# Patient Record
Sex: MALE | Race: WHITE | NOT HISPANIC OR LATINO | Employment: FULL TIME | URBAN - METROPOLITAN AREA
[De-identification: names, ages, dates, MRNs, and addresses within clinical notes are randomized per-mention and may not be internally consistent; named-entity substitution may affect disease eponyms.]

---

## 2017-02-06 PROBLEM — K50.90 CROHN DISEASE: Status: ACTIVE | Noted: 2017-02-06

## 2017-02-07 ENCOUNTER — HOSPITAL ENCOUNTER (INPATIENT)
Facility: HOSPITAL | Age: 42
LOS: 2 days | Discharge: HOME | DRG: 386 | End: 2017-02-09
Attending: COLON & RECTAL SURGERY | Admitting: COLON & RECTAL SURGERY
Payer: COMMERCIAL

## 2017-02-07 DIAGNOSIS — K50.919 CROHN'S DISEASE WITH COMPLICATION, UNSPECIFIED GASTROINTESTINAL TRACT LOCATION: ICD-10-CM

## 2017-02-07 DIAGNOSIS — K50.90 CROHN DISEASE: Primary | ICD-10-CM

## 2017-02-07 LAB
ALBUMIN SERPL BCP-MCNC: 2.6 G/DL
ALP SERPL-CCNC: 67 U/L
ALT SERPL W/O P-5'-P-CCNC: 5 U/L
ANION GAP SERPL CALC-SCNC: 6 MMOL/L
AST SERPL-CCNC: 6 U/L
BASOPHILS # BLD AUTO: 0.02 K/UL
BASOPHILS NFR BLD: 0.2 %
BILIRUB SERPL-MCNC: 0.8 MG/DL
BUN SERPL-MCNC: 8 MG/DL
CALCIUM SERPL-MCNC: 8.3 MG/DL
CHLORIDE SERPL-SCNC: 107 MMOL/L
CO2 SERPL-SCNC: 24 MMOL/L
CREAT SERPL-MCNC: 0.7 MG/DL
CRP SERPL-MCNC: 36.8 MG/L
DIFFERENTIAL METHOD: ABNORMAL
EOSINOPHIL # BLD AUTO: 0.1 K/UL
EOSINOPHIL NFR BLD: 0.8 %
ERYTHROCYTE [DISTWIDTH] IN BLOOD BY AUTOMATED COUNT: 15.5 %
EST. GFR  (AFRICAN AMERICAN): >60 ML/MIN/1.73 M^2
EST. GFR  (NON AFRICAN AMERICAN): >60 ML/MIN/1.73 M^2
GLUCOSE SERPL-MCNC: 82 MG/DL
HCT VFR BLD AUTO: 29.6 %
HGB BLD-MCNC: 9.3 G/DL
INR PPP: 0.9
LYMPHOCYTES # BLD AUTO: 1.4 K/UL
LYMPHOCYTES NFR BLD: 13.8 %
MAGNESIUM SERPL-MCNC: 1.6 MG/DL
MCH RBC QN AUTO: 24.3 PG
MCHC RBC AUTO-ENTMCNC: 31.4 %
MCV RBC AUTO: 77 FL
MONOCYTES # BLD AUTO: 0.9 K/UL
MONOCYTES NFR BLD: 9.1 %
NEUTROPHILS # BLD AUTO: 7.7 K/UL
NEUTROPHILS NFR BLD: 75.9 %
PHOSPHATE SERPL-MCNC: 3.3 MG/DL
PLATELET # BLD AUTO: 320 K/UL
PMV BLD AUTO: 8.7 FL
POTASSIUM SERPL-SCNC: 4.1 MMOL/L
PREALB SERPL-MCNC: 15 MG/DL
PROT SERPL-MCNC: 6.4 G/DL
PROTHROMBIN TIME: 10.1 SEC
RBC # BLD AUTO: 3.83 M/UL
SODIUM SERPL-SCNC: 137 MMOL/L
VANCOMYCIN SERPL-MCNC: <1.1 UG/ML
WBC # BLD AUTO: 10.09 K/UL

## 2017-02-07 PROCEDURE — 20600001 HC STEP DOWN PRIVATE ROOM

## 2017-02-07 PROCEDURE — 84134 ASSAY OF PREALBUMIN: CPT

## 2017-02-07 PROCEDURE — 86140 C-REACTIVE PROTEIN: CPT

## 2017-02-07 PROCEDURE — 25000003 PHARM REV CODE 250: Performed by: SURGERY

## 2017-02-07 PROCEDURE — 99254 IP/OBS CNSLTJ NEW/EST MOD 60: CPT | Mod: ,,, | Performed by: INTERNAL MEDICINE

## 2017-02-07 PROCEDURE — 85025 COMPLETE CBC W/AUTO DIFF WBC: CPT

## 2017-02-07 PROCEDURE — 63600175 PHARM REV CODE 636 W HCPCS: Performed by: SURGERY

## 2017-02-07 PROCEDURE — 36415 COLL VENOUS BLD VENIPUNCTURE: CPT

## 2017-02-07 PROCEDURE — 25000003 PHARM REV CODE 250: Performed by: ORTHOPAEDIC SURGERY

## 2017-02-07 PROCEDURE — 84100 ASSAY OF PHOSPHORUS: CPT

## 2017-02-07 PROCEDURE — 83735 ASSAY OF MAGNESIUM: CPT

## 2017-02-07 PROCEDURE — 80053 COMPREHEN METABOLIC PANEL: CPT

## 2017-02-07 PROCEDURE — 63600175 PHARM REV CODE 636 W HCPCS: Performed by: ORTHOPAEDIC SURGERY

## 2017-02-07 PROCEDURE — 85610 PROTHROMBIN TIME: CPT

## 2017-02-07 PROCEDURE — 80202 ASSAY OF VANCOMYCIN: CPT

## 2017-02-07 RX ORDER — SODIUM CHLORIDE AND POTASSIUM CHLORIDE 150; 900 MG/100ML; MG/100ML
INJECTION, SOLUTION INTRAVENOUS CONTINUOUS
Status: DISCONTINUED | OUTPATIENT
Start: 2017-02-07 | End: 2017-02-07

## 2017-02-07 RX ORDER — ONDANSETRON 8 MG/1
8 TABLET, ORALLY DISINTEGRATING ORAL EVERY 8 HOURS PRN
Status: DISCONTINUED | OUTPATIENT
Start: 2017-02-07 | End: 2017-02-07 | Stop reason: SDUPTHER

## 2017-02-07 RX ORDER — METRONIDAZOLE 500 MG/100ML
500 INJECTION, SOLUTION INTRAVENOUS
Status: DISCONTINUED | OUTPATIENT
Start: 2017-02-07 | End: 2017-02-07

## 2017-02-07 RX ORDER — RAMELTEON 8 MG/1
8 TABLET ORAL NIGHTLY PRN
Status: DISCONTINUED | OUTPATIENT
Start: 2017-02-07 | End: 2017-02-09 | Stop reason: HOSPADM

## 2017-02-07 RX ORDER — SODIUM CHLORIDE 0.9 % (FLUSH) 0.9 %
3 SYRINGE (ML) INJECTION EVERY 8 HOURS
Status: DISCONTINUED | OUTPATIENT
Start: 2017-02-07 | End: 2017-02-09 | Stop reason: HOSPADM

## 2017-02-07 RX ORDER — ACETAMINOPHEN 325 MG/1
650 TABLET ORAL EVERY 8 HOURS PRN
Status: DISCONTINUED | OUTPATIENT
Start: 2017-02-07 | End: 2017-02-09 | Stop reason: HOSPADM

## 2017-02-07 RX ORDER — NALOXONE HCL 0.4 MG/ML
0.02 VIAL (ML) INJECTION
Status: DISCONTINUED | OUTPATIENT
Start: 2017-02-07 | End: 2017-02-09 | Stop reason: HOSPADM

## 2017-02-07 RX ORDER — SODIUM CHLORIDE 9 MG/ML
INJECTION, SOLUTION INTRAVENOUS CONTINUOUS
Status: DISCONTINUED | OUTPATIENT
Start: 2017-02-07 | End: 2017-02-09 | Stop reason: HOSPADM

## 2017-02-07 RX ORDER — MORPHINE SULFATE 4 MG/ML
4 INJECTION, SOLUTION INTRAMUSCULAR; INTRAVENOUS EVERY 4 HOURS PRN
Status: DISCONTINUED | OUTPATIENT
Start: 2017-02-07 | End: 2017-02-09 | Stop reason: HOSPADM

## 2017-02-07 RX ORDER — ONDANSETRON 2 MG/ML
4 INJECTION INTRAMUSCULAR; INTRAVENOUS EVERY 12 HOURS PRN
Status: DISCONTINUED | OUTPATIENT
Start: 2017-02-07 | End: 2017-02-09 | Stop reason: HOSPADM

## 2017-02-07 RX ORDER — METRONIDAZOLE 500 MG/100ML
500 INJECTION, SOLUTION INTRAVENOUS
Status: DISCONTINUED | OUTPATIENT
Start: 2017-02-07 | End: 2017-02-09

## 2017-02-07 RX ORDER — SODIUM CHLORIDE 0.9 % (FLUSH) 0.9 %
3 SYRINGE (ML) INJECTION EVERY 8 HOURS
Status: DISCONTINUED | OUTPATIENT
Start: 2017-02-07 | End: 2017-02-07

## 2017-02-07 RX ORDER — OXYCODONE HYDROCHLORIDE 5 MG/1
5 TABLET ORAL EVERY 4 HOURS PRN
Status: DISCONTINUED | OUTPATIENT
Start: 2017-02-07 | End: 2017-02-09 | Stop reason: HOSPADM

## 2017-02-07 RX ORDER — DIPHENHYDRAMINE HYDROCHLORIDE 50 MG/ML
25 INJECTION INTRAMUSCULAR; INTRAVENOUS EVERY 4 HOURS PRN
Status: DISCONTINUED | OUTPATIENT
Start: 2017-02-07 | End: 2017-02-09 | Stop reason: HOSPADM

## 2017-02-07 RX ORDER — CIPROFLOXACIN 2 MG/ML
400 INJECTION, SOLUTION INTRAVENOUS
Status: DISCONTINUED | OUTPATIENT
Start: 2017-02-07 | End: 2017-02-09

## 2017-02-07 RX ADMIN — OXYCODONE HYDROCHLORIDE 5 MG: 5 TABLET ORAL at 06:02

## 2017-02-07 RX ADMIN — SODIUM CHLORIDE: 0.9 INJECTION, SOLUTION INTRAVENOUS at 05:02

## 2017-02-07 RX ADMIN — Medication 3 ML: at 02:02

## 2017-02-07 RX ADMIN — METRONIDAZOLE 500 MG: 500 INJECTION, SOLUTION INTRAVENOUS at 07:02

## 2017-02-07 RX ADMIN — PIPERACILLIN AND TAZOBACTAM 3.38 G: 3; .375 INJECTION, POWDER, LYOPHILIZED, FOR SOLUTION INTRAVENOUS; PARENTERAL at 05:02

## 2017-02-07 RX ADMIN — OXYCODONE HYDROCHLORIDE 5 MG: 5 TABLET ORAL at 03:02

## 2017-02-07 RX ADMIN — PIPERACILLIN AND TAZOBACTAM 3.38 G: 3; .375 INJECTION, POWDER, LYOPHILIZED, FOR SOLUTION INTRAVENOUS; PARENTERAL at 10:02

## 2017-02-07 RX ADMIN — CIPROFLOXACIN 400 MG: 2 INJECTION, SOLUTION INTRAVENOUS at 06:02

## 2017-02-07 RX ADMIN — METRONIDAZOLE 500 MG: 500 INJECTION, SOLUTION INTRAVENOUS at 05:02

## 2017-02-07 RX ADMIN — OXYCODONE HYDROCHLORIDE 5 MG: 5 TABLET ORAL at 11:02

## 2017-02-07 RX ADMIN — VANCOMYCIN HYDROCHLORIDE 1000 MG: 1 INJECTION, POWDER, LYOPHILIZED, FOR SOLUTION INTRAVENOUS at 05:02

## 2017-02-07 RX ADMIN — Medication 3 ML: at 09:02

## 2017-02-07 RX ADMIN — SODIUM CHLORIDE: 0.9 INJECTION, SOLUTION INTRAVENOUS at 11:02

## 2017-02-07 RX ADMIN — Medication 3 ML: at 05:02

## 2017-02-07 RX ADMIN — OXYCODONE HYDROCHLORIDE 5 MG: 5 TABLET ORAL at 10:02

## 2017-02-07 RX ADMIN — OXYCODONE HYDROCHLORIDE 5 MG: 5 TABLET ORAL at 07:02

## 2017-02-07 NOTE — H&P
[facility]  History & Physical  General Surgery      SUBJECTIVE:     Chief Complaint/Reason for Admission: abdominal pain    History of Present Illness:  Patient is a 41 y.o. male with a h/o Crohn's disease s/p bowel resection. He is a transfer from [facility] where he was admitted after presenting with two days of right-sided abdominal pain. He initially thought that the pain may have been a pulled muscle or a hernia. He did not have any nausea, vomiting, constipation, diarrhea, fevers, or chills. No changes in stool color, volume, or consistency. He has not noticed any blood in his stool. 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 There was also a small abscess within the right anterior pelvic wall. Received zosyn and flagyl at OSH. On arrival he is AFVSS. Pain is well-controlled.     No prescriptions prior to admission.     Review of patient's allergies indicates:  Allergies not on file    No past medical history on file.  No past surgical history on file.  No family history on file.  Social History   Substance Use Topics    Smoking status: Not on file    Smokeless tobacco: Not on file    Alcohol use Not on file      Review of Systems:  Constitutional: no fever or chills  ENT: no nasal congestion or sore throat  Cardiovascular: no chest pain or palpitations  Gastrointestinal: positive for abdominal pain  Genitourinary: no hematuria or dysuria  Musculoskeletal: no arthralgias or myalgias  Neurological: no seizures or tremors  Behavioral/Psych: no auditory or visual hallucinations    OBJECTIVE:     Vital Signs (Most Recent):  Temp: 98.2 °F (36.8 °C) (02/07/17 0243)  Pulse: 81 (02/07/17 0243)  Resp: 16 (02/07/17 0243)  BP: 116/64 (02/07/17 0243)    Physical  Exam:  General: NAD  Neuro: AAOx3  HEENT: normocephalic, atraumatic  Cardio: S1 and S2, RRR  Resp: Moving air appropriately, breathing even and unlabored  Abd: Soft, right-sided TTP, ND; bowel sounds in all quadrants  Ext: Warm and well perfused    Laboratory:  CBC: No results for input(s): WBC, RBC, HGB, HCT, PLT, MCV, MCH, MCHC in the last 168 hours.    CMP: No results for input(s): GLU, CALCIUM, ALBUMIN, PROT, NA, K, CO2, CL, BUN, CREATININE, ALKPHOS, ALT, AST, BILITOT in the last 168 hours.    Coagulation: No results for input(s): INR, APTT in the last 168 hours.    Invalid input(s): PT    ASSESSMENT/PLAN:   Patient is a 41 y.o. male with a h/o Crohn's disease s/p bowel resection with abdominal wall and pelvic wall abscesses on CT.    AFVSS  CBC, CMP, Mag, Phos, PT/INR pending  NPO, MIVF @ 125 cc/hr  Abx: Vanc, Zosyn, Flagyl  Zofran/phenergan for nausea PRN  Pain control: oxycodone PO with morphine IV for breakthrough      Update    Patient seen and examined agree with above.  Currently in no acute distress, pain controlled, wants to eat.  Patient reports intermittent treatment of Crohns flare over the past 20 years.  States one abdominal procedure (ileocolic resection?) in his 20's.  Reports pain began over the weekend and was seen at [facility] and found to have sub q abscesses.  He was started on Abx and transferred to [facility] for further management.  Patient does not see a [facility] GI physician.  Last colnoscopy was many years ago.    PE: afebrile vitals normal, thin male in NAD  ABD: non distended, soft, tender in right flank to deep palpation, no rebound or guarding.      Labs: WBC - 10.09  CRP 36  Pre albumin 15    CT scan: OSH scan shows right flank subcutaneous abscess and thickened TI    Plan:   Admit to CRS  IV Abx to continue  Consult GI for Crohn's assessment and treatment  Plan for IR drainage of abscesses   No acute surgical indication at this time      Jamaal Plasencia MD  Colon and  Rectal Surgery Fellow  [phone]

## 2017-02-07 NOTE — IP AVS SNAPSHOT
[facility]  [address]  [facility] [address]  Phone: [phone]           Patient Discharge Instructions     Our goal is to set you up for success. This packet includes information on your condition, medications, and your home care. It will help you to care for yourself so you don't get sicker and need to go back to the hospital.     Please ask your nurse if you have any questions.        There are many details to remember when preparing to leave the hospital. Here is what you will need to do:    1. Take your medicine. If you are prescribed medications, review your Medication List in the following pages. You may have new medications to  at the pharmacy and others that you'll need to stop taking. Review the instructions for how and when to take your medications. Talk with your doctor or nurses if you are unsure of what to do.     2. Go to your follow-up appointments. Specific follow-up information is listed in the following pages. Your may be contacted by a transition nurse or clinical provider about future appointments. Be sure we have all of the phone numbers to reach you, if needed. Please contact your provider's office if you are unable to make an appointment.     3. Watch for warning signs. Your doctor or nurse will give you detailed warning signs to watch for and when to call for assistance. These instructions may also include educational information about your condition. If you experience any of warning signs to your health, call your doctor.               [facility]  Unless otherwise directed by your provider, please contact [facility], our nurse care line that is available for 24/7 assistance.     [phone] (toll-free)    Registered nurses in the [facility] provide clinical advisement, health education, appointment booking, and other advisory services.                    ** Verify the list of medication(s) below is accurate and up  to date. Carry this with you in case of emergency. If your medications have changed, please notify your healthcare provider.             Medication List      START taking these medications        Additional Info                      ciprofloxacin HCl 500 MG tablet   Commonly known as:  CIPRO   Quantity:  14 tablet   Refills:  0   Dose:  500 mg   Indications:  Intra-abdominal    Instructions:  Take 1 tablet (500 mg total) by mouth every 12 (twelve) hours.     Begin Date    AM    Noon    PM    Bedtime       metronidazole 500 MG tablet   Commonly known as:  FLAGYL   Quantity:  21 tablet   Refills:  0   Dose:  500 mg   Indications:  Intra-abdominal    Instructions:  Take 1 tablet (500 mg total) by mouth every 8 (eight) hours.     Begin Date    AM    Noon    PM    Bedtime       oxycodone 5 MG immediate release tablet   Commonly known as:  ROXICODONE   Quantity:  50 tablet   Refills:  0   Dose:  5 mg    Last time this was given:  5 mg on 2/9/2017  7:19 AM   Instructions:  Take 1 tablet (5 mg total) by mouth every 4 (four) hours as needed.     Begin Date    AM    Noon    PM    Bedtime            Where to Get Your Medications      You can get these medications from any pharmacy     Bring a paper prescription for each of these medications     ciprofloxacin HCl 500 MG tablet    metronidazole 500 MG tablet    oxycodone 5 MG immediate release tablet                  Please bring to all follow up appointments:    1. A copy of your discharge instructions.  2. All medicines you are currently taking in their original bottles.  3. Identification and insurance card.    Please arrive 15 minutes ahead of scheduled appointment time.    Please call 24 hours in advance if you must reschedule your appointment and/or time.        Your Scheduled Appointments     Feb 15, 2017  2:00 PM CST   Ct Abd Pel W Contrast with [facility] CT6 MOBILE LIMIT 450 LBS   [facility]    [address]  [facility]  "[id number] [phone]            Mar 01, 2017  3:45 PM CST   Post OP with OKSANA Upton MD   [facility] ([facility] )    [address]  [facility] [address] [phone]              Follow-up Information     Follow up with JONNA Upton MD In 1 week.    Specialty:  Colon and Rectal Surgery    Why:  for drain removal, to have CT scan done prior to appt    Contact information:    [address]  [facility] [id number] [phone]          Discharge Instructions     Future Orders    Call MD for:  difficulty breathing or increased cough     Call MD for:  persistent nausea and vomiting or diarrhea     Call MD for:  redness, tenderness, or signs of infection (pain, swelling, redness, odor or green/yellow discharge around incision site)     Call MD for:  severe uncontrolled pain     Call MD for:  temperature >100.4     Call MD for:     Scheduling Instructions:    Record output from drain daily, keep that information and bring to appt in one week with Dr. Upton  Follow up with selected GI doctor for management of Crohn's    Diet general     Questions:    Total calories:      Fat restriction, if any:      Protein restriction, if any:      Na restriction, if any:      Fluid restriction:      Additional restrictions:          Primary Diagnosis     Your primary diagnosis was:  Inflammatory Bowel Disease (Crohn's Disease)      Admission Information     Date & Time Provider Department CSN    2/7/2017  2:40 AM OKSANA Upton MD [facility] [id number]      Care Providers     Provider Role Specialty Primary office phone    OKSANA Upton MD Attending Provider Colon and Rectal Surgery [phone]      Your Vitals Were     BP Pulse Temp Resp Height Weight    115/69 (BP Location: Right arm, Patient Position: Lying, BP Method: Automatic) 79 97.9 °F (36.6 °C) (Oral) 16 6' 3" (1.905 m) 61.2 kg (135 lb)    SpO2 BMI             99% 16.87 kg/m2         Recent Lab Values  "    No lab values to display.      Pending Labs     Order Current Status    Aerobic culture Preliminary result    Culture, Anaerobe Preliminary result      Allergies as of 2/9/2017     No Known Allergies      Advance Directives     An advance directive is a document which, in the event you are no longer able to make decisions for yourself, tells your healthcare team what kind of treatment you do or do not want to receive, or who you would like to make those decisions for you.  If you do not currently have an advance directive, [facility] encourages you to create one.  For more information call:  [phone]), [phone] ([phone]),  or log on to [email or web address].        Smoking Cessation     If you would like to quit smoking:   You may be eligible for free services if you are a [place] resident and started smoking cigarettes before September 1, 1988.  Call the Smoking Cessation Trust ([facility]) toll free at ([phone] or [phone]) [phone].   Call 0-209-QUIT-NOW if you do not meet the above criteria.            Language Assistance Services     ATTENTION: Language assistance services are available, free of charge. Please call [phone].      ATENCIÓN: Si habla español, tiene a regan disposición servicios gratuitos de asistencia lingüística. Llame al [phone].     CHÚ Ý: N?u b?n nói Ti?ng Vi?t, có các d?ch v? h? tr? ngôn ng? mi?n phí dành cho b?n. G?i s? [phone].        [facility] Sign-Up     Activating your [facility] account is as easy as 1-2-3!     1) Visit [email or web address], select Sign Up Now, enter this activation code and your date of birth, then select Next.  [email or web address]  Expires: 3/26/2017  7:53 AM      2) Create a username and password to use when you visit [facility] in the future and select a security question in case you lose your password and select Next.    3) Enter your e-mail address and click Sign Up!    Additional Information  If you have questions, please  e-mail [email or web address] or call [phone] to talk to our [facility] staff. Remember, [facility] is NOT to be used for urgent needs. For medical emergencies, dial 911.          [facility] complies with applicable Federal civil rights laws and does not discriminate on the basis of race, color, national origin, age, disability, or sex.

## 2017-02-07 NOTE — CONSULTS
IR Consult Note:    Reviewed chart. No imaging available to us at this time.    Recommendations:  1) Bring CD with imaging from outside hospital to IR department.  2) Decisions will be made based on imaging review.  3) Keep patient NPO.    Jean-Claude Garcia MD  Resident  Department of Radiology  Pager: [phone]

## 2017-02-07 NOTE — PLAN OF CARE
met with pt and obtained assessment information.  Pt receiving IV Antibiotics.  States relief of pain after taking pain medication.  Pt travels due to work, his wife resides in [place].  CM will continue to follow for discharge planning.     No PCP    [facility]armacy [facility] [place] [place]    Payor - Payor: [facility] / Plan: BCBS ALL OUT OF STATE / Product Type: PPO /           02/07/17 0755   Discharge Assessment   Assessment Type Discharge Planning Assessment   Assessment information obtained from? Patient;Medical Record   Prior to hospitilization cognitive status: Alert/Oriented   Prior to hospitalization functional status: Independent   Current cognitive status: Alert/Oriented   Current Functional Status: Independent   Arrived From home or self-care   Lives With spouse  (pt's wife resides in [place], he travels because of work and stays in [place] often. )   Able to Return to Prior Arrangements yes   Is patient able to care for self after discharge? Yes   How many people do you have in your home that can help with your care after discharge? 0   Who are your caregiver(s) and their phone number(s)? Wife, Ella Nelson, [phone]   Patient's perception of discharge disposition home or selfcare   Readmission Within The Last 30 Days no previous admission in last 30 days   Patient currently being followed by outpatient case management? No   Patient currently receives home health services? No   Does the patient currently use HME? No   Equipment Currently Used at Home none   Do you have any problems affording any of your prescribed medications? No   Is the patient taking medications as prescribed? yes   Do you have any financial concerns preventing you from receiving the healthcare you need? No   Does the patient have transportation to healthcare appointments? Yes   Transportation Available car   On Dialysis? No   Does the patient receive services at the Coumadin Clinic? No   Discharge  Plan A Home   Discharge Plan B Home Health   Patient/Family In Agreement With Plan yes

## 2017-02-07 NOTE — PROGRESS NOTES
Patient arrived to room 610 via stretcher from [facility]. Vital signs are stable. Awake, alert and oriented x 4. Client is up in bed with call light in reach. Awaiting orders. Will continue to monitor.

## 2017-02-07 NOTE — CONSULTS
"Gastroenterology  Consult note      SUBJECTIVE:     Reason for Consult: Hx of Crohn's    History of Present Illness:  Patient is a 41 y.o. male with a history of Crohn's disease who presented to [facility] as a transfer from [facility] for further management of his IBD.    The patient initially presented to the OSH with a complaint of right sided abdominal pain.  He denied fevers, chills, rigors.  No hematochezia.  No diarrhea.  No nausea/vomiting.      A CT at the OSH showed thickening of the distal ileum and cecum, a fistula from the right colon to the abdominal wall, and 3 loculated fluid collections: right abdominal wall, right pelvic wall, right buttock.     The patient states he was diagnosed with Crohn's disease ~20 years ago when he presented with abdominal pain.  He underwent a colon resection at that time (no records available).  He states he was put on a "Sulfa drug" at the time and stayed on this medication for about 10 years.  Over the last 10 years, he states he has not been on any medication.  He states when he gets abdominal pain he will occasionally go to a walk-in clinic to get steroids.        Prior Endoscopic History  Colonoscopy ~10 years ago, WNL per the patient  EGD: never        No prescriptions prior to admission.       Review of patient's allergies indicates:  No Known Allergies     History reviewed. No pertinent past medical history.  History reviewed. No pertinent past surgical history.  History reviewed. No pertinent family history.  Social History   Substance Use Topics    Smoking status: Current Every Day Smoker     Packs/day: 0.25     Years: 10.00     Types: Cigarettes    Smokeless tobacco: None    Alcohol use No       Review of Systems:  Constitutional: no fever, chills or change in weight   Eyes: no visual changes   ENT: no sore throat or dysphagia  Respiratory: no cough or shortness of breath   Cardiovascular: no chest pain or palpitations   Gastrointestinal: as per " HPI  Hematologic/Lymphatic: no easy bruising or lymphadenopathy   Musculoskeletal: no arthralgias or myalgias   Neurological: no seizures, tremors or change in mental status  Behavioral/Psych: no auditory or visual hallucinations    OBJECTIVE:     Vital Signs (Most Recent)  Temp: 98.5 °F (36.9 °C) (02/07/17 1159)  Pulse: 84 (02/07/17 1159)  Resp: 14 (02/07/17 1159)  BP: (!) 99/55 (02/07/17 1159)  SpO2: 99 % (02/07/17 1159)    Physical Exam:  General appearance: thin, no acute distress  Eyes: anicteric sclerae  Throat: lips, mucosa, and tongue normal, Mallampati II  Neck: supple, symmetrical, trachea midline, no cervical lymphadenopathy  Lungs: breath sounds vesicular in nature, air entry equal bilaterally, no wheeze nor crepitations  Heart: regular rate and rhythm, S1, S2 clearly audible, no murmur, click, rub or gallop  Abdomen: soft, non-tender, non-distended; BS present and normal; no masses,  no organomegaly, no rebound tenderness, rigidity, or guarding  Extremities: no cyanosis or edema, no clubbing  Pulses: 2+ and symmetric  Skin: Skin color, texture, turgor normal.   Neurologic: No focal deficits noted      Laboratory    CBC:   Recent Labs  Lab 02/07/17 0436   WBC 10.09   RBC 3.83*   HGB 9.3*   HCT 29.6*      MCV 77*   MCH 24.3*   MCHC 31.4*     BMP:   Recent Labs  Lab 02/07/17 0436   GLU 82      K 4.1      CO2 24   BUN 8   CREATININE 0.7   CALCIUM 8.3*   MG 1.6     Coagulation:   Recent Labs  Lab 02/07/17 0436   INR 0.9       Imaging:  Reviewed      ASSESSMENT/PLAN:     Crohn's disease with CT evidence of fistulizing disease and abscesses  The patient would be best served now by an IR evaluation for consideration of percutaneous drainage of the fluid collections/abscesses.  He will need longterm antibiotic coverage.  The patient is from [place] and will need to establish care with both a local PCP and a Gastroenterologist.  The patient will ultimately need a TNF-inhibitor (Remicade vs  Humira likely).  In the meantime, we will obtain some preliminary labs.      Recommendations:   IR Consult for drainage of fluid collections  Ciprofloxacin+Flagyl (patient will need Flagyl x 3 months, or until local [place] GI follow up can be arranged)  Check TB Quant Gold, Hepatitis Panel, TPMT levels      Maxime Shelby   Gastroenterology Fellow PGY V  [phone]

## 2017-02-08 LAB
ANION GAP SERPL CALC-SCNC: 5 MMOL/L
BASOPHILS # BLD AUTO: 0.02 K/UL
BASOPHILS NFR BLD: 0.3 %
BUN SERPL-MCNC: 7 MG/DL
CALCIUM SERPL-MCNC: 8.2 MG/DL
CHLORIDE SERPL-SCNC: 108 MMOL/L
CO2 SERPL-SCNC: 26 MMOL/L
CREAT SERPL-MCNC: 0.7 MG/DL
CRP SERPL-MCNC: 44.4 MG/L
DIFFERENTIAL METHOD: ABNORMAL
EOSINOPHIL # BLD AUTO: 0.1 K/UL
EOSINOPHIL NFR BLD: 1.3 %
ERYTHROCYTE [DISTWIDTH] IN BLOOD BY AUTOMATED COUNT: 15.5 %
EST. GFR  (AFRICAN AMERICAN): >60 ML/MIN/1.73 M^2
EST. GFR  (NON AFRICAN AMERICAN): >60 ML/MIN/1.73 M^2
GLUCOSE SERPL-MCNC: 89 MG/DL
GRAM STN SPEC: NORMAL
GRAM STN SPEC: NORMAL
HCT VFR BLD AUTO: 29.2 %
HGB BLD-MCNC: 9.1 G/DL
LYMPHOCYTES # BLD AUTO: 1.5 K/UL
LYMPHOCYTES NFR BLD: 18.2 %
MCH RBC QN AUTO: 24.4 PG
MCHC RBC AUTO-ENTMCNC: 31.2 %
MCV RBC AUTO: 78 FL
MONOCYTES # BLD AUTO: 1 K/UL
MONOCYTES NFR BLD: 12.6 %
NEUTROPHILS # BLD AUTO: 5.4 K/UL
NEUTROPHILS NFR BLD: 67.2 %
PLATELET # BLD AUTO: 325 K/UL
PMV BLD AUTO: 9 FL
POTASSIUM SERPL-SCNC: 4.5 MMOL/L
RBC # BLD AUTO: 3.73 M/UL
SODIUM SERPL-SCNC: 139 MMOL/L
WBC # BLD AUTO: 7.96 K/UL

## 2017-02-08 PROCEDURE — 87205 SMEAR GRAM STAIN: CPT

## 2017-02-08 PROCEDURE — 85025 COMPLETE CBC W/AUTO DIFF WBC: CPT

## 2017-02-08 PROCEDURE — 87186 SC STD MICRODIL/AGAR DIL: CPT | Mod: 59

## 2017-02-08 PROCEDURE — 36415 COLL VENOUS BLD VENIPUNCTURE: CPT

## 2017-02-08 PROCEDURE — 25000003 PHARM REV CODE 250: Performed by: ORTHOPAEDIC SURGERY

## 2017-02-08 PROCEDURE — 87070 CULTURE OTHR SPECIMN AEROBIC: CPT

## 2017-02-08 PROCEDURE — 87075 CULTR BACTERIA EXCEPT BLOOD: CPT

## 2017-02-08 PROCEDURE — 86140 C-REACTIVE PROTEIN: CPT

## 2017-02-08 PROCEDURE — 20600001 HC STEP DOWN PRIVATE ROOM

## 2017-02-08 PROCEDURE — 80048 BASIC METABOLIC PNL TOTAL CA: CPT

## 2017-02-08 PROCEDURE — 63600175 PHARM REV CODE 636 W HCPCS: Performed by: RADIOLOGY

## 2017-02-08 PROCEDURE — 25000003 PHARM REV CODE 250: Performed by: SURGERY

## 2017-02-08 PROCEDURE — 87077 CULTURE AEROBIC IDENTIFY: CPT

## 2017-02-08 PROCEDURE — 25000003 PHARM REV CODE 250: Performed by: NURSE PRACTITIONER

## 2017-02-08 PROCEDURE — 99232 SBSQ HOSP IP/OBS MODERATE 35: CPT | Mod: ,,, | Performed by: COLON & RECTAL SURGERY

## 2017-02-08 PROCEDURE — 63600175 PHARM REV CODE 636 W HCPCS: Performed by: SURGERY

## 2017-02-08 PROCEDURE — 0J9930Z DRAINAGE OF BUTTOCK SUBCUTANEOUS TISSUE AND FASCIA WITH DRAINAGE DEVICE, PERCUTANEOUS APPROACH: ICD-10-PCS | Performed by: RADIOLOGY

## 2017-02-08 PROCEDURE — 63600175 PHARM REV CODE 636 W HCPCS: Performed by: ORTHOPAEDIC SURGERY

## 2017-02-08 RX ORDER — MIDAZOLAM HYDROCHLORIDE 1 MG/ML
INJECTION, SOLUTION INTRAMUSCULAR; INTRAVENOUS CODE/TRAUMA/SEDATION MEDICATION
Status: COMPLETED | OUTPATIENT
Start: 2017-02-08 | End: 2017-02-08

## 2017-02-08 RX ORDER — NICOTINE 7MG/24HR
1 PATCH, TRANSDERMAL 24 HOURS TRANSDERMAL DAILY
Status: DISCONTINUED | OUTPATIENT
Start: 2017-02-08 | End: 2017-02-09 | Stop reason: HOSPADM

## 2017-02-08 RX ORDER — FENTANYL CITRATE 50 UG/ML
INJECTION, SOLUTION INTRAMUSCULAR; INTRAVENOUS CODE/TRAUMA/SEDATION MEDICATION
Status: COMPLETED | OUTPATIENT
Start: 2017-02-08 | End: 2017-02-08

## 2017-02-08 RX ADMIN — OXYCODONE HYDROCHLORIDE 5 MG: 5 TABLET ORAL at 09:02

## 2017-02-08 RX ADMIN — Medication 3 ML: at 10:02

## 2017-02-08 RX ADMIN — FENTANYL CITRATE 25 MCG: 50 INJECTION, SOLUTION INTRAMUSCULAR; INTRAVENOUS at 09:02

## 2017-02-08 RX ADMIN — MIDAZOLAM HYDROCHLORIDE 0.5 MG: 1 INJECTION, SOLUTION INTRAMUSCULAR; INTRAVENOUS at 09:02

## 2017-02-08 RX ADMIN — METRONIDAZOLE 500 MG: 500 INJECTION, SOLUTION INTRAVENOUS at 09:02

## 2017-02-08 RX ADMIN — MIDAZOLAM HYDROCHLORIDE 0.5 MG: 1 INJECTION, SOLUTION INTRAMUSCULAR; INTRAVENOUS at 10:02

## 2017-02-08 RX ADMIN — Medication 3 ML: at 01:02

## 2017-02-08 RX ADMIN — CIPROFLOXACIN 400 MG: 2 INJECTION, SOLUTION INTRAVENOUS at 06:02

## 2017-02-08 RX ADMIN — MORPHINE SULFATE 4 MG: 4 INJECTION INTRAVENOUS at 03:02

## 2017-02-08 RX ADMIN — OXYCODONE HYDROCHLORIDE 5 MG: 5 TABLET ORAL at 03:02

## 2017-02-08 RX ADMIN — OXYCODONE HYDROCHLORIDE 5 MG: 5 TABLET ORAL at 05:02

## 2017-02-08 RX ADMIN — MORPHINE SULFATE 4 MG: 4 INJECTION INTRAVENOUS at 11:02

## 2017-02-08 RX ADMIN — FENTANYL CITRATE 25 MCG: 50 INJECTION, SOLUTION INTRAMUSCULAR; INTRAVENOUS at 10:02

## 2017-02-08 RX ADMIN — METRONIDAZOLE 500 MG: 500 INJECTION, SOLUTION INTRAVENOUS at 03:02

## 2017-02-08 RX ADMIN — CIPROFLOXACIN 400 MG: 2 INJECTION, SOLUTION INTRAVENOUS at 05:02

## 2017-02-08 RX ADMIN — NICOTINE 1 PATCH: 7 PATCH, EXTENDED RELEASE TRANSDERMAL at 03:02

## 2017-02-08 RX ADMIN — OXYCODONE HYDROCHLORIDE 5 MG: 5 TABLET ORAL at 01:02

## 2017-02-08 RX ADMIN — MORPHINE SULFATE 4 MG: 4 INJECTION INTRAVENOUS at 08:02

## 2017-02-08 RX ADMIN — METRONIDAZOLE 500 MG: 500 INJECTION, SOLUTION INTRAVENOUS at 12:02

## 2017-02-08 RX ADMIN — SODIUM CHLORIDE: 0.9 INJECTION, SOLUTION INTRAVENOUS at 04:02

## 2017-02-08 NOTE — PROGRESS NOTES
Pt arrived to ROCU bay 2, no acute distress noted. Report received from CHRIS Presley. Will continue to monitor.

## 2017-02-08 NOTE — H&P
Inpatient Radiology Pre-procedure Note    History of Present Illness:  Pedro Nelson is a 41 y.o. male who presents for CT guided right abdominal wall fluid collection drain placement and aspiration of the right anterior pelvic wall fluid.    Admission H&P reviewed.  History reviewed. No pertinent past medical history.  History reviewed. No pertinent past surgical history.    Review of Systems:   As documented in primary team H&P    Home Meds:   Prior to Admission medications    Not on File     Scheduled Meds:    ciprofloxacin (CIPRO)400mg/200ml D5W IVPB  400 mg Intravenous Q12H    metronidazole  500 mg Intravenous Q8H    sodium chloride 0.9%  3 mL Intravenous Q8H     Continuous Infusions:    sodium chloride 0.9% 125 mL/hr at 02/07/17 0095     PRN Meds:acetaminophen, diphenhydrAMINE, morphine, naloxone, ondansetron, oxycodone, promethazine (PHENERGAN) IVPB, ramelteon  Anticoagulants/Antiplatelets: no anticoagulation    Allergies: Review of patient's allergies indicates:  No Known Allergies  Sedation Hx: have not been any systemic reactions       Labs:    Recent Labs  Lab 02/07/17 0436   INR 0.9       Recent Labs  Lab 02/08/17 0524   WBC 7.96   HGB 9.1*   HCT 29.2*   MCV 78*         Recent Labs  Lab 02/07/17 0436 02/08/17 0524   GLU 82 89    139   K 4.1 4.5    108   CO2 24 26   BUN 8 7   CREATININE 0.7 0.7   CALCIUM 8.3* 8.2*   MG 1.6  --    ALT 5*  --    AST 6*  --    ALBUMIN 2.6*  --    BILITOT 0.8  --          Vitals:  Temp: 98.4 °F (36.9 °C) (02/08/17 0753)  Pulse: 90 (02/08/17 0753)  Resp: 16 (02/08/17 0753)  BP: 111/67 (02/08/17 0753)  SpO2: 97 % (02/08/17 0753)     Physical Exam:  ASA: 3  Mallampati: 3    General: no acute distress  Mental Status: alert and oriented to person, place and time  HEENT: normocephalic, atraumatic  Chest: unlabored breathing  Heart: regular heart rate  Abdomen: nondistended  Extremity: moves all extremities    Plan:   CT guided right abdominal wall fluid  collection drain placement and aspiration of the right anterior pelvic wall fluid.  Sedation Plan: Moderate.        Alexandre Rubio  PGY-3  Department of Radiology  [phone]

## 2017-02-08 NOTE — PLAN OF CARE
Problem: Patient Care Overview  Goal: Plan of Care Review  Outcome: Ongoing (interventions implemented as appropriate)  Plan of care reviewed with patient, verbalized understanding. Assumed care of patient a 0230 no changes noted. VSS. AAOx4. Patient complained of pain moderately relieved w/ PRN pain meds. Npo after midnight. Side rails up x 3, bed in low position. Call bell within easy reach. Instructed to call for any needs or to request assistance before attempting to get up. Verbalized understanding. No acute events overnight. Will continue to monitor closely. . Maryanne Rob RN

## 2017-02-08 NOTE — PLAN OF CARE
CM received request from Dr Plasencia, pt needs GI follow-up.  CM met with pt and discussed need to have GI follow-up outpatient, pt stated preference to see a GI physician in [place], [place].  CM did search on [employer] website for Gastroenterology specialist within 10 miles of [place], four providers available.  CM printed list and gave it to patient to choose from and set up appointment.

## 2017-02-08 NOTE — PROGRESS NOTES
Pt arrived to  for abscess drain. Pt verified using two identifiers.  Allergies verified with patient.  Will continue to monitor.

## 2017-02-08 NOTE — PROGRESS NOTES
CT guided drain placement to right buttock complete.  No specimen obtained to right anterior pelvic area. Pt tolerated well. VSS. No signs or symptoms of distress noted.  Right anterior pelvic area site CDI.  Bulb suction drain placement  to right gluteal area CDI.  Pt will be transferred to ROCU bed 2.

## 2017-02-08 NOTE — PLAN OF CARE
Problem: Patient Care Overview  Goal: Plan of Care Review  Outcome: Ongoing (interventions implemented as appropriate)  POC reviewed with pt, pt verbalizes understanding. AAOx4. VSS. R Anterior pelvic incision with bandaid C/D/I. R gluteal IR bulb drain with scant drainage in tubing, none in bulb (bulb to bulb suction). Pt complaining of pain to R gluteal area.  pt tolerating regular diet, no N/V. IV fluids infusing. + bowel movements. Adequate UOP, using urinal. Pt has walked around in halls post procedure. No falls or injuries at this time. Pain controlled with prn pain meds. Side rails up x2, SCDs on, teds on. Nicotine patch applied per pt request. WCTM.

## 2017-02-08 NOTE — NURSING TRANSFER
Nursing Transfer Note      2/8/2017     Transfer To: 610[address]    Transfer via stretcher    Transfer with iv pole    Transported by transport team    Chart send with patient: Yes

## 2017-02-08 NOTE — PROCEDURES
Radiology Post-Procedure Note    Pre Op Diagnosis: right gluteal abscess and right anterior pelvic wall fluid and gas collection.    Post Op Diagnosis: right gluteal abscess and right anterior pelvic wall fluid and gas collection.    Procedure:right gluteal abscess drain placement and right anterior pelvic wall fluid and gas collection aspiration.    Procedure performed by: staff Dr. Keita and  Resident Dr. Rubio.    Written Informed Consent Obtained: Yes    Specimen Removed: YES     Estimated Blood Loss: Minimal    Findings: CT was used for localization of abnormal fluid collection. A needle was inserted into the fluid collection and purulent fluid was aspirated.  A wire was inserted into the collection and the tract was dilated.  A 8.0 Hebrew all-purpose drainage catheter was inserted and a pigtail loop of the distal end was formed.  The catheter was sutured into place and approximately  3 mL fluid was removed.     Additionally, CT aspiration of the anterior pelvic wall fluid/air was attempted, however; no fluid could be aspirated.     A specimen was sent to the lab for further analysis and culture.    The patient tolerated procedure well and there were no complications. Please see procedure report under Imaging for further details.    Alexandre Rubio  PGY-3  Department of Radiology  [phone]

## 2017-02-08 NOTE — SEDATION DOCUMENTATION
First abscess drain access complete to anterior pelvic region, no specimen obtained, pt repositioned to prone position and sterilized for second abscess drain procedure.

## 2017-02-09 ENCOUNTER — TELEPHONE (OUTPATIENT)
Dept: SURGERY | Facility: CLINIC | Age: 42
End: 2017-02-09

## 2017-02-09 VITALS
DIASTOLIC BLOOD PRESSURE: 69 MMHG | OXYGEN SATURATION: 99 % | BODY MASS INDEX: 16.78 KG/M2 | RESPIRATION RATE: 16 BRPM | SYSTOLIC BLOOD PRESSURE: 115 MMHG | HEIGHT: 75 IN | HEART RATE: 79 BPM | WEIGHT: 135 LBS | TEMPERATURE: 98 F

## 2017-02-09 DIAGNOSIS — K50.914 CROHN'S DISEASE WITH ABSCESS, UNSPECIFIED GASTROINTESTINAL TRACT LOCATION: Primary | ICD-10-CM

## 2017-02-09 LAB
ANION GAP SERPL CALC-SCNC: 6 MMOL/L
BASOPHILS # BLD AUTO: 0.02 K/UL
BASOPHILS NFR BLD: 0.3 %
BUN SERPL-MCNC: 6 MG/DL
CALCIUM SERPL-MCNC: 8.2 MG/DL
CHLORIDE SERPL-SCNC: 104 MMOL/L
CO2 SERPL-SCNC: 26 MMOL/L
CREAT SERPL-MCNC: 0.8 MG/DL
DIFFERENTIAL METHOD: ABNORMAL
EOSINOPHIL # BLD AUTO: 0.2 K/UL
EOSINOPHIL NFR BLD: 2.5 %
ERYTHROCYTE [DISTWIDTH] IN BLOOD BY AUTOMATED COUNT: 15.4 %
EST. GFR  (AFRICAN AMERICAN): >60 ML/MIN/1.73 M^2
EST. GFR  (NON AFRICAN AMERICAN): >60 ML/MIN/1.73 M^2
GLUCOSE SERPL-MCNC: 109 MG/DL
HCT VFR BLD AUTO: 30.4 %
HGB BLD-MCNC: 9.4 G/DL
LYMPHOCYTES # BLD AUTO: 2.1 K/UL
LYMPHOCYTES NFR BLD: 26.5 %
MCH RBC QN AUTO: 24.2 PG
MCHC RBC AUTO-ENTMCNC: 30.9 %
MCV RBC AUTO: 78 FL
MONOCYTES # BLD AUTO: 1.1 K/UL
MONOCYTES NFR BLD: 14.4 %
NEUTROPHILS # BLD AUTO: 4.5 K/UL
NEUTROPHILS NFR BLD: 56 %
PLATELET # BLD AUTO: 310 K/UL
PMV BLD AUTO: 8.9 FL
POTASSIUM SERPL-SCNC: 3.9 MMOL/L
RBC # BLD AUTO: 3.89 M/UL
SODIUM SERPL-SCNC: 136 MMOL/L
WBC # BLD AUTO: 7.93 K/UL

## 2017-02-09 PROCEDURE — 99238 HOSP IP/OBS DSCHRG MGMT 30/<: CPT | Mod: ,,, | Performed by: NURSE PRACTITIONER

## 2017-02-09 PROCEDURE — 80048 BASIC METABOLIC PNL TOTAL CA: CPT

## 2017-02-09 PROCEDURE — 25000003 PHARM REV CODE 250: Performed by: ORTHOPAEDIC SURGERY

## 2017-02-09 PROCEDURE — 25000003 PHARM REV CODE 250: Performed by: SURGERY

## 2017-02-09 PROCEDURE — 63600175 PHARM REV CODE 636 W HCPCS: Performed by: SURGERY

## 2017-02-09 PROCEDURE — 85025 COMPLETE CBC W/AUTO DIFF WBC: CPT

## 2017-02-09 PROCEDURE — 36415 COLL VENOUS BLD VENIPUNCTURE: CPT

## 2017-02-09 PROCEDURE — 63600175 PHARM REV CODE 636 W HCPCS: Performed by: ORTHOPAEDIC SURGERY

## 2017-02-09 RX ORDER — CIPROFLOXACIN 500 MG/1
500 TABLET ORAL EVERY 12 HOURS
Status: DISCONTINUED | OUTPATIENT
Start: 2017-02-09 | End: 2017-02-09 | Stop reason: HOSPADM

## 2017-02-09 RX ORDER — CIPROFLOXACIN 500 MG/1
500 TABLET ORAL EVERY 12 HOURS
Qty: 14 TABLET | Refills: 0 | Status: SHIPPED | OUTPATIENT
Start: 2017-02-09 | End: 2017-02-16

## 2017-02-09 RX ORDER — METRONIDAZOLE 500 MG/1
500 TABLET ORAL EVERY 8 HOURS
Qty: 21 TABLET | Refills: 0 | Status: SHIPPED | OUTPATIENT
Start: 2017-02-09 | End: 2017-02-15 | Stop reason: ALTCHOICE

## 2017-02-09 RX ORDER — METRONIDAZOLE 500 MG/1
500 TABLET ORAL EVERY 8 HOURS
Status: DISCONTINUED | OUTPATIENT
Start: 2017-02-09 | End: 2017-02-09 | Stop reason: HOSPADM

## 2017-02-09 RX ORDER — OXYCODONE HYDROCHLORIDE 5 MG/1
5 TABLET ORAL EVERY 4 HOURS PRN
Qty: 50 TABLET | Refills: 0 | Status: SHIPPED | OUTPATIENT
Start: 2017-02-09 | End: 2017-02-15 | Stop reason: SDUPTHER

## 2017-02-09 RX ADMIN — METRONIDAZOLE 500 MG: 500 INJECTION, SOLUTION INTRAVENOUS at 05:02

## 2017-02-09 RX ADMIN — OXYCODONE HYDROCHLORIDE 5 MG: 5 TABLET ORAL at 11:02

## 2017-02-09 RX ADMIN — MORPHINE SULFATE 4 MG: 4 INJECTION INTRAVENOUS at 01:02

## 2017-02-09 RX ADMIN — MORPHINE SULFATE 4 MG: 4 INJECTION INTRAVENOUS at 05:02

## 2017-02-09 RX ADMIN — CIPROFLOXACIN 400 MG: 2 INJECTION, SOLUTION INTRAVENOUS at 06:02

## 2017-02-09 RX ADMIN — Medication 3 ML: at 05:02

## 2017-02-09 RX ADMIN — OXYCODONE HYDROCHLORIDE 5 MG: 5 TABLET ORAL at 07:02

## 2017-02-09 RX ADMIN — MORPHINE SULFATE 4 MG: 4 INJECTION INTRAVENOUS at 09:02

## 2017-02-09 NOTE — PLAN OF CARE
Future Appointments  Date Time Provider Department Center   2/15/2017 2:00 PM [facility] CT6 MOBILE LIMIT 450 LBS [facility] CT SCAN [facility]   2/15/2017 3:30 PM OKSANA Upton MD [facility] COLON [facility]   3/1/2017 3:45 PM OKSANA Upton MD [facility] COLON [place]       Pt given information in writing yesterday for GI providers, to set up outpatient follow-up, in his preferred area- [place].         02/09/17 1100   Final Note   Assessment Type Final Discharge Note   Discharge Disposition Home   Discharge planning education complete? Yes   Hospital Follow Up  Appt(s) scheduled? Yes   Discharge plans and expectations educations in teach back method with documentation complete? Yes   Offered [facility] -- Bedside Delivery? n/a   Discharge/Hospital Encounter Summary to ([facility]) PCP n/a   Referral to Outpatient Case Management complete? n/a   Referral to / orders for Home Health Complete? n/a   30 day supply of medicines given at discharge, if documented non-compliance / non-adherence? n/a   Any social issues identified prior to discharge? No   Did you assess the readiness or willingness of the family or caregiver to support self management of care? Yes

## 2017-02-09 NOTE — NURSING
Discharge instructions given to pt, pt verbalized understanding. Paper prescriptions given to pt along with return to work note. Follow up appointment discussed. IR drain remains in place, pt knows to document output daily. Follow up appointment discussed. Pt leaving via wheelchair with transport.

## 2017-02-09 NOTE — PROGRESS NOTES
COLON RECTAL SURGERY  PROGRESS NOTE    LENGTH OF STAY: 1  POST OPERATIVE DAY:   for * No surgery found *     Subjective     Daily HPI: No acute evetns overnight, NPO at midnight for drainage procedure today.  ABX infusing, pain minimal and controlled.     Objective     Vitals:    02/08/17 1601   BP: (!) 110/58   Pulse: 72   Resp: 16   Temp: 97.9 °F (36.6 °C)         Intake/Output Summary (Last 24 hours) at 02/08/17 1951  Last data filed at 02/08/17 1800   Gross per 24 hour   Intake          4032.71 ml   Output             2000 ml   Net          2032.71 ml       General:  male in nad  Neuro: AAO x4 MAEx4 PERRL  Chest: resps even unlabored, cap refill <2 sec  Abd: soft, nondistended, tenderness mild in the RLQ, in the right flank and right buttock, without guarding and without rebound  Wound (if present): n/a  Stoma (if present): none    Recent Results (from the past 24 hour(s))   CBC with Auto Differential    Collection Time: 02/08/17  5:24 AM   Result Value Ref Range    WBC 7.96 3.90 - 12.70 K/uL    RBC 3.73 (L) 4.60 - 6.20 M/uL    Hemoglobin 9.1 (L) 14.0 - 18.0 g/dL    Hematocrit 29.2 (L) 40.0 - 54.0 %    MCV 78 (L) 82 - 98 fL    MCH 24.4 (L) 27.0 - 31.0 pg    MCHC 31.2 (L) 32.0 - 36.0 %    RDW 15.5 (H) 11.5 - 14.5 %    Platelets 325 150 - 350 K/uL    MPV 9.0 (L) 9.2 - 12.9 fL    Gran # 5.4 1.8 - 7.7 K/uL    Lymph # 1.5 1.0 - 4.8 K/uL    Mono # 1.0 0.3 - 1.0 K/uL    Eos # 0.1 0.0 - 0.5 K/uL    Baso # 0.02 0.00 - 0.20 K/uL    Gran% 67.2 38.0 - 73.0 %    Lymph% 18.2 18.0 - 48.0 %    Mono% 12.6 4.0 - 15.0 %    Eosinophil% 1.3 0.0 - 8.0 %    Basophil% 0.3 0.0 - 1.9 %    Differential Method Automated    Basic Metabolic Panel    Collection Time: 02/08/17  5:24 AM   Result Value Ref Range    Sodium 139 136 - 145 mmol/L    Potassium 4.5 3.5 - 5.1 mmol/L    Chloride 108 95 - 110 mmol/L    CO2 26 23 - 29 mmol/L    Glucose 89 70 - 110 mg/dL    BUN, Bld 7 6 - 20 mg/dL    Creatinine 0.7 0.5 - 1.4 mg/dL    Calcium 8.2  (L) 8.7 - 10.5 mg/dL    Anion Gap 5 (L) 8 - 16 mmol/L    eGFR if African American >60.0 >60 mL/min/1.73 m^2    eGFR if non African American >60.0 >60 mL/min/1.73 m^2   C-reactive protein    Collection Time: 02/08/17  5:24 AM   Result Value Ref Range    CRP 44.4 (H) 0.0 - 8.2 mg/L   Gram stain    Collection Time: 02/08/17 10:26 AM   Result Value Ref Range    Gram Stain Result Many WBC's     Gram Stain Result No organisms seen        Assessment and Plan       Patient is a 41 y.o. male with a h/o Crohn's disease s/p bowel resection with abdominal wall and pelvic wall abscesses on CT.       Plan for IR drainage of abscesses today  Regular diet thereafter   Pain control  Continue ABX, transition to oral abx tomorrow   SW to assist with GI physician care for Crohn's disease       Jamaal Plasencia MD  Colon and Rectal Surgery Fellow  [phone]    I have personally obtained a history and performed a physical exam with and independent to my resident and discussed the findings and plan with the patient.  I agree with the above findings and plan with the following exceptions:  None    H, Benjamin Upton MD, FACS, FASCRS  Staff Surgeon  Dept of Colon and Rectal Surgery  [facility] [place], [place]

## 2017-02-09 NOTE — TELEPHONE ENCOUNTER
----- Message from Cassie Dunn LPN sent at 2/9/2017  3:18 PM CST -----  Contact: Pt:[phone]      ----- Message -----     From: Wilbert Rangel     Sent: 2/9/2017   2:46 PM       To: Won Alexander Staff    Pt called and states he would like to speak with Dr. Upton' nurse in regards to none of the pharmacies carrying the Rx oxycodone (ROXICODONE) 5 MG immediate release tablet.

## 2017-02-09 NOTE — TELEPHONE ENCOUNTER
Thinks [facility] pharmacy in [place] has the oxy IR, will call back if they do not have meds

## 2017-02-12 LAB
BACTERIA SPEC AEROBE CULT: NORMAL
BACTERIA SPEC AEROBE CULT: NORMAL

## 2017-02-13 LAB — BACTERIA SPEC ANAEROBE CULT: NORMAL

## 2017-02-13 NOTE — DISCHARGE SUMMARY
[facility]  Discharge Summary      Admit Date: 2/7/2017    Discharge Date and Time: 2/9/17    Attending Physician: Dr. Upton     Reason for Admission: abd pain    Procedures Performed: * No surgery found *    Hospital Course  Patient is a 41 y.o. male with a h/o Crohn's disease s/p bowel resection. He is a transfer from [facility] where he was admitted after presenting with two days of right-sided abdominal pain. He initially thought that the pain may have been a pulled muscle or a hernia. He did not have any nausea, vomiting, constipation, diarrhea, fevers, or chills. No changes in stool color, volume, or consistency. He has not noticed any blood in his stool. 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 There was also a small abscess within the right anterior pelvic wall. Received zosyn and flagyl at OSH. On arrival he is AFVSS. Pain is well-controlled.  IR was able to place drain in abscess 2/7/17, abd pain improved after procedure, diet was advanced.  GI consulted for Crohn's and recommended close fu but pt preferred to see GI doctor in area closer to him.  List of GI doctors in his area given to pt and per pt he had chosen one.  On discharge day he is rosanna diet, IR drain intact with purulent drainage, AF. VS stable and adequate pain control with oral meds, having soft bm daily.  Fu CT ordered, pt instructed on IR drain and keeping record of output, fu with Dr. Upton in one week and call and make appt to see local GI MD, pt aware of importance of close fu       Consults: none    Significant Diagnostic Studies: Labs: BMP: No results for input(s): GLU, NA, K, CL, CO2, BUN, CREATININE, CALCIUM, MG in the last 48 hours. and CBC No results for input(s):  WBC, HGB, HCT, PLT in the last 48 hours.    Final Diagnoses:    Principal Problem: Crohn disease   Secondary Diagnoses:   Active Hospital Problems    Diagnosis  POA    *Crohn disease [K50.90]  Yes      Resolved Hospital Problems    Diagnosis Date Resolved POA   No resolved problems to display.       Discharged Condition: good    Disposition: Home or Self Care    Follow Up/Patient Instructions:     Medications:  Reconciled Home Medications:   Discharge Medication List as of 2/9/2017 10:55 AM      START taking these medications    Details   ciprofloxacin HCl (CIPRO) 500 MG tablet Take 1 tablet (500 mg total) by mouth every 12 (twelve) hours., Starting 2/9/2017, Until Thu 2/16/17, Print      metronidazole (FLAGYL) 500 MG tablet Take 1 tablet (500 mg total) by mouth every 8 (eight) hours., Starting 2/9/2017, Until Thu 2/16/17, Print      oxycodone (ROXICODONE) 5 MG immediate release tablet Take 1 tablet (5 mg total) by mouth every 4 (four) hours as needed., Starting 2/9/2017, Until Discontinued, Print             Discharge Procedure Orders  Diet general     Call MD for:  temperature >100.4     Call MD for:  persistent nausea and vomiting or diarrhea     Call MD for:  severe uncontrolled pain     Call MD for:  redness, tenderness, or signs of infection (pain, swelling, redness, odor or green/yellow discharge around incision site)     Call MD for:  difficulty breathing or increased cough     Call MD for:   Scheduling Instructions: Record output from drain daily, keep that information and bring to appt in one week with Dr. Upton  Follow up with selected GI doctor for management of Crohn's       Follow-up Information     Follow up with JONNA Upton MD In 1 week.    Specialty:  Colon and Rectal Surgery    Why:  for drain removal, to have CT scan done prior to appt    Contact information:    [address]  [facility] [address]  [phone]          Follow up with JONNA Upton MD. Go on 2/15/2017.    Specialty:   Colon and Rectal Surgery    Why:  CT scan first, then see Dr Upton in Colon Rectal clinic, 4th floor    Contact information:    [address]  [facility] [id number] [phone]

## 2017-02-15 ENCOUNTER — OFFICE VISIT (OUTPATIENT)
Dept: SURGERY | Facility: CLINIC | Age: 42
End: 2017-02-15
Payer: COMMERCIAL

## 2017-02-15 ENCOUNTER — HOSPITAL ENCOUNTER (OUTPATIENT)
Dept: RADIOLOGY | Facility: HOSPITAL | Age: 42
Discharge: HOME | End: 2017-02-15
Attending: NURSE PRACTITIONER
Payer: COMMERCIAL

## 2017-02-15 VITALS
DIASTOLIC BLOOD PRESSURE: 60 MMHG | WEIGHT: 130.75 LBS | BODY MASS INDEX: 16.26 KG/M2 | SYSTOLIC BLOOD PRESSURE: 119 MMHG | HEIGHT: 75 IN | HEART RATE: 102 BPM

## 2017-02-15 DIAGNOSIS — K50.914 CROHN'S DISEASE WITH ABSCESS, UNSPECIFIED GASTROINTESTINAL TRACT LOCATION: ICD-10-CM

## 2017-02-15 DIAGNOSIS — K50.00 CROHN'S DISEASE INVOLVING TERMINAL ILEUM: ICD-10-CM

## 2017-02-15 DIAGNOSIS — L02.211 ABSCESS OF FLANK: Primary | ICD-10-CM

## 2017-02-15 PROBLEM — K50.014 CROHN'S DISEASE OF SMALL INTESTINE WITH ABSCESS: Status: ACTIVE | Noted: 2017-02-06

## 2017-02-15 PROCEDURE — 99999 PR PBB SHADOW E&M-EST. PATIENT-LVL III: CPT | Mod: PBBFAC,,, | Performed by: COLON & RECTAL SURGERY

## 2017-02-15 PROCEDURE — 74177 CT ABD & PELVIS W/CONTRAST: CPT | Mod: 26,,, | Performed by: RADIOLOGY

## 2017-02-15 PROCEDURE — 99213 OFFICE O/P EST LOW 20 MIN: CPT | Mod: S$GLB,,, | Performed by: COLON & RECTAL SURGERY

## 2017-02-15 PROCEDURE — 74177 CT ABD & PELVIS W/CONTRAST: CPT | Mod: TC

## 2017-02-15 PROCEDURE — 25500020 PHARM REV CODE 255: Performed by: NURSE PRACTITIONER

## 2017-02-15 RX ORDER — OXYCODONE AND ACETAMINOPHEN 5; 325 MG/1; MG/1
1 TABLET ORAL EVERY 4 HOURS PRN
Qty: 60 TABLET | Refills: 0 | Status: SHIPPED | OUTPATIENT
Start: 2017-02-15 | End: 2017-03-08

## 2017-02-15 RX ORDER — AMOXICILLIN AND CLAVULANATE POTASSIUM 875; 125 MG/1; MG/1
1 TABLET, FILM COATED ORAL EVERY 12 HOURS
Qty: 28 TABLET | Refills: 1 | Status: ON HOLD | OUTPATIENT
Start: 2017-02-15 | End: 2017-02-27 | Stop reason: HOSPADM

## 2017-02-15 RX ORDER — OXYCODONE HYDROCHLORIDE 5 MG/1
5 TABLET ORAL EVERY 4 HOURS PRN
Qty: 60 TABLET | Refills: 0 | Status: SHIPPED | OUTPATIENT
Start: 2017-02-15 | End: 2017-02-15

## 2017-02-15 RX ADMIN — IOHEXOL 15 ML: 350 INJECTION, SOLUTION INTRAVENOUS at 01:02

## 2017-02-15 RX ADMIN — IOHEXOL 15 ML: 350 INJECTION, SOLUTION INTRAVENOUS at 12:02

## 2017-02-15 RX ADMIN — IOHEXOL 75 ML: 350 INJECTION, SOLUTION INTRAVENOUS at 03:02

## 2017-02-15 NOTE — PROGRESS NOTES
"Feels well.  Tolerating diet.  No nausea or abd pain.  BMs formed.  No blood  Drain 10-20 ccs murky fluid    Appears well  Visit Vitals    /60    Pulse 102    Ht 6' 3" (1.905 m)    Wt 59.3 kg (130 lb 11.7 oz)    BMI 16.34 kg/m2     Abdomen soft NT ND  Flank drain - murky fluid    Assessment  Abdominal wall abscess, s/p perc drain  Crohn's disease, terminal ileum    Recommend  Change to Augmentin  Keep drain for another week  Review final results of CT  Referral to Dr Cr for GI consultation - Crohn's disease  "

## 2017-02-22 ENCOUNTER — OFFICE VISIT (OUTPATIENT)
Dept: SURGERY | Facility: CLINIC | Age: 42
End: 2017-02-22
Payer: COMMERCIAL

## 2017-02-22 VITALS
BODY MASS INDEX: 16.31 KG/M2 | WEIGHT: 130.5 LBS | HEART RATE: 108 BPM | DIASTOLIC BLOOD PRESSURE: 67 MMHG | SYSTOLIC BLOOD PRESSURE: 124 MMHG

## 2017-02-22 DIAGNOSIS — K50.014 CROHN'S DISEASE OF SMALL INTESTINE WITH ABSCESS: Primary | ICD-10-CM

## 2017-02-22 PROCEDURE — 1160F RVW MEDS BY RX/DR IN RCRD: CPT | Mod: S$GLB,,, | Performed by: COLON & RECTAL SURGERY

## 2017-02-22 PROCEDURE — 99999 PR PBB SHADOW E&M-EST. PATIENT-LVL II: CPT | Mod: PBBFAC,,, | Performed by: COLON & RECTAL SURGERY

## 2017-02-22 PROCEDURE — 99213 OFFICE O/P EST LOW 20 MIN: CPT | Mod: S$GLB,,, | Performed by: COLON & RECTAL SURGERY

## 2017-02-22 NOTE — PROGRESS NOTES
Reports that he is overall feeling better than when he originally presented to the hospital.  Tolerating diet; gained 5-6 pounds.  No nausea or abd pain.  BMs formed with no blood; about 2 per day since being switched to Augmentin.  Drain still putting out 10-20 ccs murky fluid.  No fever, chills.    Appears well  Visit Vitals    /67    Pulse 108    Wt 59.2 kg (130 lb 8.2 oz)    BMI 16.31 kg/m2     Abdomen soft NT ND  Flank drain - murky fluid    Assessment  Abdominal wall abscess, s/p perc drain  Crohn's disease, terminal ileum    Recommend  Finish current course of Augmentin  Keep drain for another 2 weeks  He is to see Dr Cr for GI consultation - Crohn's disease  RTC after GI consultation to discuss drain removal    CMeghan Beckett MD  PGY-3  Pager: [phone]      I have personally obtained a history and performed a physical exam with and independent to my resident and discussed the findings and plan with the patient.  I agree with the above findings and plan with the following exceptions:  None    H, Benjamin Upton MD, FACS, FASCRS  Staff Surgeon  Dept of Colon and Rectal Surgery  [facility] [place] [facility], [place]

## 2017-02-26 ENCOUNTER — HOSPITAL ENCOUNTER (INPATIENT)
Facility: HOSPITAL | Age: 42
LOS: 2 days | Discharge: HOME | DRG: 386 | End: 2017-03-01
Attending: EMERGENCY MEDICINE | Admitting: COLON & RECTAL SURGERY
Payer: COMMERCIAL

## 2017-02-26 DIAGNOSIS — R18.8 ABDOMINAL WALL FLUID COLLECTIONS: ICD-10-CM

## 2017-02-26 DIAGNOSIS — K50.014 CROHN'S DISEASE OF SMALL INTESTINE WITH ABSCESS: Primary | ICD-10-CM

## 2017-02-26 DIAGNOSIS — K50.914: ICD-10-CM

## 2017-02-26 LAB
ALBUMIN SERPL BCP-MCNC: 3.4 G/DL
ALP SERPL-CCNC: 90 U/L
ALT SERPL W/O P-5'-P-CCNC: 8 U/L
ANION GAP SERPL CALC-SCNC: 11 MMOL/L
AST SERPL-CCNC: 8 U/L
BASOPHILS # BLD AUTO: 0.03 K/UL
BASOPHILS NFR BLD: 0.2 %
BILIRUB SERPL-MCNC: 0.8 MG/DL
BUN SERPL-MCNC: 11 MG/DL
CALCIUM SERPL-MCNC: 9.3 MG/DL
CHLORIDE SERPL-SCNC: 101 MMOL/L
CO2 SERPL-SCNC: 25 MMOL/L
CREAT SERPL-MCNC: 0.7 MG/DL
DIFFERENTIAL METHOD: ABNORMAL
EOSINOPHIL # BLD AUTO: 0.1 K/UL
EOSINOPHIL NFR BLD: 0.7 %
ERYTHROCYTE [DISTWIDTH] IN BLOOD BY AUTOMATED COUNT: 16.8 %
EST. GFR  (AFRICAN AMERICAN): >60 ML/MIN/1.73 M^2
EST. GFR  (NON AFRICAN AMERICAN): >60 ML/MIN/1.73 M^2
GLUCOSE SERPL-MCNC: 87 MG/DL
HCT VFR BLD AUTO: 35.2 %
HGB BLD-MCNC: 11 G/DL
LYMPHOCYTES # BLD AUTO: 2.2 K/UL
LYMPHOCYTES NFR BLD: 18.4 %
MCH RBC QN AUTO: 24.6 PG
MCHC RBC AUTO-ENTMCNC: 31.3 %
MCV RBC AUTO: 79 FL
MONOCYTES # BLD AUTO: 1.5 K/UL
MONOCYTES NFR BLD: 12.3 %
NEUTROPHILS # BLD AUTO: 8.3 K/UL
NEUTROPHILS NFR BLD: 68.2 %
PLATELET # BLD AUTO: 368 K/UL
PMV BLD AUTO: 9.2 FL
POTASSIUM SERPL-SCNC: 4.2 MMOL/L
PROT SERPL-MCNC: 8.1 G/DL
RBC # BLD AUTO: 4.48 M/UL
SODIUM SERPL-SCNC: 137 MMOL/L
WBC # BLD AUTO: 12.16 K/UL

## 2017-02-26 PROCEDURE — 96376 TX/PRO/DX INJ SAME DRUG ADON: CPT

## 2017-02-26 PROCEDURE — 99284 EMERGENCY DEPT VISIT MOD MDM: CPT | Mod: 25

## 2017-02-26 PROCEDURE — 0W9F30Z DRAINAGE OF ABDOMINAL WALL WITH DRAINAGE DEVICE, PERCUTANEOUS APPROACH: ICD-10-PCS | Performed by: COLON & RECTAL SURGERY

## 2017-02-26 PROCEDURE — 80053 COMPREHEN METABOLIC PANEL: CPT

## 2017-02-26 PROCEDURE — 25000003 PHARM REV CODE 250: Performed by: STUDENT IN AN ORGANIZED HEALTH CARE EDUCATION/TRAINING PROGRAM

## 2017-02-26 PROCEDURE — 20600001 HC STEP DOWN PRIVATE ROOM

## 2017-02-26 PROCEDURE — 85025 COMPLETE CBC W/AUTO DIFF WBC: CPT

## 2017-02-26 PROCEDURE — 96375 TX/PRO/DX INJ NEW DRUG ADDON: CPT

## 2017-02-26 PROCEDURE — 93005 ELECTROCARDIOGRAM TRACING: CPT

## 2017-02-26 PROCEDURE — 93010 ELECTROCARDIOGRAM REPORT: CPT | Mod: ,,, | Performed by: INTERNAL MEDICINE

## 2017-02-26 PROCEDURE — 25500020 PHARM REV CODE 255: Performed by: EMERGENCY MEDICINE

## 2017-02-26 PROCEDURE — 99223 1ST HOSP IP/OBS HIGH 75: CPT | Mod: ,,, | Performed by: COLON & RECTAL SURGERY

## 2017-02-26 PROCEDURE — G0378 HOSPITAL OBSERVATION PER HR: HCPCS

## 2017-02-26 PROCEDURE — 63600175 PHARM REV CODE 636 W HCPCS: Performed by: STUDENT IN AN ORGANIZED HEALTH CARE EDUCATION/TRAINING PROGRAM

## 2017-02-26 PROCEDURE — 96361 HYDRATE IV INFUSION ADD-ON: CPT

## 2017-02-26 PROCEDURE — 99285 EMERGENCY DEPT VISIT HI MDM: CPT | Mod: ,,, | Performed by: EMERGENCY MEDICINE

## 2017-02-26 PROCEDURE — 96374 THER/PROPH/DIAG INJ IV PUSH: CPT

## 2017-02-26 RX ORDER — LIDOCAINE HYDROCHLORIDE AND EPINEPHRINE 10; 10 MG/ML; UG/ML
10 INJECTION, SOLUTION INFILTRATION; PERINEURAL ONCE
Status: COMPLETED | OUTPATIENT
Start: 2017-02-26 | End: 2017-02-26

## 2017-02-26 RX ORDER — GLUCAGON 1 MG
1 KIT INJECTION
Status: DISCONTINUED | OUTPATIENT
Start: 2017-02-26 | End: 2017-02-27

## 2017-02-26 RX ORDER — DEXTROSE MONOHYDRATE 100 MG/ML
1000 INJECTION, SOLUTION INTRAVENOUS
Status: DISCONTINUED | OUTPATIENT
Start: 2017-02-26 | End: 2017-02-26

## 2017-02-26 RX ORDER — IBUPROFEN 200 MG
16 TABLET ORAL
Status: DISCONTINUED | OUTPATIENT
Start: 2017-02-26 | End: 2017-02-27

## 2017-02-26 RX ORDER — ENOXAPARIN SODIUM 100 MG/ML
40 INJECTION SUBCUTANEOUS EVERY 24 HOURS
Status: DISCONTINUED | OUTPATIENT
Start: 2017-02-27 | End: 2017-03-01 | Stop reason: HOSPADM

## 2017-02-26 RX ORDER — HYDROMORPHONE HYDROCHLORIDE 1 MG/ML
1 INJECTION, SOLUTION INTRAMUSCULAR; INTRAVENOUS; SUBCUTANEOUS ONCE
Status: COMPLETED | OUTPATIENT
Start: 2017-02-26 | End: 2017-02-26

## 2017-02-26 RX ORDER — HYDROCODONE BITARTRATE AND ACETAMINOPHEN 5; 325 MG/1; MG/1
1 TABLET ORAL EVERY 4 HOURS PRN
Status: DISCONTINUED | OUTPATIENT
Start: 2017-02-26 | End: 2017-03-01 | Stop reason: HOSPADM

## 2017-02-26 RX ORDER — IBUPROFEN 200 MG
24 TABLET ORAL
Status: DISCONTINUED | OUTPATIENT
Start: 2017-02-26 | End: 2017-02-27

## 2017-02-26 RX ORDER — HYDROMORPHONE HYDROCHLORIDE 1 MG/ML
1 INJECTION, SOLUTION INTRAMUSCULAR; INTRAVENOUS; SUBCUTANEOUS EVERY 4 HOURS PRN
Status: DISCONTINUED | OUTPATIENT
Start: 2017-02-26 | End: 2017-03-01 | Stop reason: HOSPADM

## 2017-02-26 RX ORDER — SODIUM CHLORIDE 0.9 % (FLUSH) 0.9 %
3 SYRINGE (ML) INJECTION EVERY 8 HOURS
Status: DISCONTINUED | OUTPATIENT
Start: 2017-02-26 | End: 2017-03-01 | Stop reason: HOSPADM

## 2017-02-26 RX ORDER — NALOXONE HCL 0.4 MG/ML
0.02 VIAL (ML) INJECTION
Status: DISCONTINUED | OUTPATIENT
Start: 2017-02-26 | End: 2017-03-01 | Stop reason: HOSPADM

## 2017-02-26 RX ORDER — ACETAMINOPHEN 325 MG/1
650 TABLET ORAL EVERY 4 HOURS PRN
Status: DISCONTINUED | OUTPATIENT
Start: 2017-02-26 | End: 2017-03-01 | Stop reason: HOSPADM

## 2017-02-26 RX ORDER — AMOXICILLIN AND CLAVULANATE POTASSIUM 875; 125 MG/1; MG/1
1 TABLET, FILM COATED ORAL EVERY 12 HOURS
Status: DISCONTINUED | OUTPATIENT
Start: 2017-02-26 | End: 2017-02-27

## 2017-02-26 RX ADMIN — SODIUM CHLORIDE 1000 ML: 0.9 INJECTION, SOLUTION INTRAVENOUS at 03:02

## 2017-02-26 RX ADMIN — HYDROMORPHONE HYDROCHLORIDE 1 MG: 1 INJECTION, SOLUTION INTRAMUSCULAR; INTRAVENOUS; SUBCUTANEOUS at 10:02

## 2017-02-26 RX ADMIN — Medication 3 ML: at 10:02

## 2017-02-26 RX ADMIN — AMOXICILLIN AND CLAVULANATE POTASSIUM 1 TABLET: 875; 125 TABLET, FILM COATED ORAL at 10:02

## 2017-02-26 RX ADMIN — IOHEXOL 75 ML: 350 INJECTION, SOLUTION INTRAVENOUS at 07:02

## 2017-02-26 RX ADMIN — LIDOCAINE HYDROCHLORIDE AND EPINEPHRINE 10 ML: 10; 10 INJECTION, SOLUTION INFILTRATION; PERINEURAL at 09:02

## 2017-02-26 RX ADMIN — HYDROMORPHONE HYDROCHLORIDE 1 MG: 1 INJECTION, SOLUTION INTRAMUSCULAR; INTRAVENOUS; SUBCUTANEOUS at 08:02

## 2017-02-26 NOTE — ED NOTES
LOC: The patient is awake, alert, and oriented to place, time, situation. Affect is appropriate.  Speech is appropriate and clear.     APPEARANCE: Patient resting on stretcher; appears uncomfortable but in no acute distress.  Patient is clean and well groomed.    SKIN: The skin is warm and dry; color consistent with ethnicity.  Patient has normal skin turgor and moist mucus membranes.  Skin intact; no breakdown or bruising noted. Redness and swelling noted to the right lower abdomen.    MUSCULOSKELETAL: Patient moving upper and lower extremities without difficulty.  Denies weakness.     RESPIRATORY: Airway is open and patent. Respirations spontaneous, even, easy, and non-labored.  Patient has a normal effort and rate.  No accessory muscle use noted. Denies cough.     CARDIAC:  Normal rhythm and rate noted.  No peripheral edema noted. No complaints of chest pain.      ABDOMEN: Soft and non tender to palpation.  No distention noted.     NEUROLOGIC: Eyes open spontaneously.  Behavior appropriate to situation.  Follows commands; facial expression symmetrical.  Purposeful motor response noted; normal sensation in all extremities.

## 2017-02-26 NOTE — ED TRIAGE NOTES
Pt to the ER c/o worsening right lower quadrant anterior abdomnal wall fluid collection. Pt was recently in hospital c/o abd pain and CT showed gluteal fluid collection and R anterior abdominal wall fluid collection.

## 2017-02-26 NOTE — PROVIDER PROGRESS NOTES - EMERGENCY DEPT.
Encounter Date: 2/26/2017    ED Physician Progress Notes         EKG - STEMI Decision  Initial Reading: No STEMI present.

## 2017-02-26 NOTE — IP AVS SNAPSHOT
[facility]  [address]  [place] [address]  Phone: [phone]           Patient Discharge Instructions     Our goal is to set you up for success. This packet includes information on your condition, medications, and your home care. It will help you to care for yourself so you don't get sicker and need to go back to the hospital.     Please ask your nurse if you have any questions.        There are many details to remember when preparing to leave the hospital. Here is what you will need to do:    1. Take your medicine. If you are prescribed medications, review your Medication List in the following pages. You may have new medications to  at the pharmacy and others that you'll need to stop taking. Review the instructions for how and when to take your medications. Talk with your doctor or nurses if you are unsure of what to do.     2. Go to your follow-up appointments. Specific follow-up information is listed in the following pages. Your may be contacted by a transition nurse or clinical provider about future appointments. Be sure we have all of the phone numbers to reach you, if needed. Please contact your provider's office if you are unable to make an appointment.     3. Watch for warning signs. Your doctor or nurse will give you detailed warning signs to watch for and when to call for assistance. These instructions may also include educational information about your condition. If you experience any of warning signs to your health, call your doctor.               ** Verify the list of medication(s) below is accurate and up to date. Carry this with you in case of emergency. If your medications have changed, please notify your healthcare provider.             Medication List      START taking these medications        Additional Info                      ciprofloxacin HCl 500 MG tablet   Commonly known as:  CIPRO   Quantity:  14 tablet   Refills:  0   Dose:  500 mg     Instructions:  Take 1 tablet (500 mg total) by mouth every 12 (twelve) hours.     Begin Date    AM    Noon    PM    Bedtime         CONTINUE taking these medications        Additional Info                      oxycodone-acetaminophen 5-325 mg per tablet   Commonly known as:  PERCOCET   Quantity:  60 tablet   Refills:  0   Dose:  1 tablet    Instructions:  Take 1 tablet by mouth every 4 (four) hours as needed for Pain.     Begin Date    AM    Noon    PM    Bedtime         STOP taking these medications     amoxicillin-clavulanate 875-125mg 875-125 mg per tablet   Commonly known as:  AUGMENTIN            Where to Get Your Medications      You can get these medications from any pharmacy     Bring a paper prescription for each of these medications     ciprofloxacin HCl 500 MG tablet                  Please bring to all follow up appointments:    1. A copy of your discharge instructions.  2. All medicines you are currently taking in their original bottles.  3. Identification and insurance card.    Please arrive 15 minutes ahead of scheduled appointment time.    Please call 24 hours in advance if you must reschedule your appointment and/or time.        Your Scheduled Appointments     Mar 08, 2017  1:45 PM CST   Post OP with OKSANA Upton MD   [facility]-Colon and Rectal Surg ([facility] )    [address]  [place] [address] [phone]              Follow-up Information     Follow up with JONNA Upton MD In 2 weeks.    Specialty:  Colon and Rectal Surgery    Why:  For post op evaluation and care    Contact information:    [address] [facility] [address] [phone]          Follow up with Lorena Cr MD On 3/6/2017.    Specialty:  Internal Medicine    Contact information:    [address]  [address] [place] [id number] [phone]          Discharge Instructions     Future Orders    Diet general     Questions:    Total calories:      Fat restriction, if any:       Protein restriction, if any:      Na restriction, if any:      Fluid restriction:      Additional restrictions:          Primary Diagnosis     Your primary diagnosis was:  Inflammatory Bowel Disease (Crohn's Disease)      Admission Information     Date & Time Provider Department CSN    2/26/2017  2:45 PM OKSANA Upton MD [facility] [id number]      Care Providers     Provider Role Specialty Primary office phone    OKSANA Upton MD Attending Provider Colon and Rectal Surgery [phone]    Aleja Donaldson MD Consulting Physician  Gastroenterology [phone]    Celestino Lopez MD Consulting Physician  Gastroenterology [phone]      Your Vitals Were     BP                   111/62 (BP Location: Left arm, Patient Position: Sitting, BP Method: Automatic)           Recent Lab Values     No lab values to display.      Allergies as of 2/27/2017     No Known Allergies      [facility]     [facility] Nurse Care Line - 24/7 Assistance  Unless otherwise directed by your provider, please contact [facility], our nurse care line that is available for 24/7 assistance.     Registered nurses in the [facility] Center provide clinical advisement, health education, appointment booking, and other advisory services.  Call for this free service at [phone].        Advance Directives     An advance directive is a document which, in the event you are no longer able to make decisions for yourself, tells your healthcare team what kind of treatment you do or do not want to receive, or who you would like to make those decisions for you.  If you do not currently have an advance directive, [facility] encourages you to create one.  For more information call:  [phone]), [phone] ([phone]),  or log on to [email or web address].        Smoking Cessation     If you would like to quit smoking:   You may be eligible for free services if you are a [place] resident  and started smoking cigarettes before September 1, 1988.  Call the Smoking Cessation Trust (SCT) toll free at (678) 574-0547 or (156) 521-7078.   Call 1-800-QUIT-NOW if you do not meet the above criteria.            Language Assistance Services     ATTENTION: Language assistance services are available, free of charge. Please call [phone].      ATENCIÓN: Si habla trina, tiene a regan disposición servicios gratuitos de asistencia lingüística. Llame al [phone].     CHÚ Ý: N?u b?n nói Ti?ng Vi?t, có các d?ch v? h? tr? ngôn ng? mi?n phí dành cho b?n. G?i s? [phone].        [facility] Sign-Up     Activating your [facility] account is as easy as 1-2-3!     1) Visit [email or web address], select Sign Up Now, enter this activation code and your date of birth, then select Next.  [email or web address]  Expires: 3/26/2017  7:53 AM      2) Create a username and password to use when you visit [facility] in the future and select a security question in case you lose your password and select Next.    3) Enter your e-mail address and click Sign Up!    Additional Information  If you have questions, please e-mail [email or web address] or call [phone] to talk to our [facility] staff. Remember, [facility] is NOT to be used for urgent needs. For medical emergencies, dial 911.          [facility] complies with applicable Federal civil rights laws and does not discriminate on the basis of race, color, national origin, age, disability, or sex.

## 2017-02-26 NOTE — ED PROVIDER NOTES
Encounter Date: 2/26/2017       History     Chief Complaint   Patient presents with    Abdominal Pain     possible infection     Review of patient's allergies indicates:  No Known Allergies  HPI Comments: 41yoM c PMH s/f Crohn's disease s/p multiple bowel resections presents to ER c/o worsening R anterior abdomnal wall fluid collection. Pt was recently in hospital c/o abd pain and CT showed gluteal fluid collection and R anterior abdominal wall fluid collection. Underwent successful drain placement into gluteal fluid collection. Still has drain in place. However, IR could not aspirate anything from R anterior abdominal wall fluid collection. Went home and improved initially. Placed on Augmentin by Dr. Upton with CRS. However, over last couple of days, R anterior abdominal wall fluid collection has worsened. Now more tender, larger, and warmer. Drain output from gluteal fluid collection still ok. Appetite fine. Denies any other abdominal sx, including diarrhea, nausea, emesis. Denies fevers but thinks he's had some chills.     The history is provided by the patient and medical records. No  was used.     No past medical history on file.  No past surgical history on file.  No family history on file.  Social History   Substance Use Topics    Smoking status: Current Every Day Smoker     Packs/day: 0.25     Years: 10.00     Types: Cigarettes    Smokeless tobacco: Not on file    Alcohol use No     Review of Systems   Constitutional: Positive for chills. Negative for activity change, appetite change and fever.   HENT: Negative for sore throat.    Respiratory: Negative for shortness of breath.    Cardiovascular: Negative for chest pain.   Gastrointestinal: Negative for abdominal pain, blood in stool, constipation, nausea and vomiting.   Genitourinary: Negative for dysuria.   Musculoskeletal: Negative for back pain.   Skin:        + for abscess   Neurological: Negative for weakness.   Hematological:  Does not bruise/bleed easily.       Physical Exam   Initial Vitals   BP Pulse Resp Temp SpO2   02/26/17 1111 02/26/17 1111 02/26/17 1111 02/26/17 1111 02/26/17 1111   129/70 115 18 98 °F (36.7 °C) 100 %     Physical Exam    Nursing note and vitals reviewed.  Constitutional: He appears well-developed and well-nourished. He is not diaphoretic. No distress.   HENT:   Head: Normocephalic and atraumatic.   Right Ear: External ear normal.   Left Ear: External ear normal.   Nose: Nose normal.   Mouth/Throat: Oropharynx is clear and moist. No oropharyngeal exudate.   Eyes: Conjunctivae and EOM are normal. Pupils are equal, round, and reactive to light. Right eye exhibits no discharge. Left eye exhibits no discharge. No scleral icterus.   Neck: Normal range of motion. Neck supple. No thyromegaly present. No tracheal deviation present. No JVD present.   Cardiovascular: Normal rate, regular rhythm, normal heart sounds and intact distal pulses. Exam reveals no gallop and no friction rub.    No murmur heard.  Pulmonary/Chest: No stridor. No respiratory distress. He has no wheezes. He has no rhonchi. He has no rales. He exhibits no tenderness.   Abdominal: Soft. Bowel sounds are normal. He exhibits no distension and no mass. There is no tenderness. There is no rebound and no guarding.   Gluteal drain with purulent output   Musculoskeletal: Normal range of motion. He exhibits no edema or tenderness.   Lymphadenopathy:     He has no cervical adenopathy.   Neurological: He is alert and oriented to person, place, and time. He has normal strength and normal reflexes. He displays normal reflexes. No cranial nerve deficit or sensory deficit.   Skin:   RLQ abdominal wall with 4-5 cm erythemaous area of swelling, TTP, fluctuance felt, warm    Psychiatric: He has a normal mood and affect. His behavior is normal. Judgment and thought content normal.         ED Course   Procedures  Labs Reviewed   CBC W/ AUTO DIFFERENTIAL   COMPREHENSIVE  METABOLIC PANEL             Medical Decision Making:   History:   Old Medical Records: I decided to obtain old medical records.  Initial Assessment:   41yoM c PMH s/f Crohn's presenting to ER with worsening RLQ anterior abdominal wall fluid collection.  Differential Diagnosis:   Abscess, seroma, hematoma  Clinical Tests:   Lab Tests: Ordered and Reviewed  Radiological Study: Ordered and Reviewed  ED Management:  Concern for persistent/worsening abscess. IR unable to aspirate anything from fluid collection. Will obtain CT abd/pelvis. Given this previous attempt, will consult general surgery. Further plans pending their recommendations.     8:53 PM CT completed. Enlarged, worsened collection. CRS consultation complete. They will perform bedside I&D with drain placement. Plan to admit to their service.               Attending Attestation:   Physician Attestation Statement for Resident:  As the supervising MD   Physician Attestation Statement: I have personally seen and examined this patient.   I agree with the above history. -: 40 y/o M h/o Crohns s/p multiple bowel resections recently admitted for IR drainage/drain placement of gluteal abscess. Had small anterior abd wall fluid collection at that time. Sischarged on abx, since discharge abdominal wall fluid collection has increased in size. Area is warm and red. Denies fevers. Pt has no other complaints.    As the supervising MD I agree with the above PE.    As the supervising MD I agree with the above treatment, course, plan, and disposition.  I have reviewed and agree with the residents interpretation of the following: lab data.                    ED Course     Clinical Impression:   Anterior abdominal wall fluid collection.     Disposition:   Disposition: Admitted  Condition: Stable       Teresa Pierce MD  02/27/17 1013

## 2017-02-27 VITALS
RESPIRATION RATE: 16 BRPM | TEMPERATURE: 99 F | HEART RATE: 81 BPM | SYSTOLIC BLOOD PRESSURE: 111 MMHG | DIASTOLIC BLOOD PRESSURE: 62 MMHG | WEIGHT: 130 LBS | OXYGEN SATURATION: 95 % | BODY MASS INDEX: 16.16 KG/M2 | HEIGHT: 75 IN

## 2017-02-27 LAB
ALBUMIN SERPL BCP-MCNC: 2.8 G/DL
ALBUMIN SERPL BCP-MCNC: 2.9 G/DL
ANION GAP SERPL CALC-SCNC: 5 MMOL/L
ANISOCYTOSIS BLD QL SMEAR: SLIGHT
BASOPHILS # BLD AUTO: 0.03 K/UL
BASOPHILS NFR BLD: 0.2 %
BUN SERPL-MCNC: 11 MG/DL
CALCIUM SERPL-MCNC: 9.1 MG/DL
CHLORIDE SERPL-SCNC: 104 MMOL/L
CO2 SERPL-SCNC: 28 MMOL/L
CREAT SERPL-MCNC: 0.8 MG/DL
CRP SERPL-MCNC: 46 MG/L
CRP SERPL-MCNC: 50.3 MG/L
DIFFERENTIAL METHOD: ABNORMAL
EOSINOPHIL # BLD AUTO: 0.1 K/UL
EOSINOPHIL NFR BLD: 1 %
ERYTHROCYTE [DISTWIDTH] IN BLOOD BY AUTOMATED COUNT: 16.7 %
EST. GFR  (AFRICAN AMERICAN): >60 ML/MIN/1.73 M^2
EST. GFR  (NON AFRICAN AMERICAN): >60 ML/MIN/1.73 M^2
GLUCOSE SERPL-MCNC: 93 MG/DL
HCT VFR BLD AUTO: 31.9 %
HGB BLD-MCNC: 10 G/DL
LYMPHOCYTES # BLD AUTO: 2.3 K/UL
LYMPHOCYTES NFR BLD: 18.7 %
MCH RBC QN AUTO: 24.4 PG
MCHC RBC AUTO-ENTMCNC: 31.3 %
MCV RBC AUTO: 78 FL
MONOCYTES # BLD AUTO: 2 K/UL
MONOCYTES NFR BLD: 16.4 %
NEUTROPHILS # BLD AUTO: 7.9 K/UL
NEUTROPHILS NFR BLD: 63.7 %
PLATELET # BLD AUTO: 341 K/UL
PLATELET BLD QL SMEAR: ABNORMAL
PMV BLD AUTO: 8.9 FL
POLYCHROMASIA BLD QL SMEAR: ABNORMAL
POTASSIUM SERPL-SCNC: 5.1 MMOL/L
PREALB SERPL-MCNC: 17 MG/DL
RBC # BLD AUTO: 4.09 M/UL
SODIUM SERPL-SCNC: 137 MMOL/L
WBC # BLD AUTO: 12.37 K/UL

## 2017-02-27 PROCEDURE — 99232 SBSQ HOSP IP/OBS MODERATE 35: CPT | Mod: ,,, | Performed by: COLON & RECTAL SURGERY

## 2017-02-27 PROCEDURE — 82040 ASSAY OF SERUM ALBUMIN: CPT | Mod: 91

## 2017-02-27 PROCEDURE — 86140 C-REACTIVE PROTEIN: CPT

## 2017-02-27 PROCEDURE — 63600175 PHARM REV CODE 636 W HCPCS: Performed by: STUDENT IN AN ORGANIZED HEALTH CARE EDUCATION/TRAINING PROGRAM

## 2017-02-27 PROCEDURE — 85025 COMPLETE CBC W/AUTO DIFF WBC: CPT

## 2017-02-27 PROCEDURE — 25000003 PHARM REV CODE 250: Performed by: STUDENT IN AN ORGANIZED HEALTH CARE EDUCATION/TRAINING PROGRAM

## 2017-02-27 PROCEDURE — 99254 IP/OBS CNSLTJ NEW/EST MOD 60: CPT | Mod: ,,, | Performed by: INTERNAL MEDICINE

## 2017-02-27 PROCEDURE — 86140 C-REACTIVE PROTEIN: CPT | Mod: 91

## 2017-02-27 PROCEDURE — 36415 COLL VENOUS BLD VENIPUNCTURE: CPT

## 2017-02-27 PROCEDURE — 84134 ASSAY OF PREALBUMIN: CPT

## 2017-02-27 PROCEDURE — 20600001 HC STEP DOWN PRIVATE ROOM

## 2017-02-27 PROCEDURE — 82040 ASSAY OF SERUM ALBUMIN: CPT

## 2017-02-27 PROCEDURE — 63600175 PHARM REV CODE 636 W HCPCS: Performed by: SURGERY

## 2017-02-27 PROCEDURE — 80048 BASIC METABOLIC PNL TOTAL CA: CPT

## 2017-02-27 RX ORDER — CIPROFLOXACIN 2 MG/ML
400 INJECTION, SOLUTION INTRAVENOUS
Status: DISCONTINUED | OUTPATIENT
Start: 2017-02-27 | End: 2017-03-01 | Stop reason: HOSPADM

## 2017-02-27 RX ORDER — OXYCODONE AND ACETAMINOPHEN 5; 325 MG/1; MG/1
1 TABLET ORAL EVERY 4 HOURS PRN
Qty: 31 TABLET | Refills: 0 | Status: SHIPPED | OUTPATIENT
Start: 2017-02-27 | End: 2017-03-08 | Stop reason: SDUPTHER

## 2017-02-27 RX ORDER — CIPROFLOXACIN 500 MG/1
500 TABLET ORAL EVERY 12 HOURS
Qty: 14 TABLET | Refills: 0 | Status: SHIPPED | OUTPATIENT
Start: 2017-02-27 | End: 2017-03-06

## 2017-02-27 RX ADMIN — HYDROCODONE BITARTRATE AND ACETAMINOPHEN 1 TABLET: 5; 325 TABLET ORAL at 10:02

## 2017-02-27 RX ADMIN — HYDROCODONE BITARTRATE AND ACETAMINOPHEN 1 TABLET: 5; 325 TABLET ORAL at 07:02

## 2017-02-27 RX ADMIN — CIPROFLOXACIN 400 MG: 2 INJECTION, SOLUTION INTRAVENOUS at 06:02

## 2017-02-27 RX ADMIN — HYDROCODONE BITARTRATE AND ACETAMINOPHEN 1 TABLET: 5; 325 TABLET ORAL at 06:02

## 2017-02-27 RX ADMIN — HYDROCODONE BITARTRATE AND ACETAMINOPHEN 1 TABLET: 5; 325 TABLET ORAL at 01:02

## 2017-02-27 RX ADMIN — Medication 3 ML: at 06:02

## 2017-02-27 RX ADMIN — HYDROMORPHONE HYDROCHLORIDE 1 MG: 1 INJECTION, SOLUTION INTRAMUSCULAR; INTRAVENOUS; SUBCUTANEOUS at 01:02

## 2017-02-27 RX ADMIN — HYDROCODONE BITARTRATE AND ACETAMINOPHEN 1 TABLET: 5; 325 TABLET ORAL at 02:02

## 2017-02-27 RX ADMIN — HYDROMORPHONE HYDROCHLORIDE 1 MG: 1 INJECTION, SOLUTION INTRAMUSCULAR; INTRAVENOUS; SUBCUTANEOUS at 08:02

## 2017-02-27 RX ADMIN — HYDROMORPHONE HYDROCHLORIDE 1 MG: 1 INJECTION, SOLUTION INTRAMUSCULAR; INTRAVENOUS; SUBCUTANEOUS at 05:02

## 2017-02-27 RX ADMIN — HYDROMORPHONE HYDROCHLORIDE 1 MG: 1 INJECTION, SOLUTION INTRAMUSCULAR; INTRAVENOUS; SUBCUTANEOUS at 03:02

## 2017-02-27 NOTE — CONSULTS
"                                                [facility]           Inflammatory Bowel Disease New Patient Consultation Note                TODAY'S VISIT DATE:  2/27/2017    Reason for Consult:    Chief Complaint   Patient presents with    Abdominal Pain     possible infection       PCP: Primary Doctor No      History of Present Illness:  41 year old man with a past medical history of Crohn's disease diagnosed 20 years ago. At the time of diagnosis he underwent bowel resection (no records of operation, pt is unaware of how much was resected). Pt claims to have been initially treated with "sulfa drugs" but ceased taking these 10 years ago, from his description sounds like sulfasalazine. Since then he has not taken any immunosuppressants, and visits walk-in clinics for steroids whenever he has abdominal pain, which he states has occurred relatively frequently over the years.     Pt reports his last colonoscopy was 10 years ago. He does not recall ever having undergone upper endoscopy.     He recently presented to [facility] with a right colon to right lateral abdominal wall fistula with loculated fluid collection, and right buttock fluid collection. A small abscess was also noted in the right anterior pelvic wall. Zosyn and flagyl was commenced with a drain placed by IR in the gluteal abscess on 2/7/17. This resolved pain. Pt attended follow-up appointments. Was switched to Augmentin on 02/15. Most recent appointment was 02/22. Drain O/P was 10ccs of murky fluid. Was advised to continue abx and follow up with local GI doctor in two weeks, for discussion of drain removal and long term immunosuppressive therapy.     Current presentation due to the development of swelling, erythema and tenderness of anterior right anterior pelvis. [place] [facility] [place] and IR placed a small drain in his anterior abscess at bedside. Pt now reports improvement of his symptoms with Augmentin treatment " continuing    Pertinent Endoscopy/Imaging:    CT abdomen and pelvis with contrast 2/26/17:  Mild interval increase in size of a right abdominal wall air containing collection with fistulous tract arising from the anterolateral margin of the ascending colon and suspected cutaneous fistula in the right lower quadrant of the abdomen.  A decompressed collection is present in the right gluteal region with pigtail catheter in place.  Only a small amount of fluid is present within these collections.  Mild thickening of the distal ileum this patient with history of Crohn's disease.    CT abdomen and pelvis with contrast 2/15/17:  Essentially complete resolution of right gluteal fluid collection with a drainage catheter in place.decrease in the size of the right pelvic anterior abdominal wall collection with only a small amount of air and fluid remaining.  Some muscular soft tissue stranding does persist. Increased number of left para-aortic nodes.    Pertinent Labs:  No results found for: STOOLCULTURE, XGPFBDAGHS6N, WSAINGRKHS0O, CDIFFICILEAN, CDIFFTOX, CDIFFICILEBY  Lab Results   Component Value Date    CRP 46.0 (H) 02/27/2017     No results found for: KNFNAISQ48ID  No results found for: HEPBIGM, HEPBCAB, HEPBSURFABQU  No results found for: VARICELLAZOS, VARICELLAINT  No results found for: NIL, TBAG, TBAGNIL, MITOGENNIL, TBGOLD  No results found for: TPMTRESULT  No results found for: ANSADAINIT, INFLIXIMAB, INFLIXINTERP    Prior IBD Therapies:  Sulfasalazine     Current IBD Therapies:  None     NSAID use/indication:  Denies     ROS  Constitutional: no fever, chills, unintentionally lost 10 lb of wt in the last two months   Eyes: no visual changes   ENT: no sore throat or dysphagia  Respiratory: no cough or shortness of breath   Cardiovascular: no chest pain or palpitations   Gastrointestinal: as per HPI  Hematologic/Lymphatic: no easy bruising or lymphadenopathy   Musculoskeletal: no arthralgias or myalgias   Neurological:  no seizures, tremors or change in mental status  Behavioral/Psych: no auditory or visual hallucinations    Medical/Surgical History:    has no past medical history on file.   has no past surgical history on file.    Family History: family history is not on file..     Social History:  reports that he has been smoking Cigarettes.  He has a 2.50 pack-year smoking history. He does not have any smokeless tobacco history on file. He reports that he does not drink alcohol or use illicit drugs.    Review of patient's allergies indicates:  No Known Allergies    Outpatient Prescriptions Marked as Taking for the 2/26/17 encounter (Hospital Encounter)   Medication Sig Dispense Refill    amoxicillin-clavulanate 875-125mg (AUGMENTIN) 875-125 mg per tablet Take 1 tablet by mouth every 12 (twelve) hours. 28 tablet 1    oxycodone-acetaminophen (PERCOCET) 5-325 mg per tablet Take 1 tablet by mouth every 4 (four) hours as needed for Pain. 60 tablet 0       Vital Signs:  BP: 111/62 Temp: 98.7 °F (37.1 °C) Pulse: 81 Resp: 16  Weight: 59 kg (130 lb)    Physical Exam   Constitutional: He is oriented to person, place, and time. No distress.   HENT:   Head: Normocephalic and atraumatic.   Eyes: Conjunctivae are normal. No scleral icterus.   Neck: Normal range of motion. Neck supple.   Cardiovascular: Normal rate and regular rhythm.  Exam reveals no gallop and no friction rub.    No murmur heard.  Pulmonary/Chest: Effort normal and breath sounds normal.   Abdominal:   Abdomen: soft, nt, nd. +localized tender over well healed mcburney's incision with blanching erythema and crepitus  Drainage: purulent to IR drain. Site clean.   Neurological: He is alert and oriented to person, place, and time.   Skin: Skin is warm and dry. He is not diaphoretic.   Psychiatric: His behavior is normal.       Labs:  No results found for: STOOLCULTURE, NJSGZUQUHB6I, RJCSJYABEL6D, CDIFFICILEAN, CDIFFTOX, CDIFFICILEBY  Lab Results   Component Value Date    WBC  12.37 02/27/2017    HGB 10.0 (L) 02/27/2017    HCT 31.9 (L) 02/27/2017    MCV 78 (L) 02/27/2017     02/27/2017    ALT 8 (L) 02/26/2017    AST 8 (L) 02/26/2017    ALKPHOS 90 02/26/2017    BILITOT 0.8 02/26/2017    CRP 46.0 (H) 02/27/2017     No results found for: HEPBIGM, HEPBCAB, HEPBSAB, HEPBSURFABQU  No results found for: VARICELLAZOS, VARICELLAINT  No results found for: NIL, TBAG, TBAGNIL, MITOGENNIL, TBGOLD  No results found for: TPMTRESULT  No results found for: ANSADAINIT, INFLIXIMAB, INFLIXINTERP    Assessment/Plan:  Pedro Nelson is a 41 y.o. male with past medical history of ileal and colonic Crohn's disease diagnosed 20 years ago. At the time of diagnosis he underwent bowel resection (no records of operation, pt is unaware of how much was resected).  Not on therapy currently, seems like he was on sulfasalazine in the past, he has had complication of fistulas as noted above, with future outpatient surgery planned by CRS, we are consulted to establish care for outpatient follow up.  Patient certainly would benefit from a biologic given his fistulizing ileocolonic crohn's diease, particularly remicade, he has not been on biologic therapy in the past.    Patient would like to follow with Dr. Cr in [place], closer to where he lives, and he wants to be discharged prior to having labs drawn for biologic therapy, below where our recommendations should he change his mind in the future and want to follow up with us.    - CRP 46    # fistulas in the setting of crohn's disease:   - acute management per CRS  - continue antibiotics per CRS  - outpatient labs and biologics per Dr. Cr in [place] closer to where patient livers     # high risk medication labs recommended to order:  (did not order labs today because patient did not want to wait to have labs drawn prior to dc)  - check VZV IgG, HBsAb   - check TTG IgA and IgA serum   - check quantiferon gold   - check hepatitis A IgG  - Vitamin D level    - histoplasma serum antigen     # Smoker:  - recommended to stop and come up with a plan prior to next visit  - discussed in detail that Crohn's disease can worsen with smoking and may make patient more resistant to treatment  - Recommend referral to a Smoking Cessation Program    Opthamologic exam recommended yearly      Dermatologic exam recommended yearly     Bone health:  Calcium 8126-4236 mg daily and vitamin D 800 IU daily    Vaccine counseling:  - No LIVE VACCINES   - VZV IgG, HBsAb today   - Tetanus every 10 years recommended  - Flu shot yearly recommended  - Pneumonia vaccine recommended  - Hepatitis A: (if risk factors- traveling to high endemic areas, chronic liver disease, clotting factor disorders, MSM, illicit drug users,       Rafael Coburn MD  Department of Gastroenterology

## 2017-02-27 NOTE — PROCEDURES
Incision and Drainage Procedure Note    Date of Procedure: 2/27/2017    Pre-operative Diagnosis: RLQ abscess/fistula with crohn's disease    Post-operative Diagnosis: same    Indications: as above    Anesthesia: 1% lidocaine with epinephrine    Procedure Details   The procedure, risks and complications have been discussed in detail (including, but not limited to airway compromise, infection, bleeding) with the patient, and the patient has signed consent to the procedure.    The skin was sterilely prepped and draped over the affected area in the usual fashion.  After adequate local anesthesia, I&D with a #11 blade was performed on the right lower quadrant/over previous mcburney incision. A 1.5x1.5cm incision made. Purulent drainage: present. A 16Fr Malecot drain was then easily passed into the cavity with return of purulence. Patient tolerated well.  The patient was observed until stable.    Findings:  Purulence and air in collection    EBL: 1 cc's    Drains: 16fr Malecot     Condition:  Stable    Complications:  none.

## 2017-02-27 NOTE — PLAN OF CARE
Pt lives in [place], [place] currently do to work.  His home is in [place], where his spouse resides.    Pt has upcoming appt with Dr Cr, GI physician in [place] on 3/6/17.    Pharmacy - [facility]   [facility]       02/27/17 0800   Discharge Assessment   Assessment Type Discharge Planning Assessment   Confirmed/corrected address and phone number on facesheet? Yes   Assessment information obtained from? Patient;Medical Record   Prior to hospitilization cognitive status: Alert/Oriented   Prior to hospitalization functional status: Independent   Current cognitive status: Alert/Oriented   Current Functional Status: Infant Toddler/Child Delayed   Arrived From home or self-care   Lives With alone   Able to Return to Prior Arrangements yes   Is patient able to care for self after discharge? Yes   How many people do you have in your home that can help with your care after discharge? 0   Who are your caregiver(s) and their phone number(s)? Wife, Ella Nelson  [phone]   Patient's perception of discharge disposition home or selfcare   Readmission Within The Last 30 Days no previous admission in last 30 days   Patient currently receives home health services? No   Does the patient currently use HME? No   Patient currently receives any other outside agency services? No   Equipment Currently Used at Home none   Do you have any problems affording any of your prescribed medications? No   Is the patient taking medications as prescribed? yes   Do you have any financial concerns preventing you from receiving the healthcare you need? No   On Dialysis? No   Does the patient receive services at the Coumadin Clinic? No   Discharge Plan A Home   Discharge Plan B Home with family   Patient/Family In Agreement With Plan yes

## 2017-02-27 NOTE — PLAN OF CARE
Pt anticipated to d/c at end of week with no needs.     Ella Moody, [facility] [phone]

## 2017-02-27 NOTE — PLAN OF CARE
Problem: Patient Care Overview  Goal: Plan of Care Review  Outcome: Ongoing (interventions implemented as appropriate)  The pt arrived on the 6 th floor at 0000 2/27/17.  The pt's breathing is regular equal and unlabored bilaterally on room air.  The pt arrived at the hospital with a old grenade located on lower right quadrant of ABD.  Just below the site the pr received a new drain in the ER that was to drainage in a ABD pt taped to pt's stomach.  The pt's pain was covered with IV and Oral pain medications. The pt can ambulate by self with no problems.  The pt is very thin and he did tell the nurse he has lost 10 lb of wt in the last two months unintentionally.

## 2017-02-27 NOTE — MEDICAL/APP STUDENT
"Gastroenterology Consult    Reason for Consult: Crohn's Disease    HPI: Mr Pedro Nelson is a 41 year old man with a past medical history of Crohn's disease diagnosed 20 years ago. At the time of diagnosis he underwent bowel resection (nil records of operation, pt is unaware of how much was resected). Pt claims to have been initially treated with "sulfa drugs" but ceased taking these 10 years ago. Since then he has not taken any immunosuppressants, and visits walk-in clinics for steroids whenever he has abdominal pain.    Pt reports his last colonoscopy was 10 years ago. He does not recall ever having undergone endoscopy.    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 A small abscess was also noted in the right anterior pelvic wall. Zosyn and flagyl was commenced with a drain placed by IR in the gluteal abscess on 2/7/17. This resolved pain. Pt attended follow-up appointments. Was switched to Augmentin on 02/15. Most recent appointment was 02/22. Drain O/P was 10ccs of murky fluid. Was advised to continue abx and follow up with local GI doctor in two weeks, for discussion of drain removal and long term immunosuppressive therapy.    Current presentation due to the development of swelling, erythema and tenderness of anterior right anterior pelvis. He [place] [facility] [place] and IR placed a small drain in his anterior abscess at bedside. Pt now reports improvement of his symptoms with Augmentin treatment continuing.        Constitutional: no fever, chills, unintentionally lost 10 lb of wt in the last two months   Eyes: no visual changes   ENT: no sore throat or dysphagia  Respiratory: no cough or shortness of breath   Cardiovascular: no chest pain or palpitations   Gastrointestinal: as per HPI  Hematologic/Lymphatic: no easy bruising or lymphadenopathy   Musculoskeletal: no " "arthralgias or myalgias   Neurological: no seizures, tremors or change in mental status  Behavioral/Psych: no auditory or visual hallucinations    History reviewed. No pertinent past medical history.    History reviewed. No pertinent surgical history.    History reviewed. No pertinent family history.    Review of patient's allergies indicates:  No Known Allergies    Social History     Social History    Marital status:      Spouse name: N/A    Number of children: N/A    Years of education: N/A     Social History Main Topics    Smoking status: Current Every Day Smoker     Packs/day: 0.25     Years: 10.00     Types: Cigarettes    Smokeless tobacco: None    Alcohol use No    Drug use: No    Sexual activity: Not Currently     Other Topics Concern    None     Social History Narrative        ciprofloxacin (CIPRO)400mg/200ml D5W IVPB  400 mg Intravenous Q12H    enoxaparin  40 mg Subcutaneous Daily    sodium chloride 0.9%  3 mL Intravenous Q8H       /62 (BP Location: Left arm, Patient Position: Sitting, BP Method: Automatic)  Pulse 81  Temp 98.7 °F (37.1 °C) (Oral)   Resp 16  Ht 6' 3" (1.905 m)  Wt 59 kg (130 lb)  SpO2 95%  BMI 16.25 kg/m2  General appearance: alert, appears stated age and cooperative.  Eyes: negative findings: conjunctivae and sclerae normal.  Throat: lips, mucosa, and tongue normal.  Lungs: clear to auscultation bilaterally.  Heart: S1, S2 normal.  Abdomen: soft, tender locally to site of new drain, local erythema around new drain; bowel sounds normal; no masses, no organomegaly, no distension   Skin: spider angiomata, no palmar erythema, no jaundice.  Lymph nodes: No cervical or supraclavicular adenopathy appreciated  Neurologic: Mental status: Alert, oriented, thought content appropriate, no asterixis.  Extremities: no lower extremity edema, no muscle wasting appreciated.      Laboratory:    Recent Labs  Lab 02/26/17  1529 02/27/17  0403    137   K 4.2 5.1    " 104   CO2 25 28   BUN 11 11   CREATININE 0.7 0.8   CALCIUM 9.3 9.1   PROT 8.1  --    BILITOT 0.8  --    ALKPHOS 90  --    ALT 8*  --    AST 8*  --          Recent Labs  Lab 02/26/17  1529 02/27/17  0403   WBC 12.16 12.37   HGB 11.0* 10.0*   HCT 35.2* 31.9*   * 341       Assessment:  Mr Pedro Nelson is a 41 year old man with a past medical history of Crohn's disease diagnosed 20 years ago who presents with abscess formation secondary to fistula development, s/p IR drain x2 in right abdominal wall.    Recommendations:  Continue augmentin   Pain control as needed  Keep current o/p appointment with local GI MD (Dr Cr) on the 03/06 to monitor drain progress. To consider eventually starting long term immunosuppressant therapy under Dr Cr.    Chan Grimm  Medical Student  [employer]

## 2017-02-27 NOTE — H&P
[facility]  History & Physical  Colorectal Surgery    SUBJECTIVE:     History of Present Illness:  Patient is a 41 y.o. male with Crohns disease s/p bowel resection ~20 years ago who was transferred to [facility] 2/7 with an IBD exacerbation with a fistula from his right colon and multiple loculated fluid collections. Patient underwent IR drainage of a gluteal collection but attempts to drain a right sided abdominal collection were not successful. Patient did well and was discharged with his drain and on antibiotics. He was doing well until a few days ago when he noticed swelling, erythema, and tenderness at the right abdominal wall site. No f/c.    Scheduled Meds:  Continuous Infusions:  PRN Meds:    Review of patient's allergies indicates:  No Known Allergies    History reviewed. No pertinent past medical history.  History reviewed. No pertinent surgical history.  History reviewed. No pertinent family history.  Social History   Substance Use Topics    Smoking status: Current Every Day Smoker     Packs/day: 0.25     Years: 10.00     Types: Cigarettes    Smokeless tobacco: None    Alcohol use No        Review of Systems:  Constitutional: no fever or chills, pain well controlled  Respiratory: no cough or shortness of breath  Cardiovascular: no chest pain or palpitations  Gastrointestinal:  no nausea or vomiting, tolerating regular diet  Neurological: no seizures or tremors    OBJECTIVE:     Vital Signs (Most Recent)  Temp: 98.6 °F (37 °C) (02/26/17 1755)  Pulse: 98 (02/26/17 1755)  Resp: 18 (02/26/17 1755)  BP: (!) 121/59 (02/26/17 1755)  SpO2: 97 % (02/26/17 1755)    Vital Signs Range (Last 24H):  Temp:  [98 °F (36.7 °C)-98.6 °F (37 °C)]   Pulse:  []   Resp:  [18]   BP: (121-129)/(59-70)   SpO2:  [97 %-100 %]     Physical Exam:  General: no distress  Eyes:  conjunctivae/corneas clear.  Lungs:  clear to auscultation bilaterally and normal respiratory effort  Heart: regular rate and rhythm, S1,  S2 normal, no murmur, rub or gallop  Abdomen: soft, nt, nd. +localized tender over well healed mcburney's incision with blanching erythema and crepitus  Drainage: purulent to IR drain. Site clean.    Laboratory:  CBC:   Recent Labs  Lab 02/26/17  1529   WBC 12.16   RBC 4.48*   HGB 11.0*   HCT 35.2*     Labs within the past 24 hours have been reviewed.    Diagnostic Results:  CT: Reviewed  Increase of RLQ fluid collection with possible fistula to right colon    ASSESSMENT/PLAN:     Pedro Nelson is a 41 y.o. male with CD s/p IR drainage of collection with increased right abdominal wall abscess / fistula    Admit to CRS  Continue augmentin   Pain control as needed  Discussed with Dr. Watson. Will place small drain at bedside    Hung Iyer     I have personally obtained a history and performed a physical exam with and independent to my resident and discussed the findings and plan with the patient.  I agree with the above findings and plan with the following exceptions:  None    H, Benjamin Upton MD, FACS, FASCRS  Staff Surgeon  Dept of Colon and Rectal Surgery  [facility] [place], [place]

## 2017-02-27 NOTE — CONSULTS
Consult Note  Colorectal Surgery    Consults  SUBJECTIVE:     History of Present Illness:  Patient is a 41 y.o. male with Crohns disease s/p bowel resection ~20 years ago who was transferred to [facility] 2/7 with an IBD exacerbation with a fistula from his right colon and multiple loculated fluid collections. Patient underwent IR drainage of a gluteal collection but attempts to drain a right sided abdominal collection were not successful. Patient did well and was discharged with his drain and on antibiotics. He was doing well until a few days ago when he noticed swelling, erythema, and tenderness at the right abdominal wall site. No f/c.    Scheduled Meds:  Continuous Infusions:  PRN Meds:    Review of patient's allergies indicates:  No Known Allergies    History reviewed. No pertinent past medical history.  History reviewed. No pertinent surgical history.  History reviewed. No pertinent family history.  Social History   Substance Use Topics    Smoking status: Current Every Day Smoker     Packs/day: 0.25     Years: 10.00     Types: Cigarettes    Smokeless tobacco: None    Alcohol use No        Review of Systems:  Constitutional: no fever or chills, pain well controlled  Respiratory: no cough or shortness of breath  Cardiovascular: no chest pain or palpitations  Gastrointestinal:  no nausea or vomiting, tolerating regular diet  Neurological: no seizures or tremors    OBJECTIVE:     Vital Signs (Most Recent)  Temp: 98.6 °F (37 °C) (02/26/17 1755)  Pulse: 98 (02/26/17 1755)  Resp: 18 (02/26/17 1755)  BP: (!) 121/59 (02/26/17 1755)  SpO2: 97 % (02/26/17 1755)    Vital Signs Range (Last 24H):  Temp:  [98 °F (36.7 °C)-98.6 °F (37 °C)]   Pulse:  []   Resp:  [18]   BP: (121-129)/(59-70)   SpO2:  [97 %-100 %]     Physical Exam:  General: no distress  Eyes:  conjunctivae/corneas clear.  Lungs:  clear to auscultation bilaterally and normal respiratory effort  Heart: regular rate and rhythm, S1, S2 normal, no murmur, rub or  gallop  Abdomen: soft, nt, nd. +localized tender over well healed mcburney's incision with blanching erythema and crepitus  Drainage: purulent to IR drain. Site clean.    Laboratory:  CBC:   Recent Labs  Lab 02/26/17  1529   WBC 12.16   RBC 4.48*   HGB 11.0*   HCT 35.2*     Labs within the past 24 hours have been reviewed.    Diagnostic Results:  CT: Reviewed  Increase of RLQ fluid collection with possible fistula to right colon    ASSESSMENT/PLAN:     Pedro Nelson is a 41 y.o. male with CD s/p IR drainage of collection with increased right abdominal wall abscess / fistula    Admit to CRS  Continue augmentin   Pain control as needed  Discussed with Dr. Watson. Will place small drain at bedside    Hung Iyer

## 2017-02-27 NOTE — PROGRESS NOTES
COLON RECTAL SURGERY  PROGRESS NOTE    LENGTH OF STAY: 0  POST OPERATIVE DAY:   for * No surgery found *     Subjective     Daily HPI: NO acute events, pain minimal after drainage at bedside, drain in place with bloody output.  No fevers.      Objective     Vitals:    02/27/17 1053   BP: 111/62   Pulse: 81   Resp: 16   Temp: 98.7 °F (37.1 °C)       No intake or output data in the 24 hours ending 02/27/17 1641    General:  male in nad  Neuro: AAO x4 MAEx4 PERRL  Chest: resps even unlabored, cap refill <2 sec  Abd: soft, nondistended, tenderness mild in the RLQ, without guarding and without rebound, R buttock drain with feculent drainage   Wound (if present): n/a  Stoma (if present): n/a    Recent Results (from the past 24 hour(s))   CBC with Auto Differential    Collection Time: 02/27/17  4:03 AM   Result Value Ref Range    WBC 12.37 3.90 - 12.70 K/uL    RBC 4.09 (L) 4.60 - 6.20 M/uL    Hemoglobin 10.0 (L) 14.0 - 18.0 g/dL    Hematocrit 31.9 (L) 40.0 - 54.0 %    MCV 78 (L) 82 - 98 fL    MCH 24.4 (L) 27.0 - 31.0 pg    MCHC 31.3 (L) 32.0 - 36.0 %    RDW 16.7 (H) 11.5 - 14.5 %    Platelets 341 150 - 350 K/uL    MPV 8.9 (L) 9.2 - 12.9 fL    Gran # 7.9 (H) 1.8 - 7.7 K/uL    Lymph # 2.3 1.0 - 4.8 K/uL    Mono # 2.0 (H) 0.3 - 1.0 K/uL    Eos # 0.1 0.0 - 0.5 K/uL    Baso # 0.03 0.00 - 0.20 K/uL    Gran% 63.7 38.0 - 73.0 %    Lymph% 18.7 18.0 - 48.0 %    Mono% 16.4 (H) 4.0 - 15.0 %    Eosinophil% 1.0 0.0 - 8.0 %    Basophil% 0.2 0.0 - 1.9 %    Platelet Estimate Appears normal     Aniso Slight     Poly Occasional     Differential Method Automated    Basic Metabolic Panel    Collection Time: 02/27/17  4:03 AM   Result Value Ref Range    Sodium 137 136 - 145 mmol/L    Potassium 5.1 3.5 - 5.1 mmol/L    Chloride 104 95 - 110 mmol/L    CO2 28 23 - 29 mmol/L    Glucose 93 70 - 110 mg/dL    BUN, Bld 11 6 - 20 mg/dL    Creatinine 0.8 0.5 - 1.4 mg/dL    Calcium 9.1 8.7 - 10.5 mg/dL    Anion Gap 5 (L) 8 - 16 mmol/L    eGFR if  African American >60.0 >60 mL/min/1.73 m^2    eGFR if non African American >60.0 >60 mL/min/1.73 m^2   Albumin    Collection Time: 02/27/17  4:03 AM   Result Value Ref Range    Albumin 2.9 (L) 3.5 - 5.2 g/dL   C-reactive protein    Collection Time: 02/27/17  4:03 AM   Result Value Ref Range    CRP 50.3 (H) 0.0 - 8.2 mg/L   Prealbumin    Collection Time: 02/27/17  7:43 AM   Result Value Ref Range    Prealbumin 17 (L) 20 - 43 mg/dL   C-reactive protein    Collection Time: 02/27/17  7:43 AM   Result Value Ref Range    CRP 46.0 (H) 0.0 - 8.2 mg/L   Albumin    Collection Time: 02/27/17  7:43 AM   Result Value Ref Range    Albumin 2.8 (L) 3.5 - 5.2 g/dL       Assessment and Plan     Pedro Nelson is a 41 y.o. male with CD s/p IR drainage of collection with increased right abdominal wall abscess / fistula     Admit to CRS  GI Consult today  ABX adjusted based on sensitivities   Pain control as needed  D/c tomorrow with abx and GI recs    Jamaal Plasencia MD  Colon and Rectal Surgery Fellow  [phone]

## 2017-02-28 PROCEDURE — 20600001 HC STEP DOWN PRIVATE ROOM

## 2017-02-28 NOTE — PROGRESS NOTES
Spoke with Dr. Jamaal Plasencia, patient is discharged; having computer issues to put in computer discharge. Verbal to discharge patient home. Patient given discharge instructions and prescriptions. Patient leaving floor via wheelchair and has a ride home. Instructed patient to call with any questions or concerns.

## 2017-02-28 NOTE — DISCHARGE SUMMARY
[facility]  Discharge Summary  Colorectal Surgery      Admit Date: 2/26/2017    Discharge Date and Time:  02/27/2017 6:18 PM     Attending Physician: OKSANA Upton MD     Discharge Provider: Jamaal Plasencia    Reason for Admission: abscess    Procedures Performed: * No surgery found *    Hospital Course (synopsis of major diagnoses, care, treatment, and services provided during the course of the hospital stay): Pt presented to [facility] for RLQ subcutaneous abscess that was drained at the bedside and drain was placed.  Pt was started on regular diet and tolerated it well.  Pain was controlled with PO analgesics.  Pt was able to ambulate and urinate without issue.  Pt stable for discharge.     Consults: GI    Significant Diagnostic Studies: Microbiology: Wound Culture: negative    Final Diagnoses:    Principal Problem: Crohn's disease of small intestine with abscess   Secondary Diagnoses:   Active Hospital Problems    Diagnosis  POA    *Crohn's disease of small intestine with abscess [K50.014]  Yes    Crohn's disease with abscess [K50.914]  Yes      Resolved Hospital Problems    Diagnosis Date Resolved POA   No resolved problems to display.       Discharged Condition: good    Disposition: Home or Self Care    Follow Up/Patient Instructions:     Medications:  Reconciled Home Medications:   Current Discharge Medication List      START taking these medications    Details   ciprofloxacin HCl (CIPRO) 500 MG tablet Take 1 tablet (500 mg total) by mouth every 12 (twelve) hours.  Qty: 14 tablet, Refills: 0         CONTINUE these medications which have NOT CHANGED    Details   oxycodone-acetaminophen (PERCOCET) 5-325 mg per tablet Take 1 tablet by mouth every 4 (four) hours as needed for Pain.  Qty: 60 tablet, Refills: 0         STOP taking these medications       amoxicillin-clavulanate 875-125mg (AUGMENTIN) 875-125 mg per tablet Comments:   Reason for Stopping:               Discharge Procedure  Orders  Diet general       Follow-up Information     Follow up with JONNA Upton MD In 2 weeks.    Specialty:  Colon and Rectal Surgery    Why:  For post op evaluation and care    Contact information:    [address]  [facility] [id number] [phone]          Follow up with Lorena Cr MD On 3/6/2017.    Specialty:  Internal Medicine    Contact information:    [address]  [address]  [facility] [address]  [phone]            Jamaal Plasencia MD  Colon and Rectal Surgery Fellow  [phone]

## 2017-03-08 ENCOUNTER — OFFICE VISIT (OUTPATIENT)
Dept: SURGERY | Facility: CLINIC | Age: 42
End: 2017-03-08
Payer: COMMERCIAL

## 2017-03-08 ENCOUNTER — OFFICE VISIT (OUTPATIENT)
Dept: WOUND CARE | Facility: CLINIC | Age: 42
End: 2017-03-08
Payer: COMMERCIAL

## 2017-03-08 VITALS
WEIGHT: 130.75 LBS | BODY MASS INDEX: 16.26 KG/M2 | HEIGHT: 75 IN | SYSTOLIC BLOOD PRESSURE: 109 MMHG | HEART RATE: 111 BPM | DIASTOLIC BLOOD PRESSURE: 76 MMHG

## 2017-03-08 DIAGNOSIS — L24.9 IRRITANT CONTACT DERMATITIS DUE TO ILEOSTOMY: ICD-10-CM

## 2017-03-08 DIAGNOSIS — K63.2 FISTULA OF INTESTINE TO ABDOMINAL WALL: Primary | ICD-10-CM

## 2017-03-08 DIAGNOSIS — K94.19 IRRITANT CONTACT DERMATITIS DUE TO ILEOSTOMY: ICD-10-CM

## 2017-03-08 DIAGNOSIS — K63.2 ENTEROCUTANEOUS FISTULA: Primary | ICD-10-CM

## 2017-03-08 PROCEDURE — 99024 POSTOP FOLLOW-UP VISIT: CPT | Mod: S$GLB,,, | Performed by: CLINICAL NURSE SPECIALIST

## 2017-03-08 PROCEDURE — 99999 PR PBB SHADOW E&M-EST. PATIENT-LVL III: CPT | Mod: PBBFAC,,, | Performed by: COLON & RECTAL SURGERY

## 2017-03-08 PROCEDURE — 99999 PR PBB SHADOW E&M-EST. PATIENT-LVL I: CPT | Mod: PBBFAC,,, | Performed by: CLINICAL NURSE SPECIALIST

## 2017-03-08 PROCEDURE — 1160F RVW MEDS BY RX/DR IN RCRD: CPT | Mod: S$GLB,,, | Performed by: COLON & RECTAL SURGERY

## 2017-03-08 PROCEDURE — 99213 OFFICE O/P EST LOW 20 MIN: CPT | Mod: S$GLB,,, | Performed by: COLON & RECTAL SURGERY

## 2017-03-08 RX ORDER — OXYCODONE AND ACETAMINOPHEN 5; 325 MG/1; MG/1
1 TABLET ORAL EVERY 4 HOURS PRN
Qty: 6 TABLET | Refills: 0 | Status: SHIPPED | OUTPATIENT
Start: 2017-03-08 | End: 2017-04-04

## 2017-03-08 NOTE — LETTER
March 10, 2017        Lorena Cr MD  [address]  [address]  [place] [address]             [facility]  [address]  [facility] [address]  Phone: [phone]   Patient: Pedro Nelson   MR Number: [id number]   YOB: 1975   Date of Visit: 3/8/2017       Dear Dr. 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  The anterior drain was removed and I have asked him to see our ET nurse for recommendations regarding local wound care.      I had asked him to see you for thoughts regarding medical therapy for his Crohn's disease.  He initially wanted any potential surgical care to occur back home in [place].  I was hopeful that you might be able to start him on biologics as means of treating his ileitis and potentially healing the fistula.  He is now considering proceeding with surgical resection which may not be unreasonable given the fistulae.      Let me know what you think.  If you have questions, please do not hesitate to call me [phone]    I look forward to following Pedro Nelson along with you.     Best regards, I am,     Sincerely,      HMeghan Upton MD            CC  No Recipients    Enclosure

## 2017-03-08 NOTE — PROGRESS NOTES
Seen at request Dr Upton, he removed the Malecot drain and the open area on right lower abd ( fistula) has scalded skin, not sure of how much if any drainage will be , so applied Cavilon and then a small pedi pouch over the opening to contain any drainage, pt observed how to place pch and extra supply given.

## 2017-03-10 NOTE — PROGRESS NOTES
Having drainage of stool around drain.  Not able to control drainage.  Otherwise feels well.  No N/V.  No fever.  Good appetite.      Exam  Anterior abdominal wound - drain removed.        Abdomen soft NT.  Wound in right lower quadrant as above photo demonstrates  Posterior, right flank drain with enteric material    Assessment  Recurrent ileal Crohn's disease complicated by enterocutaneous fistulae.      Recommend  Drain removed and local wound care initiated  Biologic therapy per Dr Cr.    OV 4 weeks

## 2017-04-04 ENCOUNTER — OFFICE VISIT (OUTPATIENT)
Dept: SURGERY | Facility: CLINIC | Age: 42
End: 2017-04-04
Payer: COMMERCIAL

## 2017-04-04 ENCOUNTER — OFFICE VISIT (OUTPATIENT)
Dept: WOUND CARE | Facility: CLINIC | Age: 42
End: 2017-04-04
Payer: COMMERCIAL

## 2017-04-04 VITALS
HEART RATE: 108 BPM | WEIGHT: 130.94 LBS | DIASTOLIC BLOOD PRESSURE: 75 MMHG | HEIGHT: 75 IN | SYSTOLIC BLOOD PRESSURE: 135 MMHG | BODY MASS INDEX: 16.28 KG/M2

## 2017-04-04 DIAGNOSIS — K94.19 IRRITANT CONTACT DERMATITIS DUE TO ILEOSTOMY: ICD-10-CM

## 2017-04-04 DIAGNOSIS — K50.014 CROHN'S DISEASE OF SMALL INTESTINE WITH ABSCESS: ICD-10-CM

## 2017-04-04 DIAGNOSIS — L24.9 IRRITANT CONTACT DERMATITIS DUE TO ILEOSTOMY: ICD-10-CM

## 2017-04-04 DIAGNOSIS — K63.2 FISTULA OF INTESTINE TO ABDOMINAL WALL: Primary | ICD-10-CM

## 2017-04-04 DIAGNOSIS — K63.2 ENTEROCUTANEOUS FISTULA: Primary | ICD-10-CM

## 2017-04-04 PROCEDURE — 99999 PR PBB SHADOW E&M-EST. PATIENT-LVL I: CPT | Mod: PBBFAC,,, | Performed by: CLINICAL NURSE SPECIALIST

## 2017-04-04 PROCEDURE — 99499 UNLISTED E&M SERVICE: CPT | Mod: S$GLB,,, | Performed by: CLINICAL NURSE SPECIALIST

## 2017-04-04 PROCEDURE — 1160F RVW MEDS BY RX/DR IN RCRD: CPT | Mod: S$GLB,,, | Performed by: COLON & RECTAL SURGERY

## 2017-04-04 PROCEDURE — 99999 PR PBB SHADOW E&M-EST. PATIENT-LVL II: CPT | Mod: PBBFAC,,, | Performed by: COLON & RECTAL SURGERY

## 2017-04-04 PROCEDURE — 99213 OFFICE O/P EST LOW 20 MIN: CPT | Mod: S$GLB,,, | Performed by: COLON & RECTAL SURGERY

## 2017-04-04 NOTE — PROGRESS NOTES
Pt states the pouch did help and just when he was going to try to get them, he notes the drainage seemed to decrease and so he decided to stay with using guaze and tape to secure,  Discussed skin protection with  Cavilon or NEXCARE spray  Told to call me if he wants to acquire pouches

## 2017-04-04 NOTE — LETTER
April 10, 2017        Lorena Cr MD  [address]  [address]  [place] [address]             [facility]  [address]  [facility] [address]  Phone: [phone]   Patient: Pedro Nelson   MR Number: [id number]   YOB: 1975   Date of Visit: 4/4/2017       Dear Dr. Cr: (Lorena)    Pedro Nelson was seen in follow up of his Crohn's ileitis complicated by abscess and fistula formation.  He is tolerating a diet.  Minimal d/c from drain and fistula site.  I removed the drain today.  He will return to you for medical treatment of his Crohn's disease.  I did discuss with him the potential role of surgical resection and will be available should you feel necessary.      If you have questions, please do not hesitate to call me. I look forward to following Pedro Nelson along with you.    I hope you are enjoying your practice!    Best Regards,       OKSANA Upton MD            CC  No Recipients    Enclosure

## 2017-04-10 PROBLEM — K63.2 ENTEROCUTANEOUS FISTULA: Status: ACTIVE | Noted: 2017-04-10

## 2017-04-10 NOTE — PROGRESS NOTES
"Feels well.  Minimal d/c from drain.  No N/V.  Occasional pain in abdomen.  Tolerating diet    Appears well  /75  Pulse 108  Ht 6' 3" (1.905 m)  Wt 59.4 kg (130 lb 15.3 oz)  BMI 16.37 kg/m2  abd soft NT ND  Drain site with feculent d/c    Assessment  Crohn's disease of ileum complicated by ECF.  No signs of sepsis  Partial SBO secondary to above - improved    Recommend  Discussed with pt the role of surgery  Dr Cr to attempt biologic therapy  RTC 6 weeks  "

## 2017-05-10 ENCOUNTER — OFFICE VISIT (OUTPATIENT)
Dept: SURGERY | Facility: CLINIC | Age: 42
End: 2017-05-10
Payer: COMMERCIAL

## 2017-05-10 VITALS
HEIGHT: 75 IN | BODY MASS INDEX: 16.5 KG/M2 | WEIGHT: 132.69 LBS | HEART RATE: 87 BPM | DIASTOLIC BLOOD PRESSURE: 67 MMHG | SYSTOLIC BLOOD PRESSURE: 109 MMHG

## 2017-05-10 DIAGNOSIS — K50.014 CROHN'S DISEASE OF SMALL INTESTINE WITH ABSCESS: ICD-10-CM

## 2017-05-10 DIAGNOSIS — K63.2 ENTEROCUTANEOUS FISTULA: Primary | ICD-10-CM

## 2017-05-10 DIAGNOSIS — Z01.818 PRE-OP EVALUATION: Primary | ICD-10-CM

## 2017-05-10 PROCEDURE — 99999 PR PBB SHADOW E&M-EST. PATIENT-LVL II: CPT | Mod: PBBFAC,,, | Performed by: COLON & RECTAL SURGERY

## 2017-05-10 PROCEDURE — 99214 OFFICE O/P EST MOD 30 MIN: CPT | Mod: S$GLB,,, | Performed by: COLON & RECTAL SURGERY

## 2017-05-10 PROCEDURE — 1160F RVW MEDS BY RX/DR IN RCRD: CPT | Mod: S$GLB,,, | Performed by: COLON & RECTAL SURGERY

## 2017-05-10 NOTE — PROGRESS NOTES
History & Physical    SUBJECTIVE:     Chief Complaint: Enterocutaneous fistula     History of Present Illness:  Patient is a 41 y.o. male  With a long history of penetrating Crohn's disase presents for preoperative evaluation. 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 Patient was admitted to the CRS service with subcutaneus abscesses treated with IR drainage in FEB2017 and CT scan scan findings concerning for fistula formation from the terminal ileum.  He was discharged with the drains however developed an enterocutaneous fistula. He was sent to a gastroenterologist with plans to start biologic therapy however patient underwent a colonscopy by the GI physician and given the large amount of discharge from the fistula, the recommendation to proceed with surgery to remove the fistula prior to starting the therapy.  He is here to schedule the procedure.      Review of patient's allergies indicates:  No Known Allergies    No past medical history on file.  No past surgical history on file.  No family history on file.  Social History   Substance Use Topics    Smoking status: Current Every Day Smoker     Packs/day: 0.25     Years: 10.00     Types: Cigarettes    Smokeless tobacco: None    Alcohol use No        Review of Systems:  Constitutional: no fever or chills  Eyes: no visual changes  ENT: no nasal congestion or sore throat  Respiratory: no cough or shortness of breath  Cardiovascular: no chest pain or palpitations  Gastrointestinal: no nausea or vomiting, tolerating diet    OBJECTIVE:     Vital Signs (Most Recent)  Pulse: 87 (05/10/17 1401)  BP: 109/67 (05/10/17 1401)    Physical Exam:  General: White male in NAD sitting in chair in clinic  Neuro: aaox4 maex4 perrl  Respiratory: resps even unlabored  Cardiac: cap refill <2 sec  Abdomen: RLQ external opening with succus noted on guaze   Extremities: Warm dry and intact  Anorectal:  deferred    Laboratory  CBC: pending  BMP: pending     Diagnostic Results:  CT: Reviewed   CSP images reviewed, report pending     ASSESSMENT/PLAN:     Plan for ileocolectomy on 26MAY2017   Patient will undergo CT enterography tomorrow  Labs to be drawn prior to procedure    Admission paperwork was accomplished including operative consent and consent for blood and blood product administration.    The procedure was explained to the patient including indications, risks and alternatives. The patient verbalized understanding and has given informed consent.    Jamaal Plasencia MD  Colon and Rectal Surgery Fellow  [phone]    I have personally obtained a history and performed a physical exam with and independent to my resident and discussed the findings and plan with the patient.  I agree with the above findings and plan with the following exceptions:  None    H, Benjamin Uptno MD, FACS, FASCRS  Staff Surgeon  Dept of Colon and Rectal Surgery  [facility] [place], [place]

## 2017-05-10 NOTE — LETTER
May 17, 2017        Lorena Cr MD  [address]  [address]  [place] [address]             [facility]  [address]  [facility] [address]  Phone: [phone]   Patient: Pedro Nelson   MR Number: [id number]   YOB: 1975   Date of Visit: 5/10/2017       Dear Dr. Cr:    I hope you are well and enjoying your practice. Pedro Nelson was seen in follow up of his enterocutaneous fistulae and history of recurrent Crohn's disease of the ileum, penetrating phenotype.    Attached you will find relevant portions of my assessment and plan of care.  In summary, I have recommended that we proceed with surgical resection with probable anastomosis.      If you have questions, please do not hesitate to call me. I look forward to following Pedro eNlson along with you.    Sincerely,      OKSANA Upton MD            CC  No Recipients    Enclosure

## 2017-05-11 ENCOUNTER — TELEPHONE (OUTPATIENT)
Dept: SURGERY | Facility: CLINIC | Age: 42
End: 2017-05-11

## 2017-05-11 DIAGNOSIS — K50.014 CROHN'S DISEASE OF SMALL INTESTINE WITH ABSCESS: Primary | ICD-10-CM

## 2017-05-11 DIAGNOSIS — K63.2 ENTEROCUTANEOUS FISTULA: ICD-10-CM

## 2017-05-11 NOTE — TELEPHONE ENCOUNTER
Left message for pt to come and sign a release for his medical records from Dr Cr. Yesterday hte office was going to fax the c/scope and path but today I get transferred to [facility] and the recording says to fax a signed release.

## 2017-05-19 ENCOUNTER — LAB VISIT (OUTPATIENT)
Dept: LAB | Facility: HOSPITAL | Age: 42
End: 2017-05-19
Attending: COLON & RECTAL SURGERY
Payer: COMMERCIAL

## 2017-05-19 DIAGNOSIS — Z01.818 PRE-OP EVALUATION: ICD-10-CM

## 2017-05-19 LAB
ALBUMIN SERPL BCP-MCNC: 3.1 G/DL
ALP SERPL-CCNC: 89 U/L
ALT SERPL W/O P-5'-P-CCNC: 5 U/L
ANION GAP SERPL CALC-SCNC: 8 MMOL/L
AST SERPL-CCNC: 9 U/L
BASOPHILS # BLD AUTO: 0.03 K/UL
BASOPHILS NFR BLD: 0.3 %
BILIRUB SERPL-MCNC: 0.5 MG/DL
BUN SERPL-MCNC: 9 MG/DL
CALCIUM SERPL-MCNC: 9.6 MG/DL
CHLORIDE SERPL-SCNC: 98 MMOL/L
CO2 SERPL-SCNC: 30 MMOL/L
CREAT SERPL-MCNC: 0.8 MG/DL
CRP SERPL-MCNC: 63.7 MG/L
DIFFERENTIAL METHOD: ABNORMAL
EOSINOPHIL # BLD AUTO: 0.1 K/UL
EOSINOPHIL NFR BLD: 0.9 %
ERYTHROCYTE [DISTWIDTH] IN BLOOD BY AUTOMATED COUNT: 16.3 %
EST. GFR  (AFRICAN AMERICAN): >60 ML/MIN/1.73 M^2
EST. GFR  (NON AFRICAN AMERICAN): >60 ML/MIN/1.73 M^2
GLUCOSE SERPL-MCNC: 113 MG/DL
HCT VFR BLD AUTO: 34.7 %
HGB BLD-MCNC: 10.9 G/DL
LYMPHOCYTES # BLD AUTO: 2 K/UL
LYMPHOCYTES NFR BLD: 16.8 %
MCH RBC QN AUTO: 24.8 PG
MCHC RBC AUTO-ENTMCNC: 31.4 %
MCV RBC AUTO: 79 FL
MONOCYTES # BLD AUTO: 1.1 K/UL
MONOCYTES NFR BLD: 9.3 %
NEUTROPHILS # BLD AUTO: 8.6 K/UL
NEUTROPHILS NFR BLD: 72.4 %
PLATELET # BLD AUTO: 420 K/UL
PMV BLD AUTO: 8.6 FL
POTASSIUM SERPL-SCNC: 5 MMOL/L
PREALB SERPL-MCNC: 16 MG/DL
PROT SERPL-MCNC: 8 G/DL
RBC # BLD AUTO: 4.39 M/UL
SODIUM SERPL-SCNC: 136 MMOL/L
WBC # BLD AUTO: 11.88 K/UL

## 2017-05-19 PROCEDURE — 80053 COMPREHEN METABOLIC PANEL: CPT

## 2017-05-19 PROCEDURE — 36415 COLL VENOUS BLD VENIPUNCTURE: CPT

## 2017-05-19 PROCEDURE — 86140 C-REACTIVE PROTEIN: CPT

## 2017-05-19 PROCEDURE — 84134 ASSAY OF PREALBUMIN: CPT

## 2017-05-19 PROCEDURE — 85025 COMPLETE CBC W/AUTO DIFF WBC: CPT

## 2017-05-23 RX ORDER — NEOMYCIN SULFATE 500 MG/1
TABLET ORAL
Qty: 6 TABLET | Refills: 0 | Status: SHIPPED | OUTPATIENT
Start: 2017-05-23 | End: 2018-01-10

## 2017-05-23 RX ORDER — METRONIDAZOLE 500 MG/1
TABLET ORAL
Qty: 3 TABLET | Refills: 0 | Status: SHIPPED | OUTPATIENT
Start: 2017-05-23 | End: 2018-01-10

## 2017-05-23 NOTE — TELEPHONE ENCOUNTER
----- Message from Wilbert Rangel sent at 5/23/2017  9:35 AM CDT -----  Contact: Pt:[phone]  Pt is calling back and states it's very important for him to have his (flagyl and neomycin) filled because he is having surgery.   ----- Message -----  From: Wilbert Rangel  Sent: 5/22/2017   3:56 PM  To: Won Alexander Staff    Pt called and states he was unaware that he has to take Flagyl and neomycin. Pt states he would like to know if the Rx could be sent to his local pharmacy.

## 2017-05-23 NOTE — TELEPHONE ENCOUNTER
----- Message from Wilbert Rangel sent at 5/22/2017  3:56 PM CDT -----  Contact: Pt:[phone]  Pt called and states he was unaware that he has to take Flagyl and neomycin. Pt states he would like to know if the Rx could be sent to his local pharmacy.

## 2017-05-24 NOTE — TELEPHONE ENCOUNTER
----- Message from Jenelle Mercado sent at 5/24/2017  8:14 AM CDT -----  Contact: Pt# [phone]  Pt states he was calling to speak to the nurse about his pain medication and would like a phone call back

## 2017-05-25 ENCOUNTER — ANESTHESIA EVENT (OUTPATIENT)
Dept: SURGERY | Facility: HOSPITAL | Age: 42
DRG: 330 | End: 2017-05-25
Payer: COMMERCIAL

## 2017-05-26 ENCOUNTER — ANESTHESIA (OUTPATIENT)
Dept: SURGERY | Facility: HOSPITAL | Age: 42
DRG: 330 | End: 2017-05-26
Payer: COMMERCIAL

## 2017-05-26 ENCOUNTER — HOSPITAL ENCOUNTER (INPATIENT)
Facility: HOSPITAL | Age: 42
LOS: 8 days | Discharge: HOME HEALTH | DRG: 330 | End: 2017-06-03
Attending: COLON & RECTAL SURGERY | Admitting: COLON & RECTAL SURGERY
Payer: COMMERCIAL

## 2017-05-26 DIAGNOSIS — K50.013 CROHN'S DISEASE OF ILEUM WITH FISTULA: ICD-10-CM

## 2017-05-26 DIAGNOSIS — K51.90 ULCERATIVE COLITIS: ICD-10-CM

## 2017-05-26 DIAGNOSIS — K50.014 CROHN'S DISEASE OF SMALL INTESTINE WITH ABSCESS: Primary | ICD-10-CM

## 2017-05-26 PROCEDURE — 20600001 HC STEP DOWN PRIVATE ROOM

## 2017-05-26 PROCEDURE — 44640 REPAIR BOWEL-SKIN FISTULA: CPT | Mod: 51,,, | Performed by: COLON & RECTAL SURGERY

## 2017-05-26 PROCEDURE — C1758 CATHETER, URETERAL: HCPCS | Performed by: COLON & RECTAL SURGERY

## 2017-05-26 PROCEDURE — 88307 TISSUE EXAM BY PATHOLOGIST: CPT | Mod: 26,,, | Performed by: PATHOLOGY

## 2017-05-26 PROCEDURE — 63600175 PHARM REV CODE 636 W HCPCS: Performed by: ANESTHESIOLOGY

## 2017-05-26 PROCEDURE — 0DTK0ZZ RESECTION OF ASCENDING COLON, OPEN APPROACH: ICD-10-PCS | Performed by: COLON & RECTAL SURGERY

## 2017-05-26 PROCEDURE — 25000003 PHARM REV CODE 250: Performed by: SURGERY

## 2017-05-26 PROCEDURE — D9220A PRA ANESTHESIA: Mod: CRNA,,, | Performed by: NURSE ANESTHETIST, CERTIFIED REGISTERED

## 2017-05-26 PROCEDURE — 0WUF07Z SUPPLEMENT ABDOMINAL WALL WITH AUTOLOGOUS TISSUE SUBSTITUTE, OPEN APPROACH: ICD-10-PCS | Performed by: COLON & RECTAL SURGERY

## 2017-05-26 PROCEDURE — 25000003 PHARM REV CODE 250: Performed by: NURSE ANESTHETIST, CERTIFIED REGISTERED

## 2017-05-26 PROCEDURE — 63600175 PHARM REV CODE 636 W HCPCS: Performed by: COLON & RECTAL SURGERY

## 2017-05-26 PROCEDURE — 63600175 PHARM REV CODE 636 W HCPCS: Performed by: SURGERY

## 2017-05-26 PROCEDURE — 44650 REPAIR BOWEL FISTULA: CPT | Mod: 22,,, | Performed by: COLON & RECTAL SURGERY

## 2017-05-26 PROCEDURE — 63600175 PHARM REV CODE 636 W HCPCS: Performed by: NURSE ANESTHETIST, CERTIFIED REGISTERED

## 2017-05-26 PROCEDURE — 94760 N-INVAS EAR/PLS OXIMETRY 1: CPT

## 2017-05-26 PROCEDURE — 49905 OMENTAL FLAP INTRA-ABDOM: CPT | Mod: ,,, | Performed by: COLON & RECTAL SURGERY

## 2017-05-26 PROCEDURE — 71000039 HC RECOVERY, EACH ADD'L HOUR: Performed by: COLON & RECTAL SURGERY

## 2017-05-26 PROCEDURE — 25000003 PHARM REV CODE 250: Performed by: NURSE PRACTITIONER

## 2017-05-26 PROCEDURE — 88307 TISSUE EXAM BY PATHOLOGIST: CPT | Performed by: PATHOLOGY

## 2017-05-26 PROCEDURE — D9220A PRA ANESTHESIA: Mod: ANES,,, | Performed by: ANESTHESIOLOGY

## 2017-05-26 PROCEDURE — 27201423 OPTIME MED/SURG SUP & DEVICES STERILE SUPPLY: Performed by: COLON & RECTAL SURGERY

## 2017-05-26 PROCEDURE — 52005 CYSTO W/URTRL CATHJ: CPT | Mod: ,,, | Performed by: UROLOGY

## 2017-05-26 PROCEDURE — 63600175 PHARM REV CODE 636 W HCPCS: Performed by: NURSE PRACTITIONER

## 2017-05-26 PROCEDURE — 37000009 HC ANESTHESIA EA ADD 15 MINS: Performed by: COLON & RECTAL SURGERY

## 2017-05-26 PROCEDURE — 36000708 HC OR TIME LEV III 1ST 15 MIN: Performed by: COLON & RECTAL SURGERY

## 2017-05-26 PROCEDURE — 25000003 PHARM REV CODE 250: Performed by: COLON & RECTAL SURGERY

## 2017-05-26 PROCEDURE — 0DN80ZZ RELEASE SMALL INTESTINE, OPEN APPROACH: ICD-10-PCS | Performed by: COLON & RECTAL SURGERY

## 2017-05-26 PROCEDURE — 0T788DZ DILATION OF BILATERAL URETERS WITH INTRALUMINAL DEVICE, VIA NATURAL OR ARTIFICIAL OPENING ENDOSCOPIC: ICD-10-PCS | Performed by: UROLOGY

## 2017-05-26 PROCEDURE — P9045 ALBUMIN (HUMAN), 5%, 250 ML: HCPCS | Performed by: NURSE ANESTHETIST, CERTIFIED REGISTERED

## 2017-05-26 PROCEDURE — 71000033 HC RECOVERY, INTIAL HOUR: Performed by: COLON & RECTAL SURGERY

## 2017-05-26 PROCEDURE — 94799 UNLISTED PULMONARY SVC/PX: CPT

## 2017-05-26 PROCEDURE — 37000008 HC ANESTHESIA 1ST 15 MINUTES: Performed by: COLON & RECTAL SURGERY

## 2017-05-26 PROCEDURE — 36000709 HC OR TIME LEV III EA ADD 15 MIN: Performed by: COLON & RECTAL SURGERY

## 2017-05-26 PROCEDURE — 0DBB0ZZ EXCISION OF ILEUM, OPEN APPROACH: ICD-10-PCS | Performed by: COLON & RECTAL SURGERY

## 2017-05-26 PROCEDURE — 27000221 HC OXYGEN, UP TO 24 HOURS

## 2017-05-26 RX ORDER — ACETAMINOPHEN 10 MG/ML
1000 INJECTION, SOLUTION INTRAVENOUS EVERY 8 HOURS
Status: DISPENSED | OUTPATIENT
Start: 2017-05-26 | End: 2017-05-27

## 2017-05-26 RX ORDER — NEOSTIGMINE METHYLSULFATE 1 MG/ML
INJECTION, SOLUTION INTRAVENOUS
Status: DISCONTINUED | OUTPATIENT
Start: 2017-05-26 | End: 2017-05-26

## 2017-05-26 RX ORDER — FENTANYL CITRATE 50 UG/ML
25 INJECTION, SOLUTION INTRAMUSCULAR; INTRAVENOUS EVERY 5 MIN PRN
Status: COMPLETED | OUTPATIENT
Start: 2017-05-26 | End: 2017-05-26

## 2017-05-26 RX ORDER — SODIUM CHLORIDE 9 MG/ML
INJECTION, SOLUTION INTRAVENOUS CONTINUOUS
Status: DISCONTINUED | OUTPATIENT
Start: 2017-05-26 | End: 2017-05-26

## 2017-05-26 RX ORDER — CEFAZOLIN SODIUM 2 G/50ML
2 SOLUTION INTRAVENOUS
Status: COMPLETED | OUTPATIENT
Start: 2017-05-26 | End: 2017-05-26

## 2017-05-26 RX ORDER — HEPARIN SODIUM 5000 [USP'U]/ML
5000 INJECTION, SOLUTION INTRAVENOUS; SUBCUTANEOUS
Status: COMPLETED | OUTPATIENT
Start: 2017-05-26 | End: 2017-05-26

## 2017-05-26 RX ORDER — BACITRACIN 50000 [IU]/1
INJECTION, POWDER, FOR SOLUTION INTRAMUSCULAR
Status: DISCONTINUED | OUTPATIENT
Start: 2017-05-26 | End: 2017-05-26 | Stop reason: HOSPADM

## 2017-05-26 RX ORDER — ALBUMIN HUMAN 50 G/1000ML
SOLUTION INTRAVENOUS CONTINUOUS PRN
Status: DISCONTINUED | OUTPATIENT
Start: 2017-05-26 | End: 2017-05-26

## 2017-05-26 RX ORDER — ALVIMOPAN 12 MG/1
12 CAPSULE ORAL 2 TIMES DAILY
Status: COMPLETED | OUTPATIENT
Start: 2017-05-26 | End: 2017-06-02

## 2017-05-26 RX ORDER — NALOXONE HCL 0.4 MG/ML
0.02 VIAL (ML) INJECTION
Status: DISCONTINUED | OUTPATIENT
Start: 2017-05-26 | End: 2017-06-03 | Stop reason: HOSPADM

## 2017-05-26 RX ORDER — SODIUM CHLORIDE 0.9 % (FLUSH) 0.9 %
3 SYRINGE (ML) INJECTION
Status: DISCONTINUED | OUTPATIENT
Start: 2017-05-26 | End: 2017-05-26 | Stop reason: HOSPADM

## 2017-05-26 RX ORDER — HYDROMORPHONE HYDROCHLORIDE 1 MG/ML
0.2 INJECTION, SOLUTION INTRAMUSCULAR; INTRAVENOUS; SUBCUTANEOUS EVERY 5 MIN PRN
Status: DISCONTINUED | OUTPATIENT
Start: 2017-05-26 | End: 2017-05-26 | Stop reason: HOSPADM

## 2017-05-26 RX ORDER — OXYCODONE HYDROCHLORIDE 5 MG/1
5 TABLET ORAL
Status: DISCONTINUED | OUTPATIENT
Start: 2017-05-26 | End: 2017-05-26 | Stop reason: HOSPADM

## 2017-05-26 RX ORDER — LIDOCAINE HCL/PF 100 MG/5ML
SYRINGE (ML) INTRAVENOUS
Status: DISCONTINUED | OUTPATIENT
Start: 2017-05-26 | End: 2017-05-26

## 2017-05-26 RX ORDER — PHENYLEPHRINE HYDROCHLORIDE 10 MG/ML
INJECTION INTRAVENOUS
Status: DISCONTINUED | OUTPATIENT
Start: 2017-05-26 | End: 2017-05-26

## 2017-05-26 RX ORDER — KETAMINE HCL IN 0.9 % NACL 50 MG/5 ML
SYRINGE (ML) INTRAVENOUS
Status: DISCONTINUED | OUTPATIENT
Start: 2017-05-26 | End: 2017-05-26

## 2017-05-26 RX ORDER — ONDANSETRON 2 MG/ML
INJECTION INTRAMUSCULAR; INTRAVENOUS
Status: DISCONTINUED | OUTPATIENT
Start: 2017-05-26 | End: 2017-05-26

## 2017-05-26 RX ORDER — PROPOFOL 10 MG/ML
VIAL (ML) INTRAVENOUS
Status: DISCONTINUED | OUTPATIENT
Start: 2017-05-26 | End: 2017-05-26

## 2017-05-26 RX ORDER — LIDOCAINE HYDROCHLORIDE 10 MG/ML
1 INJECTION, SOLUTION EPIDURAL; INFILTRATION; INTRACAUDAL; PERINEURAL ONCE
Status: COMPLETED | OUTPATIENT
Start: 2017-05-26 | End: 2017-05-26

## 2017-05-26 RX ORDER — FENTANYL CITRATE 50 UG/ML
INJECTION, SOLUTION INTRAMUSCULAR; INTRAVENOUS
Status: DISCONTINUED | OUTPATIENT
Start: 2017-05-26 | End: 2017-05-26

## 2017-05-26 RX ORDER — BUPIVACAINE HYDROCHLORIDE 2.5 MG/ML
INJECTION, SOLUTION EPIDURAL; INFILTRATION; INTRACAUDAL
Status: DISCONTINUED | OUTPATIENT
Start: 2017-05-26 | End: 2017-05-26 | Stop reason: HOSPADM

## 2017-05-26 RX ORDER — ROCURONIUM BROMIDE 10 MG/ML
INJECTION, SOLUTION INTRAVENOUS
Status: DISCONTINUED | OUTPATIENT
Start: 2017-05-26 | End: 2017-05-26

## 2017-05-26 RX ORDER — HYDROCODONE BITARTRATE AND ACETAMINOPHEN 5; 325 MG/1; MG/1
1 TABLET ORAL EVERY 4 HOURS PRN
Status: DISCONTINUED | OUTPATIENT
Start: 2017-05-26 | End: 2017-06-03 | Stop reason: HOSPADM

## 2017-05-26 RX ORDER — METRONIDAZOLE 500 MG/100ML
500 INJECTION, SOLUTION INTRAVENOUS
Status: COMPLETED | OUTPATIENT
Start: 2017-05-26 | End: 2017-05-26

## 2017-05-26 RX ORDER — MIDAZOLAM HYDROCHLORIDE 1 MG/ML
INJECTION, SOLUTION INTRAMUSCULAR; INTRAVENOUS
Status: DISCONTINUED | OUTPATIENT
Start: 2017-05-26 | End: 2017-05-26

## 2017-05-26 RX ORDER — ONDANSETRON 2 MG/ML
4 INJECTION INTRAMUSCULAR; INTRAVENOUS DAILY PRN
Status: DISCONTINUED | OUTPATIENT
Start: 2017-05-26 | End: 2017-05-26 | Stop reason: HOSPADM

## 2017-05-26 RX ORDER — ACETAMINOPHEN 10 MG/ML
1000 INJECTION, SOLUTION INTRAVENOUS
Status: COMPLETED | OUTPATIENT
Start: 2017-05-26 | End: 2017-05-26

## 2017-05-26 RX ORDER — ACETAMINOPHEN 10 MG/ML
INJECTION, SOLUTION INTRAVENOUS
Status: DISCONTINUED | OUTPATIENT
Start: 2017-05-26 | End: 2017-05-26

## 2017-05-26 RX ORDER — ONDANSETRON 2 MG/ML
4 INJECTION INTRAMUSCULAR; INTRAVENOUS EVERY 12 HOURS PRN
Status: DISCONTINUED | OUTPATIENT
Start: 2017-05-26 | End: 2017-05-27

## 2017-05-26 RX ORDER — SODIUM CHLORIDE, SODIUM LACTATE, POTASSIUM CHLORIDE, CALCIUM CHLORIDE 600; 310; 30; 20 MG/100ML; MG/100ML; MG/100ML; MG/100ML
INJECTION, SOLUTION INTRAVENOUS CONTINUOUS
Status: DISCONTINUED | OUTPATIENT
Start: 2017-05-26 | End: 2017-06-03 | Stop reason: HOSPADM

## 2017-05-26 RX ORDER — HYDROMORPHONE HYDROCHLORIDE 1 MG/ML
1 INJECTION, SOLUTION INTRAMUSCULAR; INTRAVENOUS; SUBCUTANEOUS EVERY 4 HOURS PRN
Status: DISCONTINUED | OUTPATIENT
Start: 2017-05-26 | End: 2017-05-27

## 2017-05-26 RX ORDER — TRAMADOL HYDROCHLORIDE 50 MG/1
50 TABLET ORAL EVERY 6 HOURS PRN
Status: ON HOLD | COMMUNITY
End: 2018-01-28 | Stop reason: HOSPADM

## 2017-05-26 RX ORDER — GLYCOPYRROLATE 0.2 MG/ML
INJECTION INTRAMUSCULAR; INTRAVENOUS
Status: DISCONTINUED | OUTPATIENT
Start: 2017-05-26 | End: 2017-05-26

## 2017-05-26 RX ADMIN — SODIUM CHLORIDE, SODIUM LACTATE, POTASSIUM CHLORIDE, AND CALCIUM CHLORIDE: .6; .31; .03; .02 INJECTION, SOLUTION INTRAVENOUS at 12:05

## 2017-05-26 RX ADMIN — HEPARIN SODIUM 5000 UNITS: 5000 INJECTION, SOLUTION INTRAVENOUS; SUBCUTANEOUS at 06:05

## 2017-05-26 RX ADMIN — FENTANYL CITRATE 50 MCG: 50 INJECTION, SOLUTION INTRAMUSCULAR; INTRAVENOUS at 08:05

## 2017-05-26 RX ADMIN — SODIUM CHLORIDE, SODIUM GLUCONATE, SODIUM ACETATE, POTASSIUM CHLORIDE, MAGNESIUM CHLORIDE, SODIUM PHOSPHATE, DIBASIC, AND POTASSIUM PHOSPHATE: .53; .5; .37; .037; .03; .012; .00082 INJECTION, SOLUTION INTRAVENOUS at 07:05

## 2017-05-26 RX ADMIN — NEOSTIGMINE METHYLSULFATE 5 MG: 1 INJECTION INTRAVENOUS at 11:05

## 2017-05-26 RX ADMIN — FENTANYL CITRATE 50 MCG: 50 INJECTION, SOLUTION INTRAMUSCULAR; INTRAVENOUS at 07:05

## 2017-05-26 RX ADMIN — METRONIDAZOLE 500 MG: 500 SOLUTION INTRAVENOUS at 07:05

## 2017-05-26 RX ADMIN — FENTANYL CITRATE 25 MCG: 50 INJECTION INTRAMUSCULAR; INTRAVENOUS at 12:05

## 2017-05-26 RX ADMIN — ROCURONIUM BROMIDE 40 MG: 10 INJECTION, SOLUTION INTRAVENOUS at 07:05

## 2017-05-26 RX ADMIN — SODIUM CHLORIDE, SODIUM GLUCONATE, SODIUM ACETATE, POTASSIUM CHLORIDE, MAGNESIUM CHLORIDE, SODIUM PHOSPHATE, DIBASIC, AND POTASSIUM PHOSPHATE: .53; .5; .37; .037; .03; .012; .00082 INJECTION, SOLUTION INTRAVENOUS at 08:05

## 2017-05-26 RX ADMIN — IBUPROFEN 800 MG: 800 INJECTION INTRAVENOUS at 10:05

## 2017-05-26 RX ADMIN — PHENYLEPHRINE HYDROCHLORIDE 300 MCG: 10 INJECTION INTRAVENOUS at 08:05

## 2017-05-26 RX ADMIN — ALBUMIN (HUMAN): 12.5 SOLUTION INTRAVENOUS at 09:05

## 2017-05-26 RX ADMIN — HYDROMORPHONE HYDROCHLORIDE 0.2 MG: 1 INJECTION, SOLUTION INTRAMUSCULAR; INTRAVENOUS; SUBCUTANEOUS at 02:05

## 2017-05-26 RX ADMIN — HYDROCODONE BITARTRATE AND ACETAMINOPHEN 1 TABLET: 5; 325 TABLET ORAL at 07:05

## 2017-05-26 RX ADMIN — LIDOCAINE HYDROCHLORIDE 100 MG: 20 INJECTION, SOLUTION INTRAVENOUS at 07:05

## 2017-05-26 RX ADMIN — PROPOFOL 50 MG: 10 INJECTION, EMULSION INTRAVENOUS at 07:05

## 2017-05-26 RX ADMIN — EPHEDRINE SULFATE 5 MG: 50 INJECTION, SOLUTION INTRAMUSCULAR; INTRAVENOUS; SUBCUTANEOUS at 08:05

## 2017-05-26 RX ADMIN — ROCURONIUM BROMIDE 10 MG: 10 INJECTION, SOLUTION INTRAVENOUS at 08:05

## 2017-05-26 RX ADMIN — LIDOCAINE HYDROCHLORIDE 1 MG: 10 INJECTION, SOLUTION EPIDURAL; INFILTRATION; INTRACAUDAL; PERINEURAL at 06:05

## 2017-05-26 RX ADMIN — MIDAZOLAM HYDROCHLORIDE 2 MG: 1 INJECTION, SOLUTION INTRAMUSCULAR; INTRAVENOUS at 07:05

## 2017-05-26 RX ADMIN — Medication 10 MG: at 09:05

## 2017-05-26 RX ADMIN — ACETAMINOPHEN 1000 MG: 10 INJECTION, SOLUTION INTRAVENOUS at 03:05

## 2017-05-26 RX ADMIN — IBUPROFEN 800 MG: 800 INJECTION INTRAVENOUS at 06:05

## 2017-05-26 RX ADMIN — GLYCOPYRROLATE 0.6 MG: 0.2 INJECTION, SOLUTION INTRAMUSCULAR; INTRAVENOUS at 11:05

## 2017-05-26 RX ADMIN — FENTANYL CITRATE 25 MCG: 50 INJECTION INTRAMUSCULAR; INTRAVENOUS at 01:05

## 2017-05-26 RX ADMIN — PHENYLEPHRINE HYDROCHLORIDE 100 MCG: 10 INJECTION INTRAVENOUS at 07:05

## 2017-05-26 RX ADMIN — ONDANSETRON 4 MG: 2 INJECTION INTRAMUSCULAR; INTRAVENOUS at 11:05

## 2017-05-26 RX ADMIN — ACETAMINOPHEN 1000 MG: 10 INJECTION, SOLUTION INTRAVENOUS at 06:05

## 2017-05-26 RX ADMIN — DEXTROSE 2 G: 50 INJECTION, SOLUTION INTRAVENOUS at 08:05

## 2017-05-26 RX ADMIN — HYDROMORPHONE HYDROCHLORIDE 0.2 MG: 1 INJECTION, SOLUTION INTRAMUSCULAR; INTRAVENOUS; SUBCUTANEOUS at 01:05

## 2017-05-26 RX ADMIN — ACETAMINOPHEN 1000 MG: 10 INJECTION, SOLUTION INTRAVENOUS at 07:05

## 2017-05-26 RX ADMIN — PHENYLEPHRINE HYDROCHLORIDE 200 MCG: 10 INJECTION INTRAVENOUS at 07:05

## 2017-05-26 RX ADMIN — FENTANYL CITRATE 50 MCG: 50 INJECTION, SOLUTION INTRAMUSCULAR; INTRAVENOUS at 09:05

## 2017-05-26 RX ADMIN — SODIUM CHLORIDE: 0.9 INJECTION, SOLUTION INTRAVENOUS at 06:05

## 2017-05-26 RX ADMIN — CEFTRIAXONE 2 G: 2 INJECTION, SOLUTION INTRAVENOUS at 07:05

## 2017-05-26 RX ADMIN — FENTANYL CITRATE 50 MCG: 50 INJECTION, SOLUTION INTRAMUSCULAR; INTRAVENOUS at 10:05

## 2017-05-26 RX ADMIN — PROPOFOL 100 MG: 10 INJECTION, EMULSION INTRAVENOUS at 07:05

## 2017-05-26 RX ADMIN — Medication 20 MG: at 09:05

## 2017-05-26 RX ADMIN — METRONIDAZOLE 500 MG: 500 INJECTION, SOLUTION INTRAVENOUS at 03:05

## 2017-05-26 RX ADMIN — DEXTROSE 2 G: 50 INJECTION, SOLUTION INTRAVENOUS at 03:05

## 2017-05-26 RX ADMIN — HYDROCODONE BITARTRATE AND ACETAMINOPHEN 1 TABLET: 5; 325 TABLET ORAL at 11:05

## 2017-05-26 RX ADMIN — ROCURONIUM BROMIDE 20 MG: 10 INJECTION, SOLUTION INTRAVENOUS at 08:05

## 2017-05-26 RX ADMIN — Medication 20 MG: at 10:05

## 2017-05-26 RX ADMIN — ALVIMOPAN 12 MG: 12 CAPSULE ORAL at 08:05

## 2017-05-26 RX ADMIN — HYDROMORPHONE HYDROCHLORIDE 1 MG: 1 INJECTION, SOLUTION INTRAMUSCULAR; INTRAVENOUS; SUBCUTANEOUS at 09:05

## 2017-05-26 RX ADMIN — IBUPROFEN 800 MG: 800 INJECTION INTRAVENOUS at 02:05

## 2017-05-26 RX ADMIN — METRONIDAZOLE 500 MG: 500 INJECTION, SOLUTION INTRAVENOUS at 10:05

## 2017-05-26 NOTE — ANESTHESIA POSTPROCEDURE EVALUATION
"Anesthesia Post Evaluation    Patient: Pedro Nelson    Procedure(s) Performed: Procedure(s) (LRB):  ILEOCOLECTOMY open (N/A)  CATHETERIZATION-URETERAL (Bilateral)  EXPLORATORY-LAPAROTOMY (N/A)  LYSIS-ADHESION (N/A)  RESECTION - SMALL BOWEL  ANASTOMOSIS-INTESTINAL  REPAIR-FISTULA-ENTEROCUTANEOUS (N/A)  WRAP-OMENTAL (N/A)    Final Anesthesia Type: general  Patient location during evaluation: PACU  Patient participation: Yes- Able to Participate  Level of consciousness: awake and alert, awake and oriented  Post-procedure vital signs: reviewed and stable  Pain management: adequate  Airway patency: patent  PONV status at discharge: No PONV  Anesthetic complications: no      Cardiovascular status: blood pressure returned to baseline and hemodynamically stable  Respiratory status: unassisted, spontaneous ventilation and room air  Hydration status: euvolemic  Follow-up not needed.        Visit Vitals  BP (!) 101/58   Pulse 73   Temp 36.6 °C (97.9 °F) (Oral)   Resp 18   Ht 6' 3" (1.905 m)   Wt 59 kg (130 lb)   SpO2 100%   BMI 16.25 kg/m²       Pain/Pravin Score: Pain Assessment Performed: Yes (5/26/2017  2:00 PM)  Presence of Pain: non-verbal indicators absent (5/26/2017  2:00 PM)  Pain Rating Prior to Med Admin: 7 (5/26/2017  2:35 PM)  Pravin Score: 9 (5/26/2017  2:00 PM)  Modified Pravin Score: 18 (5/26/2017  1:18 PM)      "

## 2017-05-26 NOTE — H&P (VIEW-ONLY)
History & Physical    SUBJECTIVE:     Chief Complaint: Enterocutaneous fistula     History of Present Illness:  Patient is a 41 y.o. male  With a long history of penetrating Crohn's disase presents for preoperative evaluation. 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 Patient was admitted to the CRS service with subcutaneus abscesses treated with IR drainage in FEB2017 and CT scan scan findings concerning for fistula formation from the terminal ileum.  He was discharged with the drains however developed an enterocutaneous fistula. He was sent to a gastroenterologist with plans to start biologic therapy however patient underwent a colonscopy by the GI physician and given the large amount of discharge from the fistula, the recommendation to proceed with surgery to remove the fistula prior to starting the therapy.  He is here to schedule the procedure.      Review of patient's allergies indicates:  No Known Allergies    No past medical history on file.  No past surgical history on file.  No family history on file.  Social History   Substance Use Topics    Smoking status: Current Every Day Smoker     Packs/day: 0.25     Years: 10.00     Types: Cigarettes    Smokeless tobacco: None    Alcohol use No        Review of Systems:  Constitutional: no fever or chills  Eyes: no visual changes  ENT: no nasal congestion or sore throat  Respiratory: no cough or shortness of breath  Cardiovascular: no chest pain or palpitations  Gastrointestinal: no nausea or vomiting, tolerating diet    OBJECTIVE:     Vital Signs (Most Recent)  Pulse: 87 (05/10/17 1401)  BP: 109/67 (05/10/17 1401)    Physical Exam:  General: White male in NAD sitting in chair in clinic  Neuro: aaox4 maex4 perrl  Respiratory: resps even unlabored  Cardiac: cap refill <2 sec  Abdomen: RLQ external opening with succus noted on guaze   Extremities: Warm dry and intact  Anorectal:  deferred    Laboratory  CBC: pending  BMP: pending     Diagnostic Results:  CT: Reviewed   CSP images reviewed, report pending     ASSESSMENT/PLAN:     Plan for ileocolectomy on 26MAY2017   Patient will undergo CT enterography tomorrow  Labs to be drawn prior to procedure    Admission paperwork was accomplished including operative consent and consent for blood and blood product administration.    The procedure was explained to the patient including indications, risks and alternatives. The patient verbalized understanding and has given informed consent.    Jamaal Plasencia MD  Colon and Rectal Surgery Fellow  [phone]    I have personally obtained a history and performed a physical exam with and independent to my resident and discussed the findings and plan with the patient.  I agree with the above findings and plan with the following exceptions:  None    H, Benjamin Upton MD, FACS, FASCRS  Staff Surgeon  Dept of Colon and Rectal Surgery  [facility] [place], [place]

## 2017-05-26 NOTE — ANESTHESIA PREPROCEDURE EVALUATION
05/25/2017  Pedro Nelson is a 41 y.o., male current 1ppd smoker with crohns here for resection enterocutaneous fistula.    Discussed smoking cessation.  Discussed rescue blocks if surgeons do not use local    Anesthesia Evaluation         Review of Systems  Anesthesia Hx:  History of prior surgery of interest to airway management or planning: Denies Family Hx of Anesthesia complications.  Denies Personal Hx of Anesthesia complications.   Social:  Smoker    Hematology/Oncology:         -- Anemia:   EENT/Dental:EENT/Dental Normal   Cardiovascular:   Exercise tolerance: good Denies Hypertension.   Denies Angina. ECG has been reviewed.    >4 mets   Pulmonary:  Pulmonary Normal    Renal/:  Renal/ Normal     Hepatic/GI:     Crohns with enterocutaneous fistula   Neurological:  Neurology Normal Denies Seizures.    Endocrine:  Endocrine Normal Denies Diabetes.    Psych:  Psychiatric Normal           Physical Exam  General:  Cachexia    Airway/Jaw/Neck:  Airway Findings: Mouth Opening: Small, but > 3cm Tongue: Normal  General Airway Assessment: Adult, Possible difficult intubation  Mallampati: III  Improves to II with phonation.  TM Distance: Normal, at least 6 cm  Jaw/Neck Findings:     Neck ROM: Normal ROM      Dental:  Dental Findings: In tact, molar caps   Chest/Lungs:  Chest/Lungs Findings: Clear to auscultation, Normal Respiratory Rate     Heart/Vascular:  Heart Findings: Rate: Normal  Rhythm: Regular Rhythm        Mental Status:  Mental Status Findings:  Cooperative, Alert and Oriented         Anesthesia Plan  Type of Anesthesia, risks & benefits discussed:  Anesthesia Type:  general  Patient's Preference:   Intra-op Monitoring Plan: standard ASA monitors  Intra-op Monitoring Plan Comments:   Post Op Pain Control Plan:   Post Op Pain Control Plan Comments:   Induction:   IV  Beta Blocker:  Patient is not  currently on a Beta-Blocker (No further documentation required).       Informed Consent:    ASA Score: 2     Day of Surgery Review of History & Physical:    H&P update referred to the surgeon.     Anesthesia Plan Notes: Discussed rescue nerve blocks/epidural        Ready For Surgery From Anesthesia Perspective.

## 2017-05-26 NOTE — BRIEF OP NOTE
[facility]  Brief Operative Note    SUMMARY     Surgery Date: 5/26/2017     Surgeon(s) and Role:  Panel 1:     * MIGUEL Mcallister Jr., MD - Resident - Assisting     * Jamaal Plasencia MD - Fellow     * OKSANA Upton MD - Primary     * Nataliya Nj MD - Resident - Assisting    Panel 2:     * Broderick Silvestre Jr., MD - Primary        Pre-Operative Care:  Pre-operative bowel prep Mechanical and PO antibiotics  IV antibiotics given within 1 hour of incision? Yes  Preoperative multimodal pain control? Orfirmev, Calador and TAP    Pre-op Diagnosis:  Enterocutaneous fistula [K63.2]  Crohn's disease of small intestine with abscess [K50.014]    Operative Care:  Post-op Diagnosis: Post-Op Diagnosis Codes:     * Enterocutaneous fistula [K63.2]     * Crohn's disease of small intestine with abscess [K50.014]    Procedure(s) (LRB):  ILEOCOLECTOMY open (N/A)  CATHETERIZATION-URETERAL (Bilateral)  EXPLORATORY-LAPAROTOMY (N/A)  LYSIS-ADHESION (N/A)  RESECTION - SMALL BOWEL  ANASTOMOSIS-INTESTINAL  REPAIR-FISTULA-ENTEROCUTANEOUS (N/A)  WRAP-OMENTAL (N/A)    Anesthesia: General  3200  Alb  550 UOP  200 EBL    Technical Procedures Used: Extensive lysis of adhesions, small bowel resection, ileocolectomy STSFETE, handsewn common enterotomy       Use of closing instrument setup? Yes  Gown/Glove Change @ time of closing? Yes  Suction tip change at time of closing? Yes  Wound Protector used if open case? Yes    Description of the findings of the procedure: Large abdominal wall and flank anscess tracking to patient right back, Fistula likely originated from prior ileocolic anastomosis     Small bowel length  LOT--> small bowel anastomosis : 300cm  Total length 450cm      Wound Class (Contaminated    Complications: No     Estimated Blood Loss: 200 mL           Specimens:   Specimen (12h ago through future)    Start     Ordered    05/26/17 1128  Specimen to Pathology - Surgery  Once     Comments:  1. Ileocolic  resection (permanent, no preservatives, to fridge)      05/26/17 1150          Implants: * No implants in log *    Post-Operative Care:         Disposition: PACU - hemodynamically stable.           Condition: Good  PT Temp >36 upon leaving the OR? Yes    Jamaal Plasencia MD  Colon and Rectal Surgery Fellow  [phone]

## 2017-05-26 NOTE — INTERVAL H&P NOTE
The patient has been examined and the H&P has been reviewed:    I concur with the findings and no changes have occurred since H&P was written.    Anesthesia/Surgery risks, benefits and alternative options discussed and understood by patient/family.          Active Hospital Problems    Diagnosis  POA    Ulcerative colitis [K51.90]  Yes      Resolved Hospital Problems    Diagnosis Date Resolved POA   No resolved problems to display.

## 2017-05-26 NOTE — PROGRESS NOTES
Nursing Transfer Note      5/26/2017     Transfer 631    Transfer via stretcher    Transfer with iv pole    Transported by pct    Medicines sent: ivf    Chart send with patient: Yes    Notified: spouse not in waiting room and no answer on cell phone    Patient reassessed at: 1500

## 2017-05-26 NOTE — PLAN OF CARE
Problem: Patient Care Overview  Goal: Plan of Care Review  Outcome: Ongoing (interventions implemented as appropriate)  Plan of care reviewed with pt verbalizing understanding. AAOx4 with VSS  Noting the low BP at time to which MD is aware.Midline incision CDI with dermabond POLI. Right lateral and RLQ abdomin incisions covered with gauze and tape making in difficult to see wound base at this time. Some moist serosang drainage noted to those dressing. Serrano intact with adequate UOP. Remained free from falls or injuries. Call light within reach. WCTM

## 2017-05-26 NOTE — ANESTHESIA RELEASE NOTE
"Anesthesia Release from PACU Note    Patient: Pedro Nelson    Procedure(s) Performed: Procedure(s) (LRB):  ILEOCOLECTOMY open (N/A)  CATHETERIZATION-URETERAL (Bilateral)  EXPLORATORY-LAPAROTOMY (N/A)  LYSIS-ADHESION (N/A)  RESECTION - SMALL BOWEL  ANASTOMOSIS-INTESTINAL  REPAIR-FISTULA-ENTEROCUTANEOUS (N/A)  WRAP-OMENTAL (N/A)    Anesthesia type: general    Post pain: Adequate analgesia    Post assessment: no apparent anesthetic complications, tolerated procedure well and no evidence of recall    Last Vitals:   Visit Vitals  BP (!) 101/58   Pulse 73   Temp 36.6 °C (97.9 °F) (Oral)   Resp 18   Ht 6' 3" (1.905 m)   Wt 59 kg (130 lb)   SpO2 100%   BMI 16.25 kg/m²       Post vital signs: stable    Level of consciousness: awake, alert  and oriented    Nausea/Vomiting: no nausea/no vomiting    Complications: none    Airway Patency: patent    Respiratory: unassisted, spontaneous ventilation, room air    Cardiovascular: stable and blood pressure at baseline    Hydration: euvolemic  "

## 2017-05-26 NOTE — TRANSFER OF CARE
"Anesthesia Transfer of Care Note    Patient: Pedro Nelson    Procedure(s) Performed: Procedure(s) (LRB):  ILEOCOLECTOMY open (N/A)  CATHETERIZATION-URETERAL (Bilateral)  EXPLORATORY-LAPAROTOMY (N/A)  LYSIS-ADHESION (N/A)  RESECTION - SMALL BOWEL  ANASTOMOSIS-INTESTINAL  REPAIR-FISTULA-ENTEROCUTANEOUS (N/A)  WRAP-OMENTAL (N/A)    Patient location: PACU    Anesthesia Type: general    Transport from OR: Transported from OR on 6-10 L/min O2 by face mask with adequate spontaneous ventilation    Post pain: adequate analgesia    Post assessment: no apparent anesthetic complications    Post vital signs: stable    Level of consciousness: awake    Nausea/Vomiting: no nausea/vomiting    Complications: none    Transfer of care protocol was followedComments: Oral airway in place      Last vitals:   Visit Vitals  BP (!) 98/55 (BP Location: Left arm, Patient Position: Lying, BP Method: Automatic)   Pulse 81   Temp 36.6 °C (97.9 °F) (Axillary)   Resp 16   Ht 6' 3" (1.905 m)   Wt 59 kg (130 lb)   SpO2 100%   BMI 16.25 kg/m²     "

## 2017-05-26 NOTE — OP NOTE
[facility]  Operative Note    Date: 05/26/2017    Pre-Op Diagnosis: Chron's disease    Post-Op Diagnosis: same    Procedure(s) Performed:   1.  Cystoscopy with bilateral ureteral catheter placement    Specimen(s): none    Staff Surgeon: Broderick Silvestre MD    Assistant Surgeon: Nataliya Nj MD    Anesthesia: General endotracheal anesthesia    Indications: Pedro Nelson is a 41 y.o. male with chron's disease.  Dr. Upton has requested intra-operative ureteral catheters to allow for early intra-operative identification and repair of any injuries.      Findings:   1. No bladder or urethral abnormalities  2. Bilateral ureteral orifices in normal anatomical position    Estimated Blood Loss: min    Drains:   1.  bilateral 5 Fr ureteral catheters  2.  16 Fr torres catheter    Procedure in Detail: Upon entering the room the patient was under general anesthesia.  The patient was then placed in the dorsal lithotomy position and prepped and draped in the usual sterile fashion. Preoperative antibiotics were administered per the primary surgeon preference.  Timeout was performed.      A 22 Fr cystoscope was inserted into the urethra and formal cystourethroscopy was performed. The urethra was normal.  The right and left ureteral orifices were in the normal anatomic position. There were no mucosal abnormalities. A 0.38 glide wire was inserted into the left  ureteral orifice and advanced to the level of the left renal pelvis. A 5 Fr ureteral catheter was then inserted over the guide wire and the wire was removed. The cystoscope was then removed leaving the ureteral catheter in place.     The cystoscope was then reinserted alongside the ureteral catheter and a 0.38 guidewire was inserted into the right ureteral orifice and advanced to the level of the right renal pelvis. A 5 Fr ureteral catheter was advanced over the wire and the wire was removed. The cystoscope was then removed.      A 16 Fr torres catheter was  inserted and the balloon was filled with 10mL of sterile water. The ureteral catheters were secured in the standard fashion. There were no complications with the procedure and the patient tolerated our procedure well.     The case was then turned over to the primary surgeon.     Nataliya Nj MD

## 2017-05-27 PROBLEM — K50.013 CROHN'S DISEASE OF ILEUM WITH FISTULA: Status: ACTIVE | Noted: 2017-05-26

## 2017-05-27 LAB
ANION GAP SERPL CALC-SCNC: 7 MMOL/L
BASOPHILS # BLD AUTO: 0.01 K/UL
BASOPHILS NFR BLD: 0.1 %
BUN SERPL-MCNC: 6 MG/DL
CALCIUM SERPL-MCNC: 8.2 MG/DL
CHLORIDE SERPL-SCNC: 103 MMOL/L
CO2 SERPL-SCNC: 26 MMOL/L
CREAT SERPL-MCNC: 0.7 MG/DL
DIFFERENTIAL METHOD: ABNORMAL
EOSINOPHIL # BLD AUTO: 0 K/UL
EOSINOPHIL NFR BLD: 0.3 %
ERYTHROCYTE [DISTWIDTH] IN BLOOD BY AUTOMATED COUNT: 16.4 %
EST. GFR  (AFRICAN AMERICAN): >60 ML/MIN/1.73 M^2
EST. GFR  (NON AFRICAN AMERICAN): >60 ML/MIN/1.73 M^2
GLUCOSE SERPL-MCNC: 99 MG/DL
HCT VFR BLD AUTO: 31.1 %
HGB BLD-MCNC: 9.6 G/DL
LYMPHOCYTES # BLD AUTO: 1.2 K/UL
LYMPHOCYTES NFR BLD: 7.4 %
MCH RBC QN AUTO: 24.7 PG
MCHC RBC AUTO-ENTMCNC: 30.9 %
MCV RBC AUTO: 80 FL
MONOCYTES # BLD AUTO: 0.9 K/UL
MONOCYTES NFR BLD: 5.5 %
NEUTROPHILS # BLD AUTO: 13.4 K/UL
NEUTROPHILS NFR BLD: 86.4 %
PLATELET # BLD AUTO: 390 K/UL
PMV BLD AUTO: 9 FL
POTASSIUM SERPL-SCNC: 4.3 MMOL/L
RBC # BLD AUTO: 3.89 M/UL
SODIUM SERPL-SCNC: 136 MMOL/L
WBC # BLD AUTO: 15.5 K/UL

## 2017-05-27 PROCEDURE — 80048 BASIC METABOLIC PNL TOTAL CA: CPT

## 2017-05-27 PROCEDURE — 36415 COLL VENOUS BLD VENIPUNCTURE: CPT

## 2017-05-27 PROCEDURE — 63600175 PHARM REV CODE 636 W HCPCS: Performed by: STUDENT IN AN ORGANIZED HEALTH CARE EDUCATION/TRAINING PROGRAM

## 2017-05-27 PROCEDURE — 94799 UNLISTED PULMONARY SVC/PX: CPT

## 2017-05-27 PROCEDURE — 85025 COMPLETE CBC W/AUTO DIFF WBC: CPT

## 2017-05-27 PROCEDURE — 20600001 HC STEP DOWN PRIVATE ROOM

## 2017-05-27 PROCEDURE — 63600175 PHARM REV CODE 636 W HCPCS: Performed by: SURGERY

## 2017-05-27 PROCEDURE — 25000003 PHARM REV CODE 250: Performed by: SURGERY

## 2017-05-27 RX ORDER — HYDROMORPHONE HYDROCHLORIDE 1 MG/ML
1 INJECTION, SOLUTION INTRAMUSCULAR; INTRAVENOUS; SUBCUTANEOUS
Status: DISCONTINUED | OUTPATIENT
Start: 2017-05-27 | End: 2017-06-03 | Stop reason: HOSPADM

## 2017-05-27 RX ORDER — ONDANSETRON 2 MG/ML
4 INJECTION INTRAMUSCULAR; INTRAVENOUS EVERY 6 HOURS PRN
Status: DISCONTINUED | OUTPATIENT
Start: 2017-05-27 | End: 2017-06-03 | Stop reason: HOSPADM

## 2017-05-27 RX ADMIN — HYDROMORPHONE HYDROCHLORIDE 1 MG: 1 INJECTION, SOLUTION INTRAMUSCULAR; INTRAVENOUS; SUBCUTANEOUS at 07:05

## 2017-05-27 RX ADMIN — ONDANSETRON 4 MG: 2 INJECTION INTRAMUSCULAR; INTRAVENOUS at 11:05

## 2017-05-27 RX ADMIN — HYDROMORPHONE HYDROCHLORIDE 1 MG: 1 INJECTION, SOLUTION INTRAMUSCULAR; INTRAVENOUS; SUBCUTANEOUS at 11:05

## 2017-05-27 RX ADMIN — ALVIMOPAN 12 MG: 12 CAPSULE ORAL at 08:05

## 2017-05-27 RX ADMIN — HYDROMORPHONE HYDROCHLORIDE 1 MG: 1 INJECTION, SOLUTION INTRAMUSCULAR; INTRAVENOUS; SUBCUTANEOUS at 08:05

## 2017-05-27 RX ADMIN — IBUPROFEN 800 MG: 800 INJECTION INTRAVENOUS at 01:05

## 2017-05-27 RX ADMIN — HYDROMORPHONE HYDROCHLORIDE 1 MG: 1 INJECTION, SOLUTION INTRAMUSCULAR; INTRAVENOUS; SUBCUTANEOUS at 04:05

## 2017-05-27 RX ADMIN — ONDANSETRON 4 MG: 2 INJECTION INTRAMUSCULAR; INTRAVENOUS at 07:05

## 2017-05-27 RX ADMIN — HYDROMORPHONE HYDROCHLORIDE 1 MG: 1 INJECTION, SOLUTION INTRAMUSCULAR; INTRAVENOUS; SUBCUTANEOUS at 01:05

## 2017-05-27 RX ADMIN — HYDROCODONE BITARTRATE AND ACETAMINOPHEN 1 TABLET: 5; 325 TABLET ORAL at 02:05

## 2017-05-27 RX ADMIN — HYDROCODONE BITARTRATE AND ACETAMINOPHEN 1 TABLET: 5; 325 TABLET ORAL at 04:05

## 2017-05-27 RX ADMIN — HYDROCODONE BITARTRATE AND ACETAMINOPHEN 1 TABLET: 5; 325 TABLET ORAL at 07:05

## 2017-05-27 RX ADMIN — IBUPROFEN 800 MG: 800 INJECTION INTRAVENOUS at 10:05

## 2017-05-27 RX ADMIN — SODIUM CHLORIDE, SODIUM LACTATE, POTASSIUM CHLORIDE, AND CALCIUM CHLORIDE: .6; .31; .03; .02 INJECTION, SOLUTION INTRAVENOUS at 08:05

## 2017-05-27 RX ADMIN — IBUPROFEN 800 MG: 800 INJECTION INTRAVENOUS at 05:05

## 2017-05-27 RX ADMIN — ALVIMOPAN 12 MG: 12 CAPSULE ORAL at 11:05

## 2017-05-27 RX ADMIN — SODIUM CHLORIDE, SODIUM LACTATE, POTASSIUM CHLORIDE, AND CALCIUM CHLORIDE: .6; .31; .03; .02 INJECTION, SOLUTION INTRAVENOUS at 07:05

## 2017-05-27 NOTE — PLAN OF CARE
Problem: Patient Care Overview  Goal: Plan of Care Review  Outcome: Ongoing (interventions implemented as appropriate)  The rounding  In the morning 5/27/17 gave a verbal order to DC the Torres.  The pt tolerated the removal of the torres with no complaints. The pt's pain was controlled with oral and IV pain medications spaced about every two hrs.  Breathing was regular equal and unlabored bilaterally on room air.

## 2017-05-27 NOTE — PROGRESS NOTES
GENERAL SURGERY  Progress Note    Hospital Day: 2  Admit Date: 5/26/2017    Date of Surgery:  5/26/2017  Post-Operative Day #:  1 Day Post-Op    Procedure:  Procedure(s):  ILEOCOLECTOMY open (N/A)  CATHETERIZATION-URETERAL (Bilateral)  EXPLORATORY-LAPAROTOMY (N/A)  LYSIS-ADHESION (N/A)  RESECTION - SMALL BOWEL  ANASTOMOSIS-INTESTINAL  REPAIR-FISTULA-ENTEROCUTANEOUS (N/A)  WRAP-OMENTAL (N/A)    SUBJECTIVE:     No acute events.  Pain controlled.  Tolerated CLD    Current Medications:   acetaminophen  1,000 mg Intravenous Q8H    alvimopan  12 mg Oral BID    ibuprofen  800 mg Intravenous Q8H     Infusions:   lactated ringers 100 mL/hr at 05/27/17 0714     PRN:hydrocodone-acetaminophen 5-325mg, HYDROmorphone, naloxone, ondansetron    Review of patient's allergies indicates:  No Known Allergies    OBJECTIVE:     Most Recent Vitals:  Temp: 98.5 °F (36.9 °C) (05/27/17 0725)  Pulse: 70 (05/27/17 0755)  Resp: 18 (05/27/17 0725)  BP: 118/64 (05/27/17 0725)  SpO2: 98 % (05/27/17 0755)    24-Hour Vitals Range:  Temp:  [96.5 °F (35.8 °C)-98.5 °F (36.9 °C)]   Pulse:  [68-90]   Resp:  [16-21]   BP: ()/(51-65)   SpO2:  [98 %-100 %]     24-Hour I&O:  Intake/Output Summary (Last 24 hours) at 05/27/17 1009  Last data filed at 05/27/17 0715   Gross per 24 hour   Intake           3349.2 ml   Output           1585.5 ml   Net           1763.7 ml       PHYSICAL EXAM:  AAO, NAD, well developed and well nourished  Head normocephalic, atraumatic  Trachea midline, neck supple  Respirations unlabored with good inspiratory effort  Heart regular rate and rhythm  Abdomen soft, aquacel packing in R flank and RLQ wound, nondistended, appropriately TTP    LAB RESULTS:    Recent Labs  Lab 05/27/17  0434   WBC 15.50*   HGB 9.6*   HCT 31.1*   *     Recent Labs  Lab 05/27/17  0434      K 4.3      CO2 26   BUN 6   CREATININE 0.7   GLU 99   CALCIUM 8.2*         ASSESSMENT:     Pedro Nelson is a 41 y.o. male who is now 1 Day  Post-Op s/p Procedure(s):  ILEOCOLECTOMY open (N/A)  CATHETERIZATION-URETERAL (Bilateral)  EXPLORATORY-LAPAROTOMY (N/A)  LYSIS-ADHESION (N/A)  RESECTION - SMALL BOWEL  ANASTOMOSIS-INTESTINAL  REPAIR-FISTULA-ENTEROCUTANEOUS (N/A)  WRAP-OMENTAL (N/A)    PLAN:  · CLD  · Continue current pain regimen  · D/C Serrano  · Daily dressing changes to R flank and RLQ wound  · Trend daily labs.  · OOB/ambulate.  · Prophylaxis:  Mechanical DVT ppx       Geovany Costello MD

## 2017-05-27 NOTE — OP NOTE
DATE OF PROCEDURE:  05/26/2017    INDICATIONS:  The patient is a 41-year-old male with history of Crohn ileitis,   status post previous ileocolic resection more than 20 years ago.  He presented   approximately three months ago with evidence of recurrent ileitis with formation   of enterocutaneous fistulae.  He had one that went to the right flank and one   to the right lower quadrant.  These developed following drainage of an abdominal   wall abscess percutaneously.  The sepsis was controlled, but the fistulae   developed and were not manageable.  It was felt that additional medical therapy   following consultation with Gastroenterology was not a reasonable option for   control of the fistulae.  The decision was made ultimately to proceed with   surgical repair, probable bowel resection.  The details of the procedure,   functional consequences and potential risks were discussed at length with the   patient.    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    PROCEDURES PERFORMED:  Exploratory laparotomy, extensive lysis of adhesions   lasting greater than 2 hours, resection of ileocolonic anastomosis, additional   small bowel resection with anastomosis, repair of enterocutaneous fistula with a   pedicled omental graft.    SURGEON:  OKSANA Upton M.D.    ASSISTANT SURGEONS:  Jamaal Plasencia M.D. (RES) and MIGUEL Mcallister Jr., M.D. (RES).    TYPE OF ANESTHESIA:  General endotracheal.    ESTIMATED BLOOD LOSS:  300 mL    DRAINS:  None.    SPECIMENS:  1.  Ileocolonic anastomosis.  2.  Small bowel resection.    FINDINGS:  The patient was noted to have evidence of fistula to the right lower   quadrant and to the right flank.  These were related to a cavity in the right   lateral abdominal wall, which were ultimately connected to the neoterminal   ileum.  There was a portion of small  intestine, ileum, which was proximal to   these fistulae and was densely adherent to the fistulized process and was not   salvageable and required a small bowel resection.  Additional resection was 3 cm   from the ligament of Treitz.  The residual small intestine following resection   was 450 cm.  There was no evidence of additional Crohn disease in the remaining   small intestine or large intestine.  There was no evidence of active   suppuration.    TECHNIQUE IN DETAIL:  The patient was brought to the Operating Room, positively   identified and placed on the operating table in supine position with sequential   compression devices on his lower extremities.  The patient had undergone an   outpatient oral mechanical and oral antibiotic bowel preparation.  He received   intravenous antibiotics.  He underwent general endotracheal anesthesia without   complication.  A Serrano catheter and orogastric tube were inserted.  Both arms   were tucked at his side.  The abdomen was prepared and draped in usual fashion.    A critical timeout was performed.  The patient was explored through a long   midline incision, which excised the previous wide scar and curved to the right   of the umbilicus.  The abdomen was entered with care being taken to avoid injury   to the underlying viscera.  Upon opening the abdomen, there were noted to be   adhesions of the omentum and small intestine to the anterior abdominal wall.    Extensive adhesions were encountered, both intraloop as well as to the right and   left sides of the abdomen necessitating extensive lysis of adhesions lasting   more than 2 hours.  We lysed all the adhesions, first on the left side of the   abdomen, freeing up the small bowel.  We carefully inspected the colon distal to   the prior ileocolonic anastomosis.  It was noted to be unremarkable.  The small   bowel was then carefully run from ligament of Treitz to a portion of ileum,   which was adherent to the fistulizing  "process in the right side of the abdomen,   but it did not appear to be involved with Crohn disease.  This appeared to be a   second fistula, ileoileal.  Ultimately, I dissected the mass of bowel off of the   anterior abdominal wall on the right side.  I could identify a ureteral stent   placed by Urology on the right side and the right ureter retroperitoneum was   carefully preserved from harm.  I was able to dissect the mass off of the right   colic gutter and the fistula was encountered.  Rapid control of succuss was   obtained.  We irrigated this area extensively.  We controlled the bowel proximal   by dividing the bowel proximal to the ileocolonic anastomosis in a portion of   small bowel, which did not have thickened mesentery.  This was 2 cm from the   "step off" of thickened mesentery.  This was performed using a LOW 75 mm   stapling device.  The colon was transected in the proximal transverse colon   using the same stapler.    We then addressed the portion of ileum that was adherent to the mass.  I   attempted to dissect this free from the mass, but it was impossible to dissect   it free due to a second ileoileal fistula.    I dissected the bowel off of the mass.  The opened bowel was carefully inspected   and there was no evidence of Crohn disease.  The mesentery in this area was   normal.  Through this common defect, I then placed LOW-75 mm stapler and fired   it along the antimesenteric border of the bowel.  We amputated this portion of   bowel by placing the stapler just proximal to the defect excising a small   portion of bowel and ensuring that the side-to-side anastomosis was of adequate   size.  The mesentery to this small portion of bowel to be excised was transected   between clamps and suture ligated with 3-0 Vicryl suture.  Hemostasis was   assured.  A 3-0 Vicryl suture was placed at the crotch of the anastomosis.    There was no tension and blood supply was excellent.    We then carefully " transected the mesentery to the portion of bowel that   represented the ileocolonic anastomosis.  The bowel was transected between   [facility] clamps and the mesentery suture ligated with 0 Vicryl suture.  We then   handed the specimen off the operative field and opened on a back table by Dr. Ángel Mcallister.  Proximal and distal resection margins were noted to be normal   without evidence of involvement of Crohn's.    We changed gown and gloves.  We performed a side-to-side functional end-to-end   ileal to transverse colon anastomosis.  Ileal and colon defects were created 2   cm away from the stapled ends of bowel and the stapler was fired along the   antimesenteric border of the bowel.  The bowel was carefully inspected.    Hemostasis was assured.  There was no evidence of Crohn disease in the 2 ends of   bowel that were anastomosed.  We then closed the common defect using 2 layers   of continuous 3-0 Vicryl suture with a second layer imbricating the first.  A   3-0 Vicryl suture was placed at the crotch of the anastomosis.    We then irrigated the fistula cavity and right side of the abdominal wall using   a Pulsavac device with 3 liters of bacitracin saline solution.  We curetted the   tract of all granulation tissue.  We irrigated the tract with the same Pulsavac   device.    Hemostasis was assured.  I harvested the omentum off of the transverse colon and   omental graft based upon the left gastroepiploic artery, was placed over the   ileocolonic anastomosis.  The anastomosis was well away from the duodenum.  The   omentum was then sutured to the fistula defect of the abdominal wall and the   right colic gutter using interrupted 3-0 Vicryl suture.  The small bowel was   then carefully run from ligament of Treitz to the ileocolonic anastomosis.  The   proximal small bowel anastomosis was 300 cm from the ligament of Treitz.  The   ileocolonic anastomosis was 450 cm from the ligament of Treitz.  The small bowel   was  returned to its position.  Hemostasis was assured.  Exparel was diluted   with 0.25% Marcaine and saline and infiltrated in the anterior abdominal wall   lateral to the rectus sheath in the transversus abdominis plane.  It was also   injected in the subcutaneous tissues of the incision.    We changed gown and gloves and used a separate closing tray to close the   abdomen.  The linea alba was reapproximated using continuous looped #1 PDS   suture.  The wound was irrigated with 2 liters of saline solution.  Hemostasis   was assured.  The skin incision was closed using subcuticular 4-0 Monocryl.    Skin glue was applied.  We then turned the patient on his left side and   carefully curetted the right flank wound.  Both the right flank wound and the   right lower quadrant wounds, both of these representing the cutaneous portions   of the fistulae were then packed with iodoform gauze.  They were covered with   dry dressings.    The patient was awakened from anesthesia, extubated and returned to the Recovery   area in stable condition.      HERNÁN  dd: 05/27/2017 09:01:52 (CDT)  td: 05/27/2017 11:13:54 (CD)  Doc ID   #[id number]  Job ID #[id number]    CC:

## 2017-05-28 LAB
ANION GAP SERPL CALC-SCNC: 8 MMOL/L
BASOPHILS # BLD AUTO: 0.01 K/UL
BASOPHILS NFR BLD: 0.1 %
BUN SERPL-MCNC: 11 MG/DL
CALCIUM SERPL-MCNC: 8.7 MG/DL
CHLORIDE SERPL-SCNC: 98 MMOL/L
CO2 SERPL-SCNC: 31 MMOL/L
CREAT SERPL-MCNC: 0.8 MG/DL
DIFFERENTIAL METHOD: ABNORMAL
EOSINOPHIL # BLD AUTO: 0 K/UL
EOSINOPHIL NFR BLD: 0.1 %
ERYTHROCYTE [DISTWIDTH] IN BLOOD BY AUTOMATED COUNT: 16.4 %
EST. GFR  (AFRICAN AMERICAN): >60 ML/MIN/1.73 M^2
EST. GFR  (NON AFRICAN AMERICAN): >60 ML/MIN/1.73 M^2
GLUCOSE SERPL-MCNC: 122 MG/DL
HCT VFR BLD AUTO: 27.3 %
HGB BLD-MCNC: 8.6 G/DL
LYMPHOCYTES # BLD AUTO: 0.8 K/UL
LYMPHOCYTES NFR BLD: 3.8 %
MCH RBC QN AUTO: 25.1 PG
MCHC RBC AUTO-ENTMCNC: 31.5 %
MCV RBC AUTO: 80 FL
MONOCYTES # BLD AUTO: 1.3 K/UL
MONOCYTES NFR BLD: 6.8 %
NEUTROPHILS # BLD AUTO: 17.4 K/UL
NEUTROPHILS NFR BLD: 88.7 %
PLATELET # BLD AUTO: 405 K/UL
PMV BLD AUTO: 8.8 FL
POTASSIUM SERPL-SCNC: 3.6 MMOL/L
RBC # BLD AUTO: 3.42 M/UL
SODIUM SERPL-SCNC: 137 MMOL/L
WBC # BLD AUTO: 19.59 K/UL

## 2017-05-28 PROCEDURE — 80048 BASIC METABOLIC PNL TOTAL CA: CPT

## 2017-05-28 PROCEDURE — 63600175 PHARM REV CODE 636 W HCPCS: Performed by: STUDENT IN AN ORGANIZED HEALTH CARE EDUCATION/TRAINING PROGRAM

## 2017-05-28 PROCEDURE — 25000003 PHARM REV CODE 250: Performed by: SURGERY

## 2017-05-28 PROCEDURE — C9113 INJ PANTOPRAZOLE SODIUM, VIA: HCPCS | Performed by: STUDENT IN AN ORGANIZED HEALTH CARE EDUCATION/TRAINING PROGRAM

## 2017-05-28 PROCEDURE — 25000003 PHARM REV CODE 250: Performed by: STUDENT IN AN ORGANIZED HEALTH CARE EDUCATION/TRAINING PROGRAM

## 2017-05-28 PROCEDURE — 85025 COMPLETE CBC W/AUTO DIFF WBC: CPT

## 2017-05-28 PROCEDURE — 36415 COLL VENOUS BLD VENIPUNCTURE: CPT

## 2017-05-28 PROCEDURE — 63600175 PHARM REV CODE 636 W HCPCS: Performed by: SURGERY

## 2017-05-28 PROCEDURE — 63600175 PHARM REV CODE 636 W HCPCS: Performed by: COLON & RECTAL SURGERY

## 2017-05-28 PROCEDURE — 20600001 HC STEP DOWN PRIVATE ROOM

## 2017-05-28 RX ORDER — ENOXAPARIN SODIUM 100 MG/ML
40 INJECTION SUBCUTANEOUS EVERY 24 HOURS
Status: DISCONTINUED | OUTPATIENT
Start: 2017-05-28 | End: 2017-06-02

## 2017-05-28 RX ORDER — CALCIUM CARBONATE 200(500)MG
500 TABLET,CHEWABLE ORAL ONCE
Status: COMPLETED | OUTPATIENT
Start: 2017-05-28 | End: 2017-05-28

## 2017-05-28 RX ORDER — POTASSIUM CHLORIDE 7.45 MG/ML
10 INJECTION INTRAVENOUS
Status: COMPLETED | OUTPATIENT
Start: 2017-05-28 | End: 2017-05-28

## 2017-05-28 RX ORDER — PANTOPRAZOLE SODIUM 40 MG/10ML
40 INJECTION, POWDER, LYOPHILIZED, FOR SOLUTION INTRAVENOUS DAILY
Status: DISCONTINUED | OUTPATIENT
Start: 2017-05-28 | End: 2017-06-03 | Stop reason: HOSPADM

## 2017-05-28 RX ORDER — PANTOPRAZOLE SODIUM 40 MG/1
40 TABLET, DELAYED RELEASE ORAL DAILY
Status: DISCONTINUED | OUTPATIENT
Start: 2017-05-29 | End: 2017-05-28

## 2017-05-28 RX ADMIN — ONDANSETRON 4 MG: 2 INJECTION INTRAMUSCULAR; INTRAVENOUS at 01:05

## 2017-05-28 RX ADMIN — SODIUM CHLORIDE, SODIUM LACTATE, POTASSIUM CHLORIDE, AND CALCIUM CHLORIDE: .6; .31; .03; .02 INJECTION, SOLUTION INTRAVENOUS at 04:05

## 2017-05-28 RX ADMIN — HYDROMORPHONE HYDROCHLORIDE 1 MG: 1 INJECTION, SOLUTION INTRAMUSCULAR; INTRAVENOUS; SUBCUTANEOUS at 09:05

## 2017-05-28 RX ADMIN — ALVIMOPAN 12 MG: 12 CAPSULE ORAL at 09:05

## 2017-05-28 RX ADMIN — IBUPROFEN 800 MG: 800 INJECTION INTRAVENOUS at 06:05

## 2017-05-28 RX ADMIN — HYDROMORPHONE HYDROCHLORIDE 1 MG: 1 INJECTION, SOLUTION INTRAMUSCULAR; INTRAVENOUS; SUBCUTANEOUS at 10:05

## 2017-05-28 RX ADMIN — PANTOPRAZOLE SODIUM 40 MG: 40 INJECTION, POWDER, FOR SOLUTION INTRAVENOUS at 08:05

## 2017-05-28 RX ADMIN — POTASSIUM CHLORIDE 10 MEQ: 10 INJECTION, SOLUTION INTRAVENOUS at 12:05

## 2017-05-28 RX ADMIN — ONDANSETRON 4 MG: 2 INJECTION INTRAMUSCULAR; INTRAVENOUS at 05:05

## 2017-05-28 RX ADMIN — ENOXAPARIN SODIUM 40 MG: 100 INJECTION SUBCUTANEOUS at 12:05

## 2017-05-28 RX ADMIN — HYDROMORPHONE HYDROCHLORIDE 1 MG: 1 INJECTION, SOLUTION INTRAMUSCULAR; INTRAVENOUS; SUBCUTANEOUS at 03:05

## 2017-05-28 RX ADMIN — IBUPROFEN 800 MG: 800 INJECTION INTRAVENOUS at 09:05

## 2017-05-28 RX ADMIN — IBUPROFEN 800 MG: 800 INJECTION INTRAVENOUS at 02:05

## 2017-05-28 RX ADMIN — POTASSIUM CHLORIDE 10 MEQ: 10 INJECTION, SOLUTION INTRAVENOUS at 11:05

## 2017-05-28 RX ADMIN — HYDROMORPHONE HYDROCHLORIDE 1 MG: 1 INJECTION, SOLUTION INTRAMUSCULAR; INTRAVENOUS; SUBCUTANEOUS at 02:05

## 2017-05-28 RX ADMIN — HYDROMORPHONE HYDROCHLORIDE 1 MG: 1 INJECTION, SOLUTION INTRAMUSCULAR; INTRAVENOUS; SUBCUTANEOUS at 12:05

## 2017-05-28 RX ADMIN — HYDROCODONE BITARTRATE AND ACETAMINOPHEN 1 TABLET: 5; 325 TABLET ORAL at 01:05

## 2017-05-28 RX ADMIN — POTASSIUM CHLORIDE 10 MEQ: 10 INJECTION, SOLUTION INTRAVENOUS at 10:05

## 2017-05-28 RX ADMIN — HYDROMORPHONE HYDROCHLORIDE 1 MG: 1 INJECTION, SOLUTION INTRAMUSCULAR; INTRAVENOUS; SUBCUTANEOUS at 06:05

## 2017-05-28 RX ADMIN — CALCIUM CARBONATE (ANTACID) CHEW TAB 500 MG 500 MG: 500 CHEW TAB at 09:05

## 2017-05-28 RX ADMIN — POTASSIUM CHLORIDE 10 MEQ: 10 INJECTION, SOLUTION INTRAVENOUS at 01:05

## 2017-05-28 RX ADMIN — ONDANSETRON 4 MG: 2 INJECTION INTRAMUSCULAR; INTRAVENOUS at 09:05

## 2017-05-28 NOTE — PROGRESS NOTES
Dr Costello notified of the pt not being able to swallow his PO pain med because of his nausea.    It was to soon to give nausea medication because it was Q 12 hr and the IV pain medication was Q 4 hr.  Dr Costello changed the nausea medication to Q 6 hr and the IV pain medication to Q 3 hr.

## 2017-05-28 NOTE — PLAN OF CARE
Problem: Patient Care Overview  Goal: Plan of Care Review  Outcome: Ongoing (interventions implemented as appropriate)  Pt is AAOx4.  The pt complains of nausea and heart burn.  Dr Costello notified of pt's inability to control pain due to issues swallowing pills for pain control.  Dr Costello modified medications for pain and nausea and it appears to be working for the pt. Midline is POLI with no visible issues of discharge. The pt is able to reposition self in bed.

## 2017-05-28 NOTE — PROGRESS NOTES
GENERAL SURGERY  Progress Note    Hospital Day: 3  Admit Date: 5/26/2017    Date of Surgery:  5/26/2017  Post-Operative Day #:  2 Days Post-Op    Procedure:  Procedure(s):  ILEOCOLECTOMY open (N/A)  CATHETERIZATION-URETERAL (Bilateral)  EXPLORATORY-LAPAROTOMY (N/A)  LYSIS-ADHESION (N/A)  RESECTION - SMALL BOWEL  ANASTOMOSIS-INTESTINAL  REPAIR-FISTULA-ENTEROCUTANEOUS (N/A)  WRAP-OMENTAL (N/A)    SUBJECTIVE:     No acute events.  Pain controlled.  Tolerated CLD with some nausea    Current Medications:   alvimopan  12 mg Oral BID    ibuprofen  800 mg Intravenous Q8H     Infusions:   lactated ringers 100 mL/hr at 05/27/17 2040     PRN:hydrocodone-acetaminophen 5-325mg, HYDROmorphone, naloxone, ondansetron    Review of patient's allergies indicates:  No Known Allergies    OBJECTIVE:     Most Recent Vitals:  Temp: 98.4 °F (36.9 °C) (05/28/17 0729)  Pulse: 91 (05/28/17 0729)  Resp: 18 (05/28/17 0729)  BP: 119/76 (05/28/17 0729)  SpO2: 96 % (05/28/17 0732)    24-Hour Vitals Range:  Temp:  [98 °F (36.7 °C)-98.7 °F (37.1 °C)]   Pulse:  [80-97]   Resp:  [16-18]   BP: (113-122)/(60-78)   SpO2:  [93 %-100 %]     24-Hour I&O:    Intake/Output Summary (Last 24 hours) at 05/28/17 1003  Last data filed at 05/28/17 0605   Gross per 24 hour   Intake          3581.66 ml   Output              875 ml   Net          2706.66 ml       PHYSICAL EXAM:  AAO, NAD, well developed and well nourished  Head normocephalic, atraumatic  Trachea midline, neck supple  Respirations unlabored with good inspiratory effort  Heart regular rate and rhythm  Abdomen soft, aquacel packing in R flank and RLQ wound, nondistended, appropriately TTP    LAB RESULTS:    Recent Labs  Lab 05/27/17  0434 05/28/17  0541   WBC 15.50* 19.59*   HGB 9.6* 8.6*   HCT 31.1* 27.3*   * 405*       Recent Labs  Lab 05/27/17  0434 05/28/17  0541    137   K 4.3 3.6    98   CO2 26 31*   BUN 6 11   CREATININE 0.7 0.8   GLU 99 122*   CALCIUM 8.2* 8.7          ASSESSMENT:     Pedro Nelson is a 41 y.o. male who is now 2 Days Post-Op s/p Procedure(s):  ILEOCOLECTOMY open (N/A)  CATHETERIZATION-URETERAL (Bilateral)  EXPLORATORY-LAPAROTOMY (N/A)  LYSIS-ADHESION (N/A)  RESECTION - SMALL BOWEL  ANASTOMOSIS-INTESTINAL  REPAIR-FISTULA-ENTEROCUTANEOUS (N/A)  WRAP-OMENTAL (N/A)    PLAN:  · CLD  · Continue current pain regimen  · Daily dressing changes to R flank and RLQ wound  · Trend daily labs.  · OOB/ambulate.  · Prophylaxis:  Mechanical DVT ppx       Geovany Costello MD

## 2017-05-28 NOTE — PLAN OF CARE
Problem: Patient Care Overview  Goal: Plan of Care Review  Plan of care reviewed with pt verbalizing understanding. AAOx4 with VSS  Midline incision CDI with dermabond POLI. Right lateral and RLQ abdomin incisions covered with gauze and tape. Main complaint is discomfort from acid reflux (gas) and nausea. PRN nausea and pain meds used to treat pt symptoms. Spoke to MD this morning for possible tums to help with discomfort. Pt more ambulatory with no falls or injury noted. Tolerating CL diet with some nausea noted and medicated for. Call bell within reach. WCTM

## 2017-05-29 LAB
ABO + RH BLD: NORMAL
ANION GAP SERPL CALC-SCNC: 7 MMOL/L
BASOPHILS # BLD AUTO: 0.01 K/UL
BASOPHILS # BLD AUTO: 0.02 K/UL
BASOPHILS NFR BLD: 0.1 %
BASOPHILS NFR BLD: 0.1 %
BLD GP AB SCN CELLS X3 SERPL QL: NORMAL
BLD PROD TYP BPU: NORMAL
BLD PROD TYP BPU: NORMAL
BLOOD UNIT EXPIRATION DATE: NORMAL
BLOOD UNIT EXPIRATION DATE: NORMAL
BLOOD UNIT TYPE CODE: 1700
BLOOD UNIT TYPE CODE: 1700
BLOOD UNIT TYPE: NORMAL
BLOOD UNIT TYPE: NORMAL
BUN SERPL-MCNC: 11 MG/DL
CALCIUM SERPL-MCNC: 8.1 MG/DL
CHLORIDE SERPL-SCNC: 103 MMOL/L
CO2 SERPL-SCNC: 26 MMOL/L
CODING SYSTEM: NORMAL
CODING SYSTEM: NORMAL
CREAT SERPL-MCNC: 0.7 MG/DL
DIFFERENTIAL METHOD: ABNORMAL
DIFFERENTIAL METHOD: ABNORMAL
DISPENSE STATUS: NORMAL
DISPENSE STATUS: NORMAL
EOSINOPHIL # BLD AUTO: 0.1 K/UL
EOSINOPHIL # BLD AUTO: 0.2 K/UL
EOSINOPHIL NFR BLD: 0.4 %
EOSINOPHIL NFR BLD: 1.5 %
ERYTHROCYTE [DISTWIDTH] IN BLOOD BY AUTOMATED COUNT: 16 %
ERYTHROCYTE [DISTWIDTH] IN BLOOD BY AUTOMATED COUNT: 16.5 %
EST. GFR  (AFRICAN AMERICAN): >60 ML/MIN/1.73 M^2
EST. GFR  (NON AFRICAN AMERICAN): >60 ML/MIN/1.73 M^2
GLUCOSE SERPL-MCNC: 88 MG/DL
HCT VFR BLD AUTO: 21.5 %
HCT VFR BLD AUTO: 22.1 %
HCT VFR BLD AUTO: 24.7 %
HGB BLD-MCNC: 6.6 G/DL
HGB BLD-MCNC: 6.8 G/DL
HGB BLD-MCNC: 7.7 G/DL
LYMPHOCYTES # BLD AUTO: 0.4 K/UL
LYMPHOCYTES # BLD AUTO: 1.1 K/UL
LYMPHOCYTES NFR BLD: 3.4 %
LYMPHOCYTES NFR BLD: 7.2 %
MCH RBC QN AUTO: 24.8 PG
MCH RBC QN AUTO: 25.2 PG
MCHC RBC AUTO-ENTMCNC: 30.7 %
MCHC RBC AUTO-ENTMCNC: 31.2 %
MCV RBC AUTO: 81 FL
MCV RBC AUTO: 81 FL
MONOCYTES # BLD AUTO: 0.4 K/UL
MONOCYTES # BLD AUTO: 1.1 K/UL
MONOCYTES NFR BLD: 3.2 %
MONOCYTES NFR BLD: 7 %
NEUTROPHILS # BLD AUTO: 11.8 K/UL
NEUTROPHILS # BLD AUTO: 12.7 K/UL
NEUTROPHILS NFR BLD: 83.9 %
NEUTROPHILS NFR BLD: 92.5 %
PLATELET # BLD AUTO: 229 K/UL
PLATELET # BLD AUTO: 308 K/UL
PMV BLD AUTO: 8.7 FL
PMV BLD AUTO: 9.1 FL
POTASSIUM SERPL-SCNC: 4.6 MMOL/L
RBC # BLD AUTO: 2.66 M/UL
RBC # BLD AUTO: 3.05 M/UL
SODIUM SERPL-SCNC: 136 MMOL/L
TRANS ERYTHROCYTES VOL PATIENT: NORMAL ML
TRANS ERYTHROCYTES VOL PATIENT: NORMAL ML
WBC # BLD AUTO: 12.74 K/UL
WBC # BLD AUTO: 15.08 K/UL

## 2017-05-29 PROCEDURE — 25000003 PHARM REV CODE 250: Performed by: SURGERY

## 2017-05-29 PROCEDURE — 36430 TRANSFUSION BLD/BLD COMPNT: CPT

## 2017-05-29 PROCEDURE — 20600001 HC STEP DOWN PRIVATE ROOM

## 2017-05-29 PROCEDURE — C9113 INJ PANTOPRAZOLE SODIUM, VIA: HCPCS | Performed by: STUDENT IN AN ORGANIZED HEALTH CARE EDUCATION/TRAINING PROGRAM

## 2017-05-29 PROCEDURE — 80048 BASIC METABOLIC PNL TOTAL CA: CPT

## 2017-05-29 PROCEDURE — 86078 PHYS BLOOD BANK SERV REACTJ: CPT | Mod: ,,, | Performed by: PATHOLOGY

## 2017-05-29 PROCEDURE — P9021 RED BLOOD CELLS UNIT: HCPCS

## 2017-05-29 PROCEDURE — 86900 BLOOD TYPING SEROLOGIC ABO: CPT

## 2017-05-29 PROCEDURE — 63600175 PHARM REV CODE 636 W HCPCS: Performed by: COLON & RECTAL SURGERY

## 2017-05-29 PROCEDURE — 36415 COLL VENOUS BLD VENIPUNCTURE: CPT

## 2017-05-29 PROCEDURE — 63600175 PHARM REV CODE 636 W HCPCS: Performed by: STUDENT IN AN ORGANIZED HEALTH CARE EDUCATION/TRAINING PROGRAM

## 2017-05-29 PROCEDURE — 63600175 PHARM REV CODE 636 W HCPCS: Performed by: SURGERY

## 2017-05-29 PROCEDURE — 86850 RBC ANTIBODY SCREEN: CPT

## 2017-05-29 PROCEDURE — 85014 HEMATOCRIT: CPT

## 2017-05-29 PROCEDURE — 85025 COMPLETE CBC W/AUTO DIFF WBC: CPT

## 2017-05-29 PROCEDURE — 86920 COMPATIBILITY TEST SPIN: CPT

## 2017-05-29 PROCEDURE — 85018 HEMOGLOBIN: CPT

## 2017-05-29 PROCEDURE — 25000003 PHARM REV CODE 250: Performed by: NURSE PRACTITIONER

## 2017-05-29 RX ORDER — HYDROCODONE BITARTRATE AND ACETAMINOPHEN 500; 5 MG/1; MG/1
TABLET ORAL
Status: DISCONTINUED | OUTPATIENT
Start: 2017-05-29 | End: 2017-06-03 | Stop reason: HOSPADM

## 2017-05-29 RX ORDER — DIPHENHYDRAMINE HCL 25 MG
25 CAPSULE ORAL ONCE
Status: COMPLETED | OUTPATIENT
Start: 2017-05-29 | End: 2017-05-29

## 2017-05-29 RX ORDER — CALCIUM CARBONATE 200(500)MG
500 TABLET,CHEWABLE ORAL 2 TIMES DAILY PRN
Status: DISCONTINUED | OUTPATIENT
Start: 2017-05-29 | End: 2017-05-30

## 2017-05-29 RX ADMIN — HYDROMORPHONE HYDROCHLORIDE 1 MG: 1 INJECTION, SOLUTION INTRAMUSCULAR; INTRAVENOUS; SUBCUTANEOUS at 03:05

## 2017-05-29 RX ADMIN — ALVIMOPAN 12 MG: 12 CAPSULE ORAL at 09:05

## 2017-05-29 RX ADMIN — HYDROMORPHONE HYDROCHLORIDE 1 MG: 1 INJECTION, SOLUTION INTRAMUSCULAR; INTRAVENOUS; SUBCUTANEOUS at 12:05

## 2017-05-29 RX ADMIN — ENOXAPARIN SODIUM 40 MG: 100 INJECTION SUBCUTANEOUS at 05:05

## 2017-05-29 RX ADMIN — ONDANSETRON 4 MG: 2 INJECTION INTRAMUSCULAR; INTRAVENOUS at 12:05

## 2017-05-29 RX ADMIN — HYDROCODONE BITARTRATE AND ACETAMINOPHEN 1 TABLET: 5; 325 TABLET ORAL at 01:05

## 2017-05-29 RX ADMIN — HYDROMORPHONE HYDROCHLORIDE 1 MG: 1 INJECTION, SOLUTION INTRAMUSCULAR; INTRAVENOUS; SUBCUTANEOUS at 08:05

## 2017-05-29 RX ADMIN — DIPHENHYDRAMINE HYDROCHLORIDE 25 MG: 25 CAPSULE ORAL at 04:05

## 2017-05-29 RX ADMIN — HYDROMORPHONE HYDROCHLORIDE 1 MG: 1 INJECTION, SOLUTION INTRAMUSCULAR; INTRAVENOUS; SUBCUTANEOUS at 05:05

## 2017-05-29 RX ADMIN — HYDROMORPHONE HYDROCHLORIDE 1 MG: 1 INJECTION, SOLUTION INTRAMUSCULAR; INTRAVENOUS; SUBCUTANEOUS at 01:05

## 2017-05-29 RX ADMIN — IBUPROFEN 800 MG: 800 INJECTION INTRAVENOUS at 04:05

## 2017-05-29 RX ADMIN — HYDROCODONE BITARTRATE AND ACETAMINOPHEN 1 TABLET: 5; 325 TABLET ORAL at 09:05

## 2017-05-29 RX ADMIN — CALCIUM CARBONATE (ANTACID) CHEW TAB 500 MG 500 MG: 500 CHEW TAB at 05:05

## 2017-05-29 RX ADMIN — HYDROMORPHONE HYDROCHLORIDE 1 MG: 1 INJECTION, SOLUTION INTRAMUSCULAR; INTRAVENOUS; SUBCUTANEOUS at 06:05

## 2017-05-29 RX ADMIN — HYDROCODONE BITARTRATE AND ACETAMINOPHEN 1 TABLET: 5; 325 TABLET ORAL at 02:05

## 2017-05-29 RX ADMIN — SODIUM CHLORIDE, SODIUM LACTATE, POTASSIUM CHLORIDE, AND CALCIUM CHLORIDE: .6; .31; .03; .02 INJECTION, SOLUTION INTRAVENOUS at 04:05

## 2017-05-29 RX ADMIN — IBUPROFEN 800 MG: 800 INJECTION INTRAVENOUS at 05:05

## 2017-05-29 RX ADMIN — IBUPROFEN 800 MG: 800 INJECTION INTRAVENOUS at 09:05

## 2017-05-29 RX ADMIN — PANTOPRAZOLE SODIUM 40 MG: 40 INJECTION, POWDER, FOR SOLUTION INTRAVENOUS at 08:05

## 2017-05-29 RX ADMIN — HYDROMORPHONE HYDROCHLORIDE 1 MG: 1 INJECTION, SOLUTION INTRAMUSCULAR; INTRAVENOUS; SUBCUTANEOUS at 09:05

## 2017-05-29 RX ADMIN — ONDANSETRON 4 MG: 2 INJECTION INTRAMUSCULAR; INTRAVENOUS at 01:05

## 2017-05-29 NOTE — PLAN OF CARE
Problem: Patient Care Overview  Goal: Plan of Care Review  Outcome: Ongoing (interventions implemented as appropriate)  Plan of care discussed with pt. Pt verbalizes understanding. Pt only able to drink water this shift. Pt complains of acid reflux after drinking other clear liquids. Pain managed with PRN lortab and dilaudid. Pt up ad chelle. Pt positions independently. Vital signs currently stable on RA. Pt remains free of falls. Will continue to monitor.

## 2017-05-29 NOTE — PROGRESS NOTES
Pt reports trying to get out of bed then started having chills. Pt is visibly shaking and reports feeling cold at 1515. Vitals taken. Temp of 98.5. Pulse of 145. Michelle Ng NP notified. Order okay to continue blood. Blood infusion stopped at 1545. Temp of 102 noted. ASHLEY Ng notified. Order for blood transfusion reaction panel. Blood and urine samples obtained. Blood and blood tubing delivered to blood bank. Benadryl given to pt as ordered. Will continue to monitor.

## 2017-05-29 NOTE — PROGRESS NOTES
COLON RECTAL SURGERY  PROGRESS NOTE    LENGTH OF STAY: 3  POST OPERATIVE DAY: 3 Days Post-Op for Procedure(s) (LRB):  ILEOCOLECTOMY open (N/A)  CATHETERIZATION-URETERAL (Bilateral)  EXPLORATORY-LAPAROTOMY (N/A)  LYSIS-ADHESION (N/A)  RESECTION - SMALL BOWEL  ANASTOMOSIS-INTESTINAL  REPAIR-FISTULA-ENTEROCUTANEOUS (N/A)  WRAP-OMENTAL (N/A)     Subjective     Daily HPI: No acute events overnight, pain controlled, no flatus or BMs, no nausea or vomiting, reports heartburn.      Objective     Vitals:    05/29/17 0949   BP: (!) 112/54   Pulse: 84   Resp: 18   Temp: 98.2 °F (36.8 °C)         Intake/Output Summary (Last 24 hours) at 05/29/17 1006  Last data filed at 05/29/17 0536   Gross per 24 hour   Intake          3501.66 ml   Output             1425 ml   Net          2076.66 ml       General:  male in nad  Neuro: AAO x4 MAEx4 PERRL  Chest: resps even unlabored, cap refill <2 sec  Abd: soft, nondistended, tenderness mild in the lower abdomen, without guarding and without rebound  Wound (if present): wound margins intact and healing well.  No signs of infection.  Stoma (if present): n/a    Recent Results (from the past 24 hour(s))   Basic metabolic panel    Collection Time: 05/29/17  5:25 AM   Result Value Ref Range    Sodium 136 136 - 145 mmol/L    Potassium 4.6 3.5 - 5.1 mmol/L    Chloride 103 95 - 110 mmol/L    CO2 26 23 - 29 mmol/L    Glucose 88 70 - 110 mg/dL    BUN, Bld 11 6 - 20 mg/dL    Creatinine 0.7 0.5 - 1.4 mg/dL    Calcium 8.1 (L) 8.7 - 10.5 mg/dL    Anion Gap 7 (L) 8 - 16 mmol/L    eGFR if African American >60.0 >60 mL/min/1.73 m^2    eGFR if non African American >60.0 >60 mL/min/1.73 m^2   CBC auto differential    Collection Time: 05/29/17  5:25 AM   Result Value Ref Range    WBC 15.08 (H) 3.90 - 12.70 K/uL    RBC 2.66 (L) 4.60 - 6.20 M/uL    Hemoglobin 6.6 (L) 14.0 - 18.0 g/dL    Hematocrit 21.5 (L) 40.0 - 54.0 %    MCV 81 (L) 82 - 98 fL    MCH 24.8 (L) 27.0 - 31.0 pg    MCHC 30.7 (L) 32.0 - 36.0  %    RDW 16.5 (H) 11.5 - 14.5 %    Platelets 308 150 - 350 K/uL    MPV 8.7 (L) 9.2 - 12.9 fL    Gran # 12.7 (H) 1.8 - 7.7 K/uL    Lymph # 1.1 1.0 - 4.8 K/uL    Mono # 1.1 (H) 0.3 - 1.0 K/uL    Eos # 0.2 0.0 - 0.5 K/uL    Baso # 0.02 0.00 - 0.20 K/uL    Gran% 83.9 (H) 38.0 - 73.0 %    Lymph% 7.2 (L) 18.0 - 48.0 %    Mono% 7.0 4.0 - 15.0 %    Eosinophil% 1.5 0.0 - 8.0 %    Basophil% 0.1 0.0 - 1.9 %    Differential Method Automated    Hemoglobin    Collection Time: 05/29/17  8:07 AM   Result Value Ref Range    Hemoglobin 6.8 (L) 14.0 - 18.0 g/dL   Hematocrit    Collection Time: 05/29/17  8:07 AM   Result Value Ref Range    Hematocrit 22.1 (L) 40.0 - 54.0 %   Type & Screen    Collection Time: 05/29/17  8:07 AM   Result Value Ref Range    Group & Rh AB NEG     Indirect Jeremy NEG    Prepare RBC 2 Units; pending transfusion    Collection Time: 05/29/17  8:07 AM   Result Value Ref Range    UNIT NUMBER [id number]     PRODUCT CODE N3807E73     DISPENSE STATUS CROSSMATCHED     CODING SYSTEM KZDP159     Unit Blood Type Code 1700     Unit Blood Type B NEG     Unit Expiration [id number]     UNIT NUMBER [id number]     PRODUCT CODE L6461W12     DISPENSE STATUS ISSUED     CODING SYSTEM ZEQK267     Unit Blood Type Code 1700     Unit Blood Type B NEG     Unit Expiration [id number]        Assessment and Plan       Pedro Nelson is a 41 y.o. male who is now 3 Days Post-Op s/p Procedure(s):  ILEOCOLECTOMY open (N/A)  CATHETERIZATION-URETERAL (Bilateral)  EXPLORATORY-LAPAROTOMY (N/A)  LYSIS-ADHESION (N/A)  RESECTION - SMALL BOWEL  ANASTOMOSIS-INTESTINAL  REPAIR-FISTULA-ENTEROCUTANEOUS (N/A)  WRAP-OMENTAL (N/A)     PLAN:  · CLD to continue   · Continue current pain regimen  · Daily dressing changes to R flank and RLQ wound  · WBC decreased today  · H/H decreased today 6/21, no evidence of active bleeding, will transfuse 2 units this AM  · OOB/ambulate.  · Prophylaxis:  Mechanical DVT ppx     Jamaal Plasencia MD  Colon and  Rectal Surgery Fellow  [phone]

## 2017-05-29 NOTE — PLAN OF CARE
Pt is postop day #3, AA&Ox4, resting in bed.  Pt's wife resides in [place], however pt works & resides in [place], [place].     obtained assessment information from patient.  Educated pt on goals of the day, pt voiced understanding.  Anticipated discharge plan is for home.  CM will continue to follow.    Pharmacy of Choice -   [facility] - [place], [place] - [address]  [address]  [place] [address]  Phone: [phone] Fax: [phone]      Payor: BLUE CROSS BLUE SHIELD / Plan: BCBS ALL OUT OF STATE / Product Type: PPO /         05/29/17 0903   Discharge Assessment   Assessment Type Discharge Planning Assessment   Confirmed/corrected address and phone number on facesheet? Yes   Assessment information obtained from? Patient;Medical Record   Prior to hospitilization cognitive status: Alert/Oriented   Prior to hospitalization functional status: Independent   Current cognitive status: Alert/Oriented   Current Functional Status: Independent   Arrived From home or self-care   Lives With alone   Able to Return to Prior Arrangements yes   Is patient able to care for self after discharge? Yes   How many people do you have in your home that can help with your care after discharge? 0  (Pt's wife is in [place], pt works & resides in [place], [place])   Who are your caregiver(s) and their phone number(s)? Wife, Ella Nelson, [phone]   Patient's perception of discharge disposition home or selfcare   Readmission Within The Last 30 Days no previous admission in last 30 days   Patient currently being followed by outpatient case management? No   Patient currently receives home health services? No   Does the patient currently use HME? No   Patient currently receives private duty nursing? N/A   Patient currently receives any other outside agency services? No   Equipment Currently Used at Home none   Do you have any problems affording any of your prescribed medications? No   Is the patient taking  medications as prescribed? yes   Do you have any financial concerns preventing you from receiving the healthcare you need? No   Does the patient have transportation to healthcare appointments? Yes   Transportation Available family or friend will provide  (Pt will need advance notice to set up a ride home)   On Dialysis? No   Does the patient receive services at the Coumadin Clinic? No   Discharge Plan A Home   Discharge Plan B Home;Home Health   Patient/Family In Agreement With Plan yes

## 2017-05-29 NOTE — PROGRESS NOTES
At start of shift 2018 the night nurse emptied pt's urinal and it was very dark tea color.  At 0441 the same night nurse emptied pt's urinal and the color was clear light yellow.  The pt reported a strange sound when he urinated and it he spurted when he urinated the last time.

## 2017-05-29 NOTE — PROGRESS NOTES
Asked to see for abscess to right abdomen and right flank     05/29/17 1100       Incision/Site 05/26/17 1215 Right back lateral   Date First Assessed/Time First Assessed: 05/26/17 1215   Present Prior to Hospital Arrival?: No  Side: Right  Location: back  Orientation: lateral   Incision WDL ex   Dressing Appearance intact;moist drainage   Appearance pink;moist  (unable to visualize base)   Periwound Area normal skin tone   Drainage Characteristics/Odor tan   Wound Length (cm) 1   Wound Width (cm) 1   Depth (cm) 5   Cleansed W/ sterile normal saline   Irrigated W/ sterile normal saline   Dressing hydrofiber;telfa island dressing       Incision/Site 05/26/17 1215 Right abdomen anterior   Date First Assessed/Time First Assessed: 05/26/17 1215   Present Prior to Hospital Arrival?: No  Side: Right  Location: abdomen  Orientation: anterior   Incision WDL ex   Dressing Appearance moist drainage;intact   Appearance clean;moist;pink   Periwound Area normal skin tone   Drainage Characteristics/Odor serous   Drainage Amount small   Wound Length (cm) 0.3   Wound Width (cm) 1.4   Depth (cm) 3.2   Wound Edges open   Cleansed W/ sterile normal saline   Irrigated W/ sterile normal saline   Dressing hydrofiber;telfa island dressing     Recommendations;  1. Clean and irrigate wounds with saline  2. Wick wounds with Aquacel AG ribbon and cover with mepore dressing on F  Discussed recommendations with Dr Jamaal Cadena RN,CWON  [phone]

## 2017-05-30 LAB
ANION GAP SERPL CALC-SCNC: 9 MMOL/L
BASOPHILS # BLD AUTO: 0.02 K/UL
BASOPHILS NFR BLD: 0.2 %
BUN SERPL-MCNC: 12 MG/DL
CALCIUM SERPL-MCNC: 8.2 MG/DL
CHLORIDE SERPL-SCNC: 102 MMOL/L
CO2 SERPL-SCNC: 23 MMOL/L
CREAT SERPL-MCNC: 0.7 MG/DL
DIFFERENTIAL METHOD: ABNORMAL
EOSINOPHIL # BLD AUTO: 0.3 K/UL
EOSINOPHIL NFR BLD: 2.8 %
ERYTHROCYTE [DISTWIDTH] IN BLOOD BY AUTOMATED COUNT: 16.4 %
EST. GFR  (AFRICAN AMERICAN): >60 ML/MIN/1.73 M^2
EST. GFR  (NON AFRICAN AMERICAN): >60 ML/MIN/1.73 M^2
GLUCOSE SERPL-MCNC: 75 MG/DL
HCT VFR BLD AUTO: 24.8 %
HGB BLD-MCNC: 7.8 G/DL
LYMPHOCYTES # BLD AUTO: 1 K/UL
LYMPHOCYTES NFR BLD: 9.5 %
MCH RBC QN AUTO: 25.4 PG
MCHC RBC AUTO-ENTMCNC: 31.5 %
MCV RBC AUTO: 81 FL
MONOCYTES # BLD AUTO: 1 K/UL
MONOCYTES NFR BLD: 9.1 %
NEUTROPHILS # BLD AUTO: 8.4 K/UL
NEUTROPHILS NFR BLD: 78 %
PLATELET # BLD AUTO: 240 K/UL
PMV BLD AUTO: 9.1 FL
POTASSIUM SERPL-SCNC: 4.1 MMOL/L
RBC # BLD AUTO: 3.07 M/UL
SODIUM SERPL-SCNC: 134 MMOL/L
WBC # BLD AUTO: 10.7 K/UL

## 2017-05-30 PROCEDURE — 63600175 PHARM REV CODE 636 W HCPCS: Performed by: COLON & RECTAL SURGERY

## 2017-05-30 PROCEDURE — 25000003 PHARM REV CODE 250: Performed by: SURGERY

## 2017-05-30 PROCEDURE — C9113 INJ PANTOPRAZOLE SODIUM, VIA: HCPCS | Performed by: STUDENT IN AN ORGANIZED HEALTH CARE EDUCATION/TRAINING PROGRAM

## 2017-05-30 PROCEDURE — 20600001 HC STEP DOWN PRIVATE ROOM

## 2017-05-30 PROCEDURE — 63600175 PHARM REV CODE 636 W HCPCS: Performed by: STUDENT IN AN ORGANIZED HEALTH CARE EDUCATION/TRAINING PROGRAM

## 2017-05-30 PROCEDURE — 25000003 PHARM REV CODE 250: Performed by: NURSE PRACTITIONER

## 2017-05-30 PROCEDURE — 36415 COLL VENOUS BLD VENIPUNCTURE: CPT

## 2017-05-30 PROCEDURE — 80048 BASIC METABOLIC PNL TOTAL CA: CPT

## 2017-05-30 PROCEDURE — 85025 COMPLETE CBC W/AUTO DIFF WBC: CPT

## 2017-05-30 PROCEDURE — 63600175 PHARM REV CODE 636 W HCPCS: Performed by: SURGERY

## 2017-05-30 RX ORDER — CALCIUM CARBONATE 200(500)MG
500 TABLET,CHEWABLE ORAL 3 TIMES DAILY PRN
Status: DISCONTINUED | OUTPATIENT
Start: 2017-05-30 | End: 2017-06-03 | Stop reason: HOSPADM

## 2017-05-30 RX ADMIN — PANTOPRAZOLE SODIUM 40 MG: 40 INJECTION, POWDER, FOR SOLUTION INTRAVENOUS at 08:05

## 2017-05-30 RX ADMIN — HYDROMORPHONE HYDROCHLORIDE 1 MG: 1 INJECTION, SOLUTION INTRAMUSCULAR; INTRAVENOUS; SUBCUTANEOUS at 11:05

## 2017-05-30 RX ADMIN — HYDROMORPHONE HYDROCHLORIDE 1 MG: 1 INJECTION, SOLUTION INTRAMUSCULAR; INTRAVENOUS; SUBCUTANEOUS at 08:05

## 2017-05-30 RX ADMIN — HYDROCODONE BITARTRATE AND ACETAMINOPHEN 1 TABLET: 5; 325 TABLET ORAL at 04:05

## 2017-05-30 RX ADMIN — ENOXAPARIN SODIUM 40 MG: 100 INJECTION SUBCUTANEOUS at 04:05

## 2017-05-30 RX ADMIN — HYDROCODONE BITARTRATE AND ACETAMINOPHEN 1 TABLET: 5; 325 TABLET ORAL at 06:05

## 2017-05-30 RX ADMIN — ONDANSETRON 4 MG: 2 INJECTION INTRAMUSCULAR; INTRAVENOUS at 02:05

## 2017-05-30 RX ADMIN — CALCIUM CARBONATE (ANTACID) CHEW TAB 500 MG 500 MG: 500 CHEW TAB at 09:05

## 2017-05-30 RX ADMIN — IBUPROFEN 800 MG: 800 INJECTION INTRAVENOUS at 09:05

## 2017-05-30 RX ADMIN — HYDROMORPHONE HYDROCHLORIDE 1 MG: 1 INJECTION, SOLUTION INTRAMUSCULAR; INTRAVENOUS; SUBCUTANEOUS at 06:05

## 2017-05-30 RX ADMIN — IBUPROFEN 800 MG: 800 INJECTION INTRAVENOUS at 01:05

## 2017-05-30 RX ADMIN — HYDROMORPHONE HYDROCHLORIDE 1 MG: 1 INJECTION, SOLUTION INTRAMUSCULAR; INTRAVENOUS; SUBCUTANEOUS at 03:05

## 2017-05-30 RX ADMIN — HYDROCODONE BITARTRATE AND ACETAMINOPHEN 1 TABLET: 5; 325 TABLET ORAL at 09:05

## 2017-05-30 RX ADMIN — HYDROCODONE BITARTRATE AND ACETAMINOPHEN 1 TABLET: 5; 325 TABLET ORAL at 10:05

## 2017-05-30 RX ADMIN — ALVIMOPAN 12 MG: 12 CAPSULE ORAL at 09:05

## 2017-05-30 RX ADMIN — ONDANSETRON 4 MG: 2 INJECTION INTRAMUSCULAR; INTRAVENOUS at 09:05

## 2017-05-30 RX ADMIN — SODIUM CHLORIDE, SODIUM LACTATE, POTASSIUM CHLORIDE, AND CALCIUM CHLORIDE: .6; .31; .03; .02 INJECTION, SOLUTION INTRAVENOUS at 03:05

## 2017-05-30 RX ADMIN — ALVIMOPAN 12 MG: 12 CAPSULE ORAL at 08:05

## 2017-05-30 RX ADMIN — HYDROMORPHONE HYDROCHLORIDE 1 MG: 1 INJECTION, SOLUTION INTRAMUSCULAR; INTRAVENOUS; SUBCUTANEOUS at 12:05

## 2017-05-30 RX ADMIN — HYDROCODONE BITARTRATE AND ACETAMINOPHEN 1 TABLET: 5; 325 TABLET ORAL at 12:05

## 2017-05-30 RX ADMIN — CALCIUM CARBONATE (ANTACID) CHEW TAB 500 MG 500 MG: 500 CHEW TAB at 06:05

## 2017-05-30 RX ADMIN — IBUPROFEN 800 MG: 800 INJECTION INTRAVENOUS at 06:05

## 2017-05-30 RX ADMIN — SODIUM CHLORIDE, SODIUM LACTATE, POTASSIUM CHLORIDE, AND CALCIUM CHLORIDE: .6; .31; .03; .02 INJECTION, SOLUTION INTRAVENOUS at 01:05

## 2017-05-30 RX ADMIN — HYDROMORPHONE HYDROCHLORIDE 1 MG: 1 INJECTION, SOLUTION INTRAMUSCULAR; INTRAVENOUS; SUBCUTANEOUS at 02:05

## 2017-05-30 NOTE — PROGRESS NOTES
COLON RECTAL SURGERY  PROGRESS NOTE    LENGTH OF STAY: 4  POST OPERATIVE DAY: 4 Days Post-Op for Procedure(s) (LRB):  ILEOCOLECTOMY open (N/A)  CATHETERIZATION-URETERAL (Bilateral)  EXPLORATORY-LAPAROTOMY (N/A)  LYSIS-ADHESION (N/A)  RESECTION - SMALL BOWEL  ANASTOMOSIS-INTESTINAL  REPAIR-FISTULA-ENTEROCUTANEOUS (N/A)  WRAP-OMENTAL (N/A)     Subjective     Daily HPI: Became febrile and tachycardic with blood transfusion, which resolved after it was stopped.  Received a little over 1 unit PRBC.    No acute events overnight, pain controlled, no flatus or BMs, no nausea or vomiting, but is having some burping.  Reports heartburn, which is alleviated by TUMS.      Objective     Vitals:    05/30/17 0430   BP: (!) 101/56   Pulse: 70   Resp: 18   Temp: 97.9 °F (36.6 °C)         Intake/Output Summary (Last 24 hours) at 05/30/17 0739  Last data filed at 05/30/17 0622   Gross per 24 hour   Intake             4580 ml   Output             1875 ml   Net             2705 ml       General:  male in nad  Neuro: AAO x4 MAEx4 PERRL  Chest: resps even unlabored, cap refill <2 sec  Abd: soft, nondistended, tenderness mild in the lower abdomen, without guarding and without rebound  Wound (if present): wound margins intact and healing well.  No signs of infection.  Stoma (if present): n/a    Recent Results (from the past 24 hour(s))   Hemoglobin    Collection Time: 05/29/17  8:07 AM   Result Value Ref Range    Hemoglobin 6.8 (L) 14.0 - 18.0 g/dL   Hematocrit    Collection Time: 05/29/17  8:07 AM   Result Value Ref Range    Hematocrit 22.1 (L) 40.0 - 54.0 %   Type & Screen    Collection Time: 05/29/17  8:07 AM   Result Value Ref Range    Group & Rh AB NEG     Indirect Jeremy NEG    Prepare RBC 2 Units; pending transfusion    Collection Time: 05/29/17  8:07 AM   Result Value Ref Range    UNIT NUMBER [id number]     PRODUCT CODE P5835P89     DISPENSE STATUS TRANSFUSED     CODING SYSTEM COLS945     Unit Blood Type Code 1700      Unit Blood Type B NEG     Unit Expiration 201706012359     UNIT NUMBER [id number]     PRODUCT CODE F4018Y73     DISPENSE STATUS TRANSFUSED     CODING SYSTEM IMJY248     Unit Blood Type Code 1700     Unit Blood Type B NEG     Unit Expiration [id number]    CBC auto differential    Collection Time: 05/29/17  5:31 PM   Result Value Ref Range    WBC 12.74 (H) 3.90 - 12.70 K/uL    RBC 3.05 (L) 4.60 - 6.20 M/uL    Hemoglobin 7.7 (L) 14.0 - 18.0 g/dL    Hematocrit 24.7 (L) 40.0 - 54.0 %    MCV 81 (L) 82 - 98 fL    MCH 25.2 (L) 27.0 - 31.0 pg    MCHC 31.2 (L) 32.0 - 36.0 %    RDW 16.0 (H) 11.5 - 14.5 %    Platelets 229 150 - 350 K/uL    MPV 9.1 (L) 9.2 - 12.9 fL    Gran # 11.8 (H) 1.8 - 7.7 K/uL    Lymph # 0.4 (L) 1.0 - 4.8 K/uL    Mono # 0.4 0.3 - 1.0 K/uL    Eos # 0.1 0.0 - 0.5 K/uL    Baso # 0.01 0.00 - 0.20 K/uL    Gran% 92.5 (H) 38.0 - 73.0 %    Lymph% 3.4 (L) 18.0 - 48.0 %    Mono% 3.2 (L) 4.0 - 15.0 %    Eosinophil% 0.4 0.0 - 8.0 %    Basophil% 0.1 0.0 - 1.9 %    Differential Method Automated    Basic metabolic panel    Collection Time: 05/30/17  4:37 AM   Result Value Ref Range    Sodium 134 (L) 136 - 145 mmol/L    Potassium 4.1 3.5 - 5.1 mmol/L    Chloride 102 95 - 110 mmol/L    CO2 23 23 - 29 mmol/L    Glucose 75 70 - 110 mg/dL    BUN, Bld 12 6 - 20 mg/dL    Creatinine 0.7 0.5 - 1.4 mg/dL    Calcium 8.2 (L) 8.7 - 10.5 mg/dL    Anion Gap 9 8 - 16 mmol/L    eGFR if African American >60.0 >60 mL/min/1.73 m^2    eGFR if non African American >60.0 >60 mL/min/1.73 m^2   CBC auto differential    Collection Time: 05/30/17  4:37 AM   Result Value Ref Range    WBC 10.70 3.90 - 12.70 K/uL    RBC 3.07 (L) 4.60 - 6.20 M/uL    Hemoglobin 7.8 (L) 14.0 - 18.0 g/dL    Hematocrit 24.8 (L) 40.0 - 54.0 %    MCV 81 (L) 82 - 98 fL    MCH 25.4 (L) 27.0 - 31.0 pg    MCHC 31.5 (L) 32.0 - 36.0 %    RDW 16.4 (H) 11.5 - 14.5 %    Platelets 240 150 - 350 K/uL    MPV 9.1 (L) 9.2 - 12.9 fL    Gran # 8.4 (H) 1.8 - 7.7 K/uL    Lymph # 1.0 1.0  - 4.8 K/uL    Mono # 1.0 0.3 - 1.0 K/uL    Eos # 0.3 0.0 - 0.5 K/uL    Baso # 0.02 0.00 - 0.20 K/uL    Gran% 78.0 (H) 38.0 - 73.0 %    Lymph% 9.5 (L) 18.0 - 48.0 %    Mono% 9.1 4.0 - 15.0 %    Eosinophil% 2.8 0.0 - 8.0 %    Basophil% 0.2 0.0 - 1.9 %    Differential Method Automated        Assessment and Plan       Pedro Nelson is a 41 y.o. male who is now 3 Days Post-Op s/p Procedure(s):  ILEOCOLECTOMY open (N/A)  CATHETERIZATION-URETERAL (Bilateral)  EXPLORATORY-LAPAROTOMY (N/A)  LYSIS-ADHESION (N/A)  RESECTION - SMALL BOWEL  ANASTOMOSIS-INTESTINAL  REPAIR-FISTULA-ENTEROCUTANEOUS (N/A)  WRAP-OMENTAL (N/A)     PLAN:  · Back down to cips of clears  · MIVF until bowel function returns  · X ray abdomen flat and erect today  · Continue current pain regimen  · Daily dressing changes to R flank and RLQ wound  · WBC decreased today  · Monitor Hb, follow up on febrile reaction labs  · Protonix ppx and PRN TUMS for heartburn  · OOB/ambulate.  · Prophylaxis:  Pharm and Mechanical DVT ppx     Ángel Mcallister MD  General Surgery, PGY-3  [phone]

## 2017-05-30 NOTE — PHYSICIAN QUERY
" PT Name: Pedro Nelson  MR #: [id number]    Physician Query Form - Hematology Clarification      CDS/: Chelsey ROBLEDO, RN, CCDS             Contact information: [email or web address]    This form is a permanent document in the medical record.      Query Date: May 30, 2017    By submitting this query, we are merely seeking further clarification of documentation. Please utilize your independent clinical judgment when addressing the question(s) below.    The Medical record contains the following:   Indicators  Supporting Clinical Findings Location in Medical Record    "Anemia" documented      x H & H =  Hgb 9.6--> 6.6   Hct 31.1--> 21.5  Lab 5/27/17 --> 5/29/17    BP =                     HR=      "GI bleeding" documented      x Acute bleeding (Non GI site)  Estimated Blood Loss: 200 mL  Brief op note 5/26/17    x Transfusion(s)  Became febrile and tachycardic with blood transfusion, which resolved after it was stopped.  Received a little over 1 unit PRBC.  CRS Progress 5/30/17    Treatment:      Other:        Provider, please specify diagnosis or diagnoses associated with above clinical findings.    [ x ] Acute blood loss anemia expected post-operatively    [  ] Acute blood loss anemia    [  ] Anemia of chronic disease (Specify chronic disease)      [  ] Other (Specify) _______________________________     [  ] Clinically Undetermined     [  ] Other Hematological Diagnosis (please specify): _________________________________    [  ] Clinically Undetermined       Please document in your progress notes daily for the duration of treatment, until resolved, and include in your discharge summary.                                                                                                      "

## 2017-05-30 NOTE — PLAN OF CARE
Problem: Patient Care Overview  Goal: Plan of Care Review  Outcome: Ongoing (interventions implemented as appropriate)  Plan of care discussed with pt. Pt verbalizes understanding. Pt somewhat tolerating clear liquid diet. Pt complains of acid reflux after drinking clear liquids. Somewhat managed with PRN tums. Pain managed with PRN lortab and dilaudid. Pt up ad chelle. Pt encouraged to ambulate in halls. Pt ambulates in room independently. Vital signs stable on RA. Pt remains free of falls. Will continue to monitor.

## 2017-05-30 NOTE — PLAN OF CARE
Problem: Patient Care Overview  Goal: Plan of Care Review  Outcome: Ongoing (interventions implemented as appropriate)  Plan of care reviewed with patient whom verbalized understanding. Vital signs stable overnight. No acute events or falls during shift. Patient is AAOx4. No bowel movement overnight. No reports of gas per patient. Urine output adequate, though urine concentrated in color. Pain moderately controlled via PRN pain medications. Patient reported not being able to sleep due to inability to get comfortable in bed. Nausea managed via PRN medications. Not tolerating clear liquid diet due to reports of indigestion. Patient turned independently in bed throughout night. Call bell is within reach. Bed in lowest position. Will continue to monitor.

## 2017-05-30 NOTE — PLAN OF CARE
POD # 4.S/P Small bowel resection. KUB today to r/o ileus. Not medically ready for d/c.  D/C plan: d/c home with spouse. GINO/ADILSON will continue to follow. SAMY Agarwal RN/CM     05/30/17 2837   Right Care Assessment   Can the patient answer the patient profile reliably? Yes, cognitively intact   How often would a person be available to care for the patient? Whenever needed   Describe the patient's ability to walk at the present time. No restrictions   How does the patient rate their overall health at the present time? Fair   Number of comorbid conditions (as recorded on the chart) Two   During the past month, has the patient often been bothered by feeling down, depressed or hopeless? No   Have you felt down, depressed, or hopeless? 0   Have you felt little interest or pleasure in doing things? 0

## 2017-05-31 ENCOUNTER — TELEPHONE (OUTPATIENT)
Dept: GASTROENTEROLOGY | Facility: CLINIC | Age: 42
End: 2017-05-31

## 2017-05-31 LAB
ANION GAP SERPL CALC-SCNC: 7 MMOL/L
BASOPHILS # BLD AUTO: 0.02 K/UL
BASOPHILS NFR BLD: 0.2 %
BUN SERPL-MCNC: 12 MG/DL
CALCIUM SERPL-MCNC: 8.2 MG/DL
CHLORIDE SERPL-SCNC: 103 MMOL/L
CO2 SERPL-SCNC: 26 MMOL/L
CREAT SERPL-MCNC: 0.7 MG/DL
DIFFERENTIAL METHOD: ABNORMAL
EOSINOPHIL # BLD AUTO: 0.2 K/UL
EOSINOPHIL NFR BLD: 2.1 %
ERYTHROCYTE [DISTWIDTH] IN BLOOD BY AUTOMATED COUNT: 16.6 %
EST. GFR  (AFRICAN AMERICAN): >60 ML/MIN/1.73 M^2
EST. GFR  (NON AFRICAN AMERICAN): >60 ML/MIN/1.73 M^2
GLUCOSE SERPL-MCNC: 71 MG/DL
HCT VFR BLD AUTO: 25.2 %
HGB BLD-MCNC: 7.8 G/DL
LYMPHOCYTES # BLD AUTO: 1 K/UL
LYMPHOCYTES NFR BLD: 10.6 %
MCH RBC QN AUTO: 25.3 PG
MCHC RBC AUTO-ENTMCNC: 31 %
MCV RBC AUTO: 82 FL
MONOCYTES # BLD AUTO: 0.9 K/UL
MONOCYTES NFR BLD: 9.5 %
NEUTROPHILS # BLD AUTO: 7.1 K/UL
NEUTROPHILS NFR BLD: 77.2 %
PLATELET # BLD AUTO: 211 K/UL
PMV BLD AUTO: 8.8 FL
POTASSIUM SERPL-SCNC: 4.7 MMOL/L
RBC # BLD AUTO: 3.08 M/UL
SODIUM SERPL-SCNC: 136 MMOL/L
WBC # BLD AUTO: 9.17 K/UL

## 2017-05-31 PROCEDURE — 63600175 PHARM REV CODE 636 W HCPCS: Performed by: STUDENT IN AN ORGANIZED HEALTH CARE EDUCATION/TRAINING PROGRAM

## 2017-05-31 PROCEDURE — 25000003 PHARM REV CODE 250: Performed by: SURGERY

## 2017-05-31 PROCEDURE — 85025 COMPLETE CBC W/AUTO DIFF WBC: CPT

## 2017-05-31 PROCEDURE — C9113 INJ PANTOPRAZOLE SODIUM, VIA: HCPCS | Performed by: STUDENT IN AN ORGANIZED HEALTH CARE EDUCATION/TRAINING PROGRAM

## 2017-05-31 PROCEDURE — 80048 BASIC METABOLIC PNL TOTAL CA: CPT

## 2017-05-31 PROCEDURE — 63600175 PHARM REV CODE 636 W HCPCS: Performed by: COLON & RECTAL SURGERY

## 2017-05-31 PROCEDURE — 36415 COLL VENOUS BLD VENIPUNCTURE: CPT

## 2017-05-31 PROCEDURE — 97802 MEDICAL NUTRITION INDIV IN: CPT

## 2017-05-31 PROCEDURE — 20600001 HC STEP DOWN PRIVATE ROOM

## 2017-05-31 PROCEDURE — 63600175 PHARM REV CODE 636 W HCPCS: Performed by: SURGERY

## 2017-05-31 PROCEDURE — 25000003 PHARM REV CODE 250: Performed by: NURSE PRACTITIONER

## 2017-05-31 RX ADMIN — IBUPROFEN 800 MG: 800 INJECTION INTRAVENOUS at 06:05

## 2017-05-31 RX ADMIN — IBUPROFEN 800 MG: 800 INJECTION INTRAVENOUS at 09:05

## 2017-05-31 RX ADMIN — SODIUM CHLORIDE, SODIUM LACTATE, POTASSIUM CHLORIDE, AND CALCIUM CHLORIDE: .6; .31; .03; .02 INJECTION, SOLUTION INTRAVENOUS at 12:05

## 2017-05-31 RX ADMIN — HYDROCODONE BITARTRATE AND ACETAMINOPHEN 1 TABLET: 5; 325 TABLET ORAL at 06:05

## 2017-05-31 RX ADMIN — SODIUM CHLORIDE, SODIUM LACTATE, POTASSIUM CHLORIDE, AND CALCIUM CHLORIDE: .6; .31; .03; .02 INJECTION, SOLUTION INTRAVENOUS at 08:05

## 2017-05-31 RX ADMIN — ONDANSETRON 4 MG: 2 INJECTION INTRAMUSCULAR; INTRAVENOUS at 09:05

## 2017-05-31 RX ADMIN — ALVIMOPAN 12 MG: 12 CAPSULE ORAL at 08:05

## 2017-05-31 RX ADMIN — HYDROCODONE BITARTRATE AND ACETAMINOPHEN 1 TABLET: 5; 325 TABLET ORAL at 10:05

## 2017-05-31 RX ADMIN — SODIUM CHLORIDE, SODIUM LACTATE, POTASSIUM CHLORIDE, AND CALCIUM CHLORIDE: .6; .31; .03; .02 INJECTION, SOLUTION INTRAVENOUS at 06:05

## 2017-05-31 RX ADMIN — HYDROCODONE BITARTRATE AND ACETAMINOPHEN 1 TABLET: 5; 325 TABLET ORAL at 08:05

## 2017-05-31 RX ADMIN — PANTOPRAZOLE SODIUM 40 MG: 40 INJECTION, POWDER, FOR SOLUTION INTRAVENOUS at 08:05

## 2017-05-31 RX ADMIN — CALCIUM CARBONATE (ANTACID) CHEW TAB 500 MG 500 MG: 500 CHEW TAB at 08:05

## 2017-05-31 RX ADMIN — HYDROMORPHONE HYDROCHLORIDE 1 MG: 1 INJECTION, SOLUTION INTRAMUSCULAR; INTRAVENOUS; SUBCUTANEOUS at 09:05

## 2017-05-31 RX ADMIN — HYDROMORPHONE HYDROCHLORIDE 1 MG: 1 INJECTION, SOLUTION INTRAMUSCULAR; INTRAVENOUS; SUBCUTANEOUS at 08:05

## 2017-05-31 RX ADMIN — CALCIUM CARBONATE (ANTACID) CHEW TAB 500 MG 500 MG: 500 CHEW TAB at 06:05

## 2017-05-31 RX ADMIN — IBUPROFEN 800 MG: 800 INJECTION INTRAVENOUS at 01:05

## 2017-05-31 RX ADMIN — ENOXAPARIN SODIUM 40 MG: 100 INJECTION SUBCUTANEOUS at 04:05

## 2017-05-31 RX ADMIN — HYDROMORPHONE HYDROCHLORIDE 1 MG: 1 INJECTION, SOLUTION INTRAMUSCULAR; INTRAVENOUS; SUBCUTANEOUS at 04:05

## 2017-05-31 RX ADMIN — HYDROCODONE BITARTRATE AND ACETAMINOPHEN 1 TABLET: 5; 325 TABLET ORAL at 03:05

## 2017-05-31 RX ADMIN — HYDROCODONE BITARTRATE AND ACETAMINOPHEN 1 TABLET: 5; 325 TABLET ORAL at 02:05

## 2017-05-31 NOTE — PLAN OF CARE
Problem: Patient Care Overview  Goal: Plan of Care Review  Outcome: Ongoing (interventions implemented as appropriate)  Pt remained free from injury during shift. VSS. Pt incision remains CDI no redness or drainage noted. Dressings to RLQ and rt flank changed per wound care orders. No odor noted on packing. Pt transitioned to full liquid diet. Tolerating with no reports of emesis or nausea. Pain controlled with PO percocet and IV dilaudid for BTP. Pt went for xray of abdomen today. LR continues to infuse @ 100ml/hr. Pt ambulating with no issues. Using IS frequently. Appetite good. Call light in reach. Will continue to monitor.

## 2017-05-31 NOTE — TELEPHONE ENCOUNTER
Called pt twice, no answer, both times line just rang then line went dead.    When I went to go chart message I saw pt was in hospital

## 2017-05-31 NOTE — PLAN OF CARE
Problem: Patient Care Overview  Goal: Plan of Care Review  Outcome: Ongoing (interventions implemented as appropriate)  Plan of care reviewed with patient who verbalized understanding. Pt describes pain as better controlled with regular administration of PRN Lortab and use of Dilaudid for breakthrough pain. Pt ambulates frequently, independently  And without incident. Pt uses the incentive spirometer with encouragement.

## 2017-05-31 NOTE — PLAN OF CARE
Problem: Patient Care Overview  Goal: Plan of Care Review  Outcome: Ongoing (interventions implemented as appropriate)  Recommendations  1. As medically appropriate, ADAT to Low Fiber.   2. If unable to advance diet in 48 hours and TPN warranted, recommend Clinimix E 5/20 at 75 mL/hr with 250 mL 20% lipids - to provide 2084 kcal/day, 90 g protein/day, and 1800 mL fluid/day (GIR = 3.8 mg/kg/min).     RD to monitor. Full assessment completed. See RD Progress Note 5/31/17.

## 2017-05-31 NOTE — PROGRESS NOTES
[facility]  Adult Nutrition  Progress Note    SUMMARY     Recommendations  Recommendation/Intervention:   1. As medically appropriate, ADAT to Low Fiber.   2. If unable to advance diet in 48 hours and TPN warranted, recommend Clinimix E 5/20 at 75 mL/hr with 250 mL 20% lipids - to provide 2084 kcal/day, 90 g protein/day, and 1800 mL fluid/day (GIR = 3.8 mg/kg/min).   RD to monitor.    Goals: Patient to recevie nutrition within 48 hours  Nutrition Goal Status: new  Communication of RD Recs: discussed on rounds    Reason for Assessment  Reason for Assessment: NPO/clear liquids x 5 days  Diagnosis: gastrointestinal disease (Crohn's disease of ileum w/ fistula)  Relevent Medical History: -   Interdisciplinary Rounds: attended   General Information Comments: POD#5 s/p ileocolectomy, ex lap, JS, SBR, and EC fistula repair. Patient NPO/CL x 5 days.  Nutrition Discharge Planning: Unable to determine at this time.    Nutrition Prescription Ordered  Current Diet Order: CL     Evaluation of Received Nutrients/Fluid Intake  Comments: LBM 5/26. I/O noted, +14L since admit.      Nutrition Risk Screen   Nutrition Risk Screen: unintentional loss of 10 lbs or more in the past 2 mos    Nutrition/Diet History  Patient Reported Diet/Restrictions/Preferences: general  Typical Food/Fluid Intake: Patient having some issues with indigestion.  Food Preferences: No Sikh or cultural needs identified at this time.   Factors Affecting Nutritional Intake:  (CL diet)     Labs/Tests/Procedures/Meds   Pertinent Labs Reviewed: reviewed  Pertinent Labs Comments: Ca 8.2  Pertinent Medications Reviewed: reviewed  Pertinent Medications Comments: pantoprazole, IVF    Physical Findings  Overall Physical Appearance: underweight  Tubes:  (none)  Oral/Mouth Cavity: dental applicance present (specify)  Skin: incision    Anthropometrics   Height (inches): 75 in  Weight Method: Bed Scale   Ideal Body Weight (IBW), Male: 196 lb   %  Ideal Body Weight, Male (lb): 74.69 lb  BMI Grade: 17 - 18.4 protein-energy malnutrition grade I     Estimated/Assessed Needs  Weight Used For Calorie Calculations: 66.4 kg (146 lb 6.2 oz)     Energy Need Method: Yates-St Jeor (2081 kcal/day (1.25))  RMR (Yates-St. Jeor Equation): 1664.62   Weight Used For Protein Calculations: 66.4 kg (146 lb 6.2 oz)  Protein Requirements: 67-80 g/day (1.0-1.2 g/kg)  Fluid Need Method: RDA Method (1 mL/kcal or per MD)     Monitor and Evaluation  Food and Nutrient Intake: energy intake, food and beverage intake, parenteral nutrition intake  Food and Nutrient Adminstration: diet order, enteral and parenteral nutrition administration   Physical Activity and Function: nutrition-related ADLs and IADLs  Anthropometric Measurements: weight, weight change  Biochemical Data, Medical Tests and Procedures: electrolyte and renal panel, gastrointestinal profile, glucose/endocrine profile, lipid profile  Nutrition-Focused Physical Findings: overall appearance    Nutrition Risk  Level of Risk:  (2x/week)    Nutrition Follow-Up  RD Follow-up?: Yes    Nutrition Diagnosis  Problem: Inadequate energy intake  Etiology: decreased ability to consume sufficient energy  Signs/Symptoms: NPO with no alternate means of nutrition  Nutrition Diagnosis Status: New

## 2017-06-01 LAB
ANION GAP SERPL CALC-SCNC: 9 MMOL/L
BASOPHILS # BLD AUTO: 0.01 K/UL
BASOPHILS NFR BLD: 0.1 %
BUN SERPL-MCNC: 7 MG/DL
CALCIUM SERPL-MCNC: 7.6 MG/DL
CHLORIDE SERPL-SCNC: 103 MMOL/L
CO2 SERPL-SCNC: 24 MMOL/L
CREAT SERPL-MCNC: 0.6 MG/DL
DIFFERENTIAL METHOD: ABNORMAL
EOSINOPHIL # BLD AUTO: 0.1 K/UL
EOSINOPHIL NFR BLD: 1.7 %
ERYTHROCYTE [DISTWIDTH] IN BLOOD BY AUTOMATED COUNT: 16.7 %
EST. GFR  (AFRICAN AMERICAN): >60 ML/MIN/1.73 M^2
EST. GFR  (NON AFRICAN AMERICAN): >60 ML/MIN/1.73 M^2
GLUCOSE SERPL-MCNC: 86 MG/DL
HCT VFR BLD AUTO: 22.7 %
HGB BLD-MCNC: 7.2 G/DL
LYMPHOCYTES # BLD AUTO: 1 K/UL
LYMPHOCYTES NFR BLD: 12 %
MCH RBC QN AUTO: 25.7 PG
MCHC RBC AUTO-ENTMCNC: 31.7 %
MCV RBC AUTO: 81 FL
MONOCYTES # BLD AUTO: 1 K/UL
MONOCYTES NFR BLD: 12.1 %
NEUTROPHILS # BLD AUTO: 6 K/UL
NEUTROPHILS NFR BLD: 73.5 %
PLATELET # BLD AUTO: 227 K/UL
PMV BLD AUTO: 8.7 FL
POTASSIUM SERPL-SCNC: 3.7 MMOL/L
RBC # BLD AUTO: 2.8 M/UL
SODIUM SERPL-SCNC: 136 MMOL/L
WBC # BLD AUTO: 8.16 K/UL

## 2017-06-01 PROCEDURE — 25000003 PHARM REV CODE 250: Performed by: SURGERY

## 2017-06-01 PROCEDURE — 85025 COMPLETE CBC W/AUTO DIFF WBC: CPT

## 2017-06-01 PROCEDURE — 63600175 PHARM REV CODE 636 W HCPCS: Performed by: COLON & RECTAL SURGERY

## 2017-06-01 PROCEDURE — 25000003 PHARM REV CODE 250: Performed by: STUDENT IN AN ORGANIZED HEALTH CARE EDUCATION/TRAINING PROGRAM

## 2017-06-01 PROCEDURE — C9113 INJ PANTOPRAZOLE SODIUM, VIA: HCPCS | Performed by: STUDENT IN AN ORGANIZED HEALTH CARE EDUCATION/TRAINING PROGRAM

## 2017-06-01 PROCEDURE — 80048 BASIC METABOLIC PNL TOTAL CA: CPT

## 2017-06-01 PROCEDURE — 20600001 HC STEP DOWN PRIVATE ROOM

## 2017-06-01 PROCEDURE — 63600175 PHARM REV CODE 636 W HCPCS: Performed by: STUDENT IN AN ORGANIZED HEALTH CARE EDUCATION/TRAINING PROGRAM

## 2017-06-01 PROCEDURE — 25000003 PHARM REV CODE 250: Performed by: NURSE PRACTITIONER

## 2017-06-01 PROCEDURE — 36415 COLL VENOUS BLD VENIPUNCTURE: CPT

## 2017-06-01 PROCEDURE — 63600175 PHARM REV CODE 636 W HCPCS: Performed by: SURGERY

## 2017-06-01 RX ORDER — SIMETHICONE 80 MG
1 TABLET,CHEWABLE ORAL 3 TIMES DAILY PRN
Status: DISCONTINUED | OUTPATIENT
Start: 2017-06-01 | End: 2017-06-03 | Stop reason: HOSPADM

## 2017-06-01 RX ADMIN — HYDROCODONE BITARTRATE AND ACETAMINOPHEN 1 TABLET: 5; 325 TABLET ORAL at 10:06

## 2017-06-01 RX ADMIN — HYDROCODONE BITARTRATE AND ACETAMINOPHEN 1 TABLET: 5; 325 TABLET ORAL at 08:06

## 2017-06-01 RX ADMIN — HYDROMORPHONE HYDROCHLORIDE 1 MG: 1 INJECTION, SOLUTION INTRAMUSCULAR; INTRAVENOUS; SUBCUTANEOUS at 04:06

## 2017-06-01 RX ADMIN — HYDROCODONE BITARTRATE AND ACETAMINOPHEN 1 TABLET: 5; 325 TABLET ORAL at 12:06

## 2017-06-01 RX ADMIN — ONDANSETRON 4 MG: 2 INJECTION INTRAMUSCULAR; INTRAVENOUS at 12:06

## 2017-06-01 RX ADMIN — SODIUM CHLORIDE, SODIUM LACTATE, POTASSIUM CHLORIDE, AND CALCIUM CHLORIDE: .6; .31; .03; .02 INJECTION, SOLUTION INTRAVENOUS at 09:06

## 2017-06-01 RX ADMIN — HYDROMORPHONE HYDROCHLORIDE 1 MG: 1 INJECTION, SOLUTION INTRAMUSCULAR; INTRAVENOUS; SUBCUTANEOUS at 11:06

## 2017-06-01 RX ADMIN — CALCIUM CARBONATE (ANTACID) CHEW TAB 500 MG 500 MG: 500 CHEW TAB at 05:06

## 2017-06-01 RX ADMIN — HYDROMORPHONE HYDROCHLORIDE 1 MG: 1 INJECTION, SOLUTION INTRAMUSCULAR; INTRAVENOUS; SUBCUTANEOUS at 12:06

## 2017-06-01 RX ADMIN — ALVIMOPAN 12 MG: 12 CAPSULE ORAL at 08:06

## 2017-06-01 RX ADMIN — HYDROCODONE BITARTRATE AND ACETAMINOPHEN 1 TABLET: 5; 325 TABLET ORAL at 02:06

## 2017-06-01 RX ADMIN — SIMETHICONE CHEW TAB 80 MG 80 MG: 80 TABLET ORAL at 11:06

## 2017-06-01 RX ADMIN — IBUPROFEN 800 MG: 800 INJECTION INTRAVENOUS at 02:06

## 2017-06-01 RX ADMIN — HYDROCODONE BITARTRATE AND ACETAMINOPHEN 1 TABLET: 5; 325 TABLET ORAL at 05:06

## 2017-06-01 RX ADMIN — IBUPROFEN 800 MG: 800 INJECTION INTRAVENOUS at 05:06

## 2017-06-01 RX ADMIN — HYDROMORPHONE HYDROCHLORIDE 1 MG: 1 INJECTION, SOLUTION INTRAMUSCULAR; INTRAVENOUS; SUBCUTANEOUS at 05:06

## 2017-06-01 RX ADMIN — PANTOPRAZOLE SODIUM 40 MG: 40 INJECTION, POWDER, FOR SOLUTION INTRAVENOUS at 08:06

## 2017-06-01 RX ADMIN — IBUPROFEN 800 MG: 800 INJECTION INTRAVENOUS at 09:06

## 2017-06-01 RX ADMIN — SODIUM CHLORIDE, SODIUM LACTATE, POTASSIUM CHLORIDE, AND CALCIUM CHLORIDE: .6; .31; .03; .02 INJECTION, SOLUTION INTRAVENOUS at 05:06

## 2017-06-01 RX ADMIN — ENOXAPARIN SODIUM 40 MG: 100 INJECTION SUBCUTANEOUS at 05:06

## 2017-06-01 RX ADMIN — ONDANSETRON 4 MG: 2 INJECTION INTRAMUSCULAR; INTRAVENOUS at 08:06

## 2017-06-01 RX ADMIN — CALCIUM CARBONATE (ANTACID) CHEW TAB 500 MG 500 MG: 500 CHEW TAB at 02:06

## 2017-06-01 NOTE — PROGRESS NOTES
COLON RECTAL SURGERY  PROGRESS NOTE    LENGTH OF STAY: 6  POST OPERATIVE DAY: 6 Days Post-Op for Procedure(s) (LRB):  ILEOCOLECTOMY open (N/A)  CATHETERIZATION-URETERAL (Bilateral)  EXPLORATORY-LAPAROTOMY (N/A)  LYSIS-ADHESION (N/A)  RESECTION - SMALL BOWEL  ANASTOMOSIS-INTESTINAL  REPAIR-FISTULA-ENTEROCUTANEOUS (N/A)  WRAP-OMENTAL (N/A)     Subjective     Daily HPI: No acute events overnight, pain controlled, reports b/m with flatus.  Denies f/c/n/v. Ruth clears.  Ambulating.      Objective     Vitals:    06/01/17 1509   BP: 118/65   Pulse: 65   Resp: 16   Temp: 98.3 °F (36.8 °C)         Intake/Output Summary (Last 24 hours) at 06/01/17 1739  Last data filed at 06/01/17 1450   Gross per 24 hour   Intake          3998.34 ml   Output             1425 ml   Net          2573.34 ml       General:  male in nad  Neuro: AAO x4 MAEx4 PERRL  Chest: resps even unlabored, cap refill <2 sec  Abd: soft, nd, jerrica tpp  Wound (if present): wound margins intact and healing well.  Dressings to right anterior abdomen and right flank in place without saturation, seepage or surrounding erythema or induration  Stoma (if present): n/a     Recent Results (from the past 24 hour(s))   Basic metabolic panel    Collection Time: 06/01/17  4:51 AM   Result Value Ref Range    Sodium 136 136 - 145 mmol/L    Potassium 3.7 3.5 - 5.1 mmol/L    Chloride 103 95 - 110 mmol/L    CO2 24 23 - 29 mmol/L    Glucose 86 70 - 110 mg/dL    BUN, Bld 7 6 - 20 mg/dL    Creatinine 0.6 0.5 - 1.4 mg/dL    Calcium 7.6 (L) 8.7 - 10.5 mg/dL    Anion Gap 9 8 - 16 mmol/L    eGFR if African American >60.0 >60 mL/min/1.73 m^2    eGFR if non African American >60.0 >60 mL/min/1.73 m^2   CBC auto differential    Collection Time: 06/01/17  4:51 AM   Result Value Ref Range    WBC 8.16 3.90 - 12.70 K/uL    RBC 2.80 (L) 4.60 - 6.20 M/uL    Hemoglobin 7.2 (L) 14.0 - 18.0 g/dL    Hematocrit 22.7 (L) 40.0 - 54.0 %    MCV 81 (L) 82 - 98 fL    MCH 25.7 (L) 27.0 - 31.0 pg    MCHC  31.7 (L) 32.0 - 36.0 %    RDW 16.7 (H) 11.5 - 14.5 %    Platelets 227 150 - 350 K/uL    MPV 8.7 (L) 9.2 - 12.9 fL    Gran # 6.0 1.8 - 7.7 K/uL    Lymph # 1.0 1.0 - 4.8 K/uL    Mono # 1.0 0.3 - 1.0 K/uL    Eos # 0.1 0.0 - 0.5 K/uL    Baso # 0.01 0.00 - 0.20 K/uL    Gran% 73.5 (H) 38.0 - 73.0 %    Lymph% 12.0 (L) 18.0 - 48.0 %    Mono% 12.1 4.0 - 15.0 %    Eosinophil% 1.7 0.0 - 8.0 %    Basophil% 0.1 0.0 - 1.9 %    Differential Method Automated        Assessment and Plan     Pedro Nelson is a 41 y.o. male who is now 4 Days Post-Op s/p Procedure(s):  ILEOCOLECTOMY open (N/A)  CATHETERIZATION-URETERAL (Bilateral)  EXPLORATORY-LAPAROTOMY (N/A)  LYSIS-ADHESION (N/A)  RESECTION - SMALL BOWEL  ANASTOMOSIS-INTESTINAL  REPAIR-FISTULA-ENTEROCUTANEOUS (N/A)  WRAP-OMENTAL (N/A)     PLAN:  · Adv diet  · Continue mIVF  · Continue current pain regimen  · Dressing changes to R flank and RLQ wound as scheduled  · Protonix ppx and PRN TUMS for heartburn  · OOB/ambulate.  · Prophylaxis:  Pharm and Mechanical DVT ppx      Taras Guevara MD  Colorectal Surgery Fellow  [phone]

## 2017-06-01 NOTE — PROGRESS NOTES
COLON RECTAL SURGERY  PROGRESS NOTE    LENGTH OF STAY: 5  POST OPERATIVE DAY: 5 Days Post-Op for Procedure(s) (LRB):  ILEOCOLECTOMY open (N/A)  CATHETERIZATION-URETERAL (Bilateral)  EXPLORATORY-LAPAROTOMY (N/A)  LYSIS-ADHESION (N/A)  RESECTION - SMALL BOWEL  ANASTOMOSIS-INTESTINAL  REPAIR-FISTULA-ENTEROCUTANEOUS (N/A)  WRAP-OMENTAL (N/A)     Subjective     Daily HPI: No acute events overnight, pain controlled, no flatus or BMs.  Tolerating clears, wants more.  Ambulating.      Objective     Vitals:    05/31/17 1645   BP: 124/68   Pulse: 68   Resp: 16   Temp: 98.3 °F (36.8 °C)         Intake/Output Summary (Last 24 hours) at 05/31/17 1953  Last data filed at 05/31/17 1803   Gross per 24 hour   Intake          2958.33 ml   Output             2175 ml   Net           783.33 ml       General:  male in nad  Neuro: AAO x4 MAEx4 PERRL  Chest: resps even unlabored, cap refill <2 sec  Abd: soft, nondistended, tenderness mild in the entire abdomen, without guarding and without rebound  Wound (if present): wound margins intact and healing well.  No signs of infection.  Stoma (if present): n/a     Recent Results (from the past 24 hour(s))   Basic metabolic panel    Collection Time: 05/31/17  4:53 AM   Result Value Ref Range    Sodium 136 136 - 145 mmol/L    Potassium 4.7 3.5 - 5.1 mmol/L    Chloride 103 95 - 110 mmol/L    CO2 26 23 - 29 mmol/L    Glucose 71 70 - 110 mg/dL    BUN, Bld 12 6 - 20 mg/dL    Creatinine 0.7 0.5 - 1.4 mg/dL    Calcium 8.2 (L) 8.7 - 10.5 mg/dL    Anion Gap 7 (L) 8 - 16 mmol/L    eGFR if African American >60.0 >60 mL/min/1.73 m^2    eGFR if non African American >60.0 >60 mL/min/1.73 m^2   CBC auto differential    Collection Time: 05/31/17  4:54 AM   Result Value Ref Range    WBC 9.17 3.90 - 12.70 K/uL    RBC 3.08 (L) 4.60 - 6.20 M/uL    Hemoglobin 7.8 (L) 14.0 - 18.0 g/dL    Hematocrit 25.2 (L) 40.0 - 54.0 %    MCV 82 82 - 98 fL    MCH 25.3 (L) 27.0 - 31.0 pg    MCHC 31.0 (L) 32.0 - 36.0 %    RDW  16.6 (H) 11.5 - 14.5 %    Platelets 211 150 - 350 K/uL    MPV 8.8 (L) 9.2 - 12.9 fL    Gran # 7.1 1.8 - 7.7 K/uL    Lymph # 1.0 1.0 - 4.8 K/uL    Mono # 0.9 0.3 - 1.0 K/uL    Eos # 0.2 0.0 - 0.5 K/uL    Baso # 0.02 0.00 - 0.20 K/uL    Gran% 77.2 (H) 38.0 - 73.0 %    Lymph% 10.6 (L) 18.0 - 48.0 %    Mono% 9.5 4.0 - 15.0 %    Eosinophil% 2.1 0.0 - 8.0 %    Basophil% 0.2 0.0 - 1.9 %    Differential Method Automated        Assessment and Plan     Pedro Nelson is a 41 y.o. male who is now 4 Days Post-Op s/p Procedure(s):  ILEOCOLECTOMY open (N/A)  CATHETERIZATION-URETERAL (Bilateral)  EXPLORATORY-LAPAROTOMY (N/A)  LYSIS-ADHESION (N/A)  RESECTION - SMALL BOWEL  ANASTOMOSIS-INTESTINAL  REPAIR-FISTULA-ENTEROCUTANEOUS (N/A)  WRAP-OMENTAL (N/A)     PLAN:  · Awaiting bowel habits   · Full liquids today  · MIVF until bowel function returns  · X ray abdomen flat and erect today  · Continue current pain regimen  · Daily dressing changes to R flank and RLQ wound  · Protonix ppx and PRN TUMS for heartburn  · OOB/ambulate.  · Prophylaxis:  Pharm and Mechanical DVT ppx      Jamaal Plasencia MD  Colon and Rectal Surgery Fellow  [phone]

## 2017-06-01 NOTE — PROGRESS NOTES
Patient seen for reconsult for the right flank wound  Dressings removed and noted increased depth to both abdominal wounds with small pieces of hydrofiber placed summer the wounds. Needs to be effectively wicked with a cotton tip applicator to include base of wound and not just the surface.      06/01/17 1700       Incision/Site 05/26/17 1215 Right back lateral   Date First Assessed/Time First Assessed: 05/26/17 1215   Present Prior to Hospital Arrival?: No  Side: Right  Location: back  Orientation: lateral   Incision WDL ex   Dressing Appearance dry;intact   Appearance moist;pink   Periwound Area normal skin tone   Drainage Characteristics/Odor tan   Drainage Amount moderate   Wound Length (cm) 1   Wound Width (cm) 1   Depth (cm) 5.5  (feel nursing staff is not wicking into depth)   Wound Edges open   Cleansed W/ sterile normal saline   Irrigated W/ sterile normal saline   Dressing hydrofiber;telfa island dressing       Incision/Site 05/26/17 1215 Right abdomen anterior   Date First Assessed/Time First Assessed: 05/26/17 1215   Present Prior to Hospital Arrival?: No  Side: Right  Location: abdomen  Orientation: anterior   Incision WDL ex   Dressing Appearance dry;intact   Appearance clean;moist;pink   Periwound Area normal skin tone   Drainage Characteristics/Odor tan   Drainage Amount moderate   Wound Length (cm) 0.3   Wound Width (cm) 1.2   Depth (cm) 4  (nursing staff not wicking wound effectively)   Wound Edges open   Cleansed W/ sterile normal saline   Irrigated W/ sterile normal saline   Dressing hydrofiber;telfa island dressing     Discussed the importance of effectively wicking the wound with hydrofiber with his nurse Chery.  She will relay to the next shift . Adding an entry in the nursing communications.  Kelin Cadena RN,CWON  [phone]

## 2017-06-01 NOTE — PLAN OF CARE
Problem: Patient Care Overview  Goal: Plan of Care Review  Outcome: Ongoing (interventions implemented as appropriate)  Plan of care reviewed with patient who verbalized understanding. Pt Described pain as well controlled with infrequent use of prn IV pain medications. Pt ambulates frequently, independently and without incident. Pt has had a bowel movement. Pt uses the incentive spirometer with encouragement. Pt is tolerating advance in diet, but still eats very small portions.

## 2017-06-02 LAB
ANION GAP SERPL CALC-SCNC: 8 MMOL/L
BASOPHILS # BLD AUTO: 0.02 K/UL
BASOPHILS NFR BLD: 0.2 %
BUN SERPL-MCNC: 5 MG/DL
CALCIUM SERPL-MCNC: 7.7 MG/DL
CHLORIDE SERPL-SCNC: 102 MMOL/L
CO2 SERPL-SCNC: 28 MMOL/L
CREAT SERPL-MCNC: 0.7 MG/DL
DIFFERENTIAL METHOD: ABNORMAL
EOSINOPHIL # BLD AUTO: 0.1 K/UL
EOSINOPHIL NFR BLD: 1.2 %
ERYTHROCYTE [DISTWIDTH] IN BLOOD BY AUTOMATED COUNT: 17.1 %
EST. GFR  (AFRICAN AMERICAN): >60 ML/MIN/1.73 M^2
EST. GFR  (NON AFRICAN AMERICAN): >60 ML/MIN/1.73 M^2
GLUCOSE SERPL-MCNC: 92 MG/DL
HCT VFR BLD AUTO: 24 %
HGB BLD-MCNC: 7.5 G/DL
LYMPHOCYTES # BLD AUTO: 1.1 K/UL
LYMPHOCYTES NFR BLD: 10.8 %
MCH RBC QN AUTO: 25.4 PG
MCHC RBC AUTO-ENTMCNC: 31.3 %
MCV RBC AUTO: 81 FL
MONOCYTES # BLD AUTO: 0.9 K/UL
MONOCYTES NFR BLD: 8.6 %
NEUTROPHILS # BLD AUTO: 8.2 K/UL
NEUTROPHILS NFR BLD: 78.8 %
PLATELET # BLD AUTO: 227 K/UL
PMV BLD AUTO: 8.8 FL
POTASSIUM SERPL-SCNC: 3.9 MMOL/L
RBC # BLD AUTO: 2.95 M/UL
SODIUM SERPL-SCNC: 138 MMOL/L
WBC # BLD AUTO: 10.44 K/UL

## 2017-06-02 PROCEDURE — 63600175 PHARM REV CODE 636 W HCPCS: Performed by: NURSE PRACTITIONER

## 2017-06-02 PROCEDURE — 25000003 PHARM REV CODE 250: Performed by: NURSE PRACTITIONER

## 2017-06-02 PROCEDURE — 80048 BASIC METABOLIC PNL TOTAL CA: CPT

## 2017-06-02 PROCEDURE — 85025 COMPLETE CBC W/AUTO DIFF WBC: CPT

## 2017-06-02 PROCEDURE — 25000003 PHARM REV CODE 250: Performed by: SURGERY

## 2017-06-02 PROCEDURE — 97803 MED NUTRITION INDIV SUBSEQ: CPT

## 2017-06-02 PROCEDURE — C9113 INJ PANTOPRAZOLE SODIUM, VIA: HCPCS | Performed by: STUDENT IN AN ORGANIZED HEALTH CARE EDUCATION/TRAINING PROGRAM

## 2017-06-02 PROCEDURE — 25000003 PHARM REV CODE 250: Performed by: STUDENT IN AN ORGANIZED HEALTH CARE EDUCATION/TRAINING PROGRAM

## 2017-06-02 PROCEDURE — 36415 COLL VENOUS BLD VENIPUNCTURE: CPT

## 2017-06-02 PROCEDURE — 63600175 PHARM REV CODE 636 W HCPCS: Performed by: STUDENT IN AN ORGANIZED HEALTH CARE EDUCATION/TRAINING PROGRAM

## 2017-06-02 PROCEDURE — 20600001 HC STEP DOWN PRIVATE ROOM

## 2017-06-02 RX ADMIN — SODIUM FERRIC GLUCONATE COMPLEX 125 MG: 12.5 INJECTION INTRAVENOUS at 08:06

## 2017-06-02 RX ADMIN — IBUPROFEN 800 MG: 800 INJECTION INTRAVENOUS at 06:06

## 2017-06-02 RX ADMIN — ONDANSETRON 4 MG: 2 INJECTION INTRAMUSCULAR; INTRAVENOUS at 05:06

## 2017-06-02 RX ADMIN — ONDANSETRON 4 MG: 2 INJECTION INTRAMUSCULAR; INTRAVENOUS at 03:06

## 2017-06-02 RX ADMIN — SIMETHICONE CHEW TAB 80 MG 80 MG: 80 TABLET ORAL at 10:06

## 2017-06-02 RX ADMIN — HYDROCODONE BITARTRATE AND ACETAMINOPHEN 1 TABLET: 5; 325 TABLET ORAL at 09:06

## 2017-06-02 RX ADMIN — HYDROCODONE BITARTRATE AND ACETAMINOPHEN 1 TABLET: 5; 325 TABLET ORAL at 10:06

## 2017-06-02 RX ADMIN — HYDROMORPHONE HYDROCHLORIDE 1 MG: 1 INJECTION, SOLUTION INTRAMUSCULAR; INTRAVENOUS; SUBCUTANEOUS at 06:06

## 2017-06-02 RX ADMIN — IBUPROFEN 800 MG: 800 INJECTION INTRAVENOUS at 10:06

## 2017-06-02 RX ADMIN — HYDROCODONE BITARTRATE AND ACETAMINOPHEN 1 TABLET: 5; 325 TABLET ORAL at 04:06

## 2017-06-02 RX ADMIN — IBUPROFEN 800 MG: 800 INJECTION INTRAVENOUS at 01:06

## 2017-06-02 RX ADMIN — HYDROMORPHONE HYDROCHLORIDE 1 MG: 1 INJECTION, SOLUTION INTRAMUSCULAR; INTRAVENOUS; SUBCUTANEOUS at 11:06

## 2017-06-02 RX ADMIN — ONDANSETRON 4 MG: 2 INJECTION INTRAMUSCULAR; INTRAVENOUS at 11:06

## 2017-06-02 RX ADMIN — ALVIMOPAN 12 MG: 12 CAPSULE ORAL at 08:06

## 2017-06-02 RX ADMIN — CALCIUM CARBONATE (ANTACID) CHEW TAB 500 MG 500 MG: 500 CHEW TAB at 02:06

## 2017-06-02 RX ADMIN — HYDROCODONE BITARTRATE AND ACETAMINOPHEN 1 TABLET: 5; 325 TABLET ORAL at 03:06

## 2017-06-02 RX ADMIN — PANTOPRAZOLE SODIUM 40 MG: 40 INJECTION, POWDER, FOR SOLUTION INTRAVENOUS at 08:06

## 2017-06-02 RX ADMIN — SODIUM CHLORIDE, SODIUM LACTATE, POTASSIUM CHLORIDE, AND CALCIUM CHLORIDE: .6; .31; .03; .02 INJECTION, SOLUTION INTRAVENOUS at 10:06

## 2017-06-02 NOTE — PLAN OF CARE
Ramiro sent orders to Sania [facility]ER via [facility]. No other  needs identified at this time.      Ella Moody, RAMIRO [phone]

## 2017-06-02 NOTE — PLAN OF CARE
Sw met with Pt at the bedside to discuss d/c plan. Pt now would like [facility], but has no preference of what company to use. Pt discharging to his [place] address: [address] [place] [address] [place]. Ramiro sent referral via [facility] to Sania Maldonado, which is in [facility] network. Sw awaiting orders from team. Will send orders once completed.     Ella Moody, TASNEEM [phone]

## 2017-06-02 NOTE — PLAN OF CARE
Problem: Patient Care Overview  Goal: Plan of Care Review  Outcome: Ongoing (interventions implemented as appropriate)  POC reviewed with patient who verbalized understanding. AAOX4. Pt O2 sats stable on RA. Right FA IV intact and patent. Pt voiding via urinal with adequate output. Right abd and flank dressings changed. Pt tolerating diet well. Pt walked down ndiaye X1.Pt had a black medium BM today. MD notified.  Pain being controlled with PRN pain medication. Pt remained free from falls throughout the shift. VSS. Will continue to monitor.

## 2017-06-02 NOTE — PROGRESS NOTES
"[facility]  Adult Nutrition  Progress Note    SUMMARY     Recommendations  Recommendation/Intervention:   1. Encourage increased PO intake.   2. If PO intake decreases, recommend adding Boost Plus OS to all meals.   RD to monitor.    Goals: Patient to recevie nutrition within 48 hours  Nutrition Goal Status: goal met  Communication of RD Recs: discussed on rounds    Reason for Assessment  Reason for Assessment: RD follow-up  Diagnosis: gastrointestinal disease (Crohn's disease of ileum w/ fistula)  Interdisciplinary Rounds: attended   General Information Comments: POD#7 s/p ileocolectomy, ex lap, JS, SBR, and EC fistula repair. Patient advanced to Regular diet yesterday. Patient with good appetite and PO intake, consuming ~50-75% of meals.  Nutrition Discharge Planning: Adequate nutrition via PO intake.    Nutrition Prescription Ordered  Current Diet Order: Regular     Evaluation of Received Nutrients/Fluid Intake  Comments: LBM 5/31. I/O noted, +16L since admit.      Nutrition Risk Screen   Nutrition Risk Screen: unintentional loss of 10 lbs or more in the past 2 mos    Nutrition/Diet History  Patient Reported Diet/Restrictions/Preferences: general  Food Preferences: No Alevism or cultural needs identified at this time.   Factors Affecting Nutritional Intake:  (none)    Labs/Tests/Procedures/Meds   Pertinent Labs Reviewed: reviewed  Pertinent Labs Comments: Ca 7.7  Pertinent Medications Reviewed: reviewed  Pertinent Medications Comments: ferric gluconate, pantoprazole, IVF    Physical Findings  Overall Physical Appearance: underweight  Tubes:  (none)  Oral/Mouth Cavity: tooth/teeth missing  Skin: incision    Anthropometrics  Height: 6' 3" (190.5 cm)  Weight Method: Bed Scale  Weight: 66.4 kg (146 lb 6.2 oz)  Ideal Body Weight (IBW), Male: 196 lb   % Ideal Body Weight, Male (lb): 74.69 lb   BMI (Calculated): 18.3  BMI Grade: 17 - 18.4 protein-energy malnutrition grade I     Estimated/Assessed " Needs  Weight Used For Calorie Calculations: 66.4 kg (146 lb 6.2 oz)    Energy Need Method: [place]-St Jeor (2081 kcal/day (1.25))  RMR ([place]-St. Jeor Equation): 1664.62   Weight Used For Protein Calculations: 66.4 kg (146 lb 6.2 oz)  Protein Requirements: 67-80 g/day (1.0-1.2 g/kg)  Fluid Need Method: RDA Method (1 mL/kcal or per MD)     Monitor and Evaluation  Food and Nutrient Intake: energy intake, food and beverage intake  Food and Nutrient Adminstration: diet order  Physical Activity and Function: nutrition-related ADLs and IADLs  Anthropometric Measurements: weight, weight change  Biochemical Data, Medical Tests and Procedures: electrolyte and renal panel, gastrointestinal profile  Nutrition-Focused Physical Findings: overall appearance    Nutrition Risk  Level of Risk:  (1x/week)    Nutrition Follow-Up  RD Follow-up?: Yes    Nutrition Diagnosis  Problem: Inadequate energy intake  Etiology: decreased ability to consume sufficient energy  Signs/Symptoms: NPO with no alternate means of nutrition  Nutrition Diagnosis Status: Resolved

## 2017-06-02 NOTE — PLAN OF CARE
[facility]    HOME HEALTH ORDERS  FACE TO FACE ENCOUNTER    Patient Name: Pedro Nelson  YOB: 1975    PCP: Primary Doctor No   PCP Address: None  PCP Phone Number: None  PCP Fax: None    Encounter Date: 06/02/2017    Admit to Home Health    Diagnoses:  Active Hospital Problems    Diagnosis  POA    *Crohn's disease of ileum with fistula [K50.013]  Yes      Resolved Hospital Problems    Diagnosis Date Resolved POA   No resolved problems to display.       Future Appointments  Date Time Provider Department Center   6/14/2017 2:45 PM OKSANA Upton MD [facility]           I have seen and examined this patient face to face today. My clinical findings that support the need for the home health skilled services and home bound status are the following:  Weakness/numbness causing balance and gait disturbance due to Surgery making it taxing to leave home.    Allergies:Review of patient's allergies indicates:  No Known Allergies    Diet: regular diet    Activities: activity as tolerated, no lifting over 10lbs    Nursing:   SN to complete comprehensive assessment including routine vital signs. Instruct on disease process and s/s of complications to report to MD. Review/verify medication list sent home with the patient at time of discharge  and instruct patient/caregiver as needed. Frequency may be adjusted depending on start of care date.    Notify MD if SBP > 160 or < 90; DBP > 90 or < 50; HR > 120 or < 50; Temp > 101; Other:     MISCELLANEOUS CARE:  Wound Care Orders:  yes:  Surgical Wound:  Location: Abdomen    Consult ET nurse        Apply the following to wound:Right abdominal wound and right lateral flank- irrigate with saline, wick wound with Aquacel AG and cover with Mepore dressing  Three times a week.       WOUND CARE ORDERS  yes:  Surgical Wound:  Location: Abdominal wound    Consult ET nurse        Apply the following to wound:   Other: three times a week  (frequency)      Medications: Review discharge medications with patient and family and provide education.      Current Discharge Medication List      CONTINUE these medications which have NOT CHANGED    Details   metronidazole (FLAGYL) 500 MG tablet On the day before your surgery, take 1 tablet (500mg) at 1pm, 2pm and 11pm.  Qty: 3 tablet, Refills: 0      neomycin (MYCIFRADIN) 500 mg Tab On the day before your surgery, take 2 tablets (1,000mg) by mouth at 1pm, 2pm and 11pm.  Qty: 6 tablet, Refills: 0      tramadol (ULTRAM) 50 mg tablet Take 50 mg by mouth every 6 (six) hours as needed for Pain.             I certify that this patient is confined to his home and needs intermittent skilled nursing care.

## 2017-06-02 NOTE — PLAN OF CARE
Problem: Patient Care Overview  Goal: Plan of Care Review  Outcome: Ongoing (interventions implemented as appropriate)  Plan of care reviewed with patient who verbalized understanding. Pt ambulates frequently and without incident. Pt uses the incentive spirometer with encouragement. Pt describes pain as well controlled, with regular frequent administrations of pain medications.

## 2017-06-03 VITALS
HEIGHT: 75 IN | RESPIRATION RATE: 18 BRPM | DIASTOLIC BLOOD PRESSURE: 64 MMHG | OXYGEN SATURATION: 98 % | HEART RATE: 77 BPM | SYSTOLIC BLOOD PRESSURE: 108 MMHG | BODY MASS INDEX: 18.2 KG/M2 | WEIGHT: 146.38 LBS | TEMPERATURE: 98 F

## 2017-06-03 LAB
ANION GAP SERPL CALC-SCNC: 10 MMOL/L
BASOPHILS # BLD AUTO: 0.01 K/UL
BASOPHILS NFR BLD: 0.1 %
BUN SERPL-MCNC: 5 MG/DL
CALCIUM SERPL-MCNC: 7.7 MG/DL
CHLORIDE SERPL-SCNC: 102 MMOL/L
CO2 SERPL-SCNC: 26 MMOL/L
CREAT SERPL-MCNC: 0.7 MG/DL
DIFFERENTIAL METHOD: ABNORMAL
EOSINOPHIL # BLD AUTO: 0.2 K/UL
EOSINOPHIL NFR BLD: 2 %
ERYTHROCYTE [DISTWIDTH] IN BLOOD BY AUTOMATED COUNT: 17.5 %
EST. GFR  (AFRICAN AMERICAN): >60 ML/MIN/1.73 M^2
EST. GFR  (NON AFRICAN AMERICAN): >60 ML/MIN/1.73 M^2
GLUCOSE SERPL-MCNC: 95 MG/DL
HCT VFR BLD AUTO: 23.8 %
HGB BLD-MCNC: 7.5 G/DL
LYMPHOCYTES # BLD AUTO: 1.5 K/UL
LYMPHOCYTES NFR BLD: 18.2 %
MCH RBC QN AUTO: 25.7 PG
MCHC RBC AUTO-ENTMCNC: 31.5 %
MCV RBC AUTO: 82 FL
MONOCYTES # BLD AUTO: 1 K/UL
MONOCYTES NFR BLD: 11.4 %
NEUTROPHILS # BLD AUTO: 5.6 K/UL
NEUTROPHILS NFR BLD: 67.6 %
PLATELET # BLD AUTO: 259 K/UL
PMV BLD AUTO: 9.3 FL
POTASSIUM SERPL-SCNC: 3.6 MMOL/L
RBC # BLD AUTO: 2.92 M/UL
SODIUM SERPL-SCNC: 138 MMOL/L
WBC # BLD AUTO: 8.34 K/UL

## 2017-06-03 PROCEDURE — 85025 COMPLETE CBC W/AUTO DIFF WBC: CPT

## 2017-06-03 PROCEDURE — 25000003 PHARM REV CODE 250: Performed by: SURGERY

## 2017-06-03 PROCEDURE — 25000003 PHARM REV CODE 250: Performed by: STUDENT IN AN ORGANIZED HEALTH CARE EDUCATION/TRAINING PROGRAM

## 2017-06-03 PROCEDURE — 80048 BASIC METABOLIC PNL TOTAL CA: CPT

## 2017-06-03 PROCEDURE — C9113 INJ PANTOPRAZOLE SODIUM, VIA: HCPCS | Performed by: STUDENT IN AN ORGANIZED HEALTH CARE EDUCATION/TRAINING PROGRAM

## 2017-06-03 PROCEDURE — 63600175 PHARM REV CODE 636 W HCPCS: Performed by: STUDENT IN AN ORGANIZED HEALTH CARE EDUCATION/TRAINING PROGRAM

## 2017-06-03 PROCEDURE — 36415 COLL VENOUS BLD VENIPUNCTURE: CPT

## 2017-06-03 RX ORDER — POTASSIUM CHLORIDE 20 MEQ/1
40 TABLET, EXTENDED RELEASE ORAL ONCE
Status: COMPLETED | OUTPATIENT
Start: 2017-06-03 | End: 2017-06-03

## 2017-06-03 RX ORDER — ONDANSETRON 4 MG/1
4 TABLET, ORALLY DISINTEGRATING ORAL EVERY 8 HOURS PRN
Qty: 30 TABLET | Refills: 0 | Status: SHIPPED | OUTPATIENT
Start: 2017-06-03 | End: 2018-01-10

## 2017-06-03 RX ORDER — HYDROCODONE BITARTRATE AND ACETAMINOPHEN 5; 325 MG/1; MG/1
1 TABLET ORAL EVERY 4 HOURS PRN
Qty: 30 TABLET | Refills: 0 | Status: SHIPPED | OUTPATIENT
Start: 2017-06-03 | End: 2018-01-10

## 2017-06-03 RX ADMIN — PANTOPRAZOLE SODIUM 40 MG: 40 INJECTION, POWDER, FOR SOLUTION INTRAVENOUS at 08:06

## 2017-06-03 RX ADMIN — HYDROMORPHONE HYDROCHLORIDE 1 MG: 1 INJECTION, SOLUTION INTRAMUSCULAR; INTRAVENOUS; SUBCUTANEOUS at 08:06

## 2017-06-03 RX ADMIN — HYDROCODONE BITARTRATE AND ACETAMINOPHEN 1 TABLET: 5; 325 TABLET ORAL at 12:06

## 2017-06-03 RX ADMIN — POTASSIUM CHLORIDE 40 MEQ: 1500 TABLET, EXTENDED RELEASE ORAL at 08:06

## 2017-06-03 RX ADMIN — HYDROCODONE BITARTRATE AND ACETAMINOPHEN 1 TABLET: 5; 325 TABLET ORAL at 06:06

## 2017-06-03 RX ADMIN — IBUPROFEN 800 MG: 800 INJECTION INTRAVENOUS at 06:06

## 2017-06-03 NOTE — PROGRESS NOTES
COLON RECTAL SURGERY  PROGRESS NOTE    LENGTH OF STAY: 7  POST OPERATIVE DAY: 7 Days Post-Op for Procedure(s) (LRB):  ILEOCOLECTOMY open (N/A)  CATHETERIZATION-URETERAL (Bilateral)  EXPLORATORY-LAPAROTOMY (N/A)  LYSIS-ADHESION (N/A)  RESECTION - SMALL BOWEL  ANASTOMOSIS-INTESTINAL  REPAIR-FISTULA-ENTEROCUTANEOUS (N/A)  WRAP-OMENTAL (N/A)     Subjective     Daily HPI: No acute events overnight, pain controlled, reports b/m with flatus.  Denies f/c/n/v. Ruth clears.  Ambulating.      Objective     Vitals:    06/02/17 1600   BP: 113/68   Pulse: 73   Resp: 14   Temp: 99 °F (37.2 °C)         Intake/Output Summary (Last 24 hours) at 06/02/17 1913  Last data filed at 06/02/17 1804   Gross per 24 hour   Intake          3253.33 ml   Output             2550 ml   Net           703.33 ml       General:  male in nad  Neuro: AAO x4 MAEx4 PERRL  Chest: resps even unlabored, cap refill <2 sec  Abd: soft, nd, jerrica tpp  Wound (if present): wound margins intact and healing well.  Dressings to right anterior abdomen and right flank in place without saturation, seepage or surrounding erythema or induration  Stoma (if present): n/a     Recent Results (from the past 24 hour(s))   Basic metabolic panel    Collection Time: 06/02/17  4:55 AM   Result Value Ref Range    Sodium 138 136 - 145 mmol/L    Potassium 3.9 3.5 - 5.1 mmol/L    Chloride 102 95 - 110 mmol/L    CO2 28 23 - 29 mmol/L    Glucose 92 70 - 110 mg/dL    BUN, Bld 5 (L) 6 - 20 mg/dL    Creatinine 0.7 0.5 - 1.4 mg/dL    Calcium 7.7 (L) 8.7 - 10.5 mg/dL    Anion Gap 8 8 - 16 mmol/L    eGFR if African American >60.0 >60 mL/min/1.73 m^2    eGFR if non African American >60.0 >60 mL/min/1.73 m^2   CBC auto differential    Collection Time: 06/02/17  4:55 AM   Result Value Ref Range    WBC 10.44 3.90 - 12.70 K/uL    RBC 2.95 (L) 4.60 - 6.20 M/uL    Hemoglobin 7.5 (L) 14.0 - 18.0 g/dL    Hematocrit 24.0 (L) 40.0 - 54.0 %    MCV 81 (L) 82 - 98 fL    MCH 25.4 (L) 27.0 - 31.0 pg     MCHC 31.3 (L) 32.0 - 36.0 %    RDW 17.1 (H) 11.5 - 14.5 %    Platelets 227 150 - 350 K/uL    MPV 8.8 (L) 9.2 - 12.9 fL    Gran # 8.2 (H) 1.8 - 7.7 K/uL    Lymph # 1.1 1.0 - 4.8 K/uL    Mono # 0.9 0.3 - 1.0 K/uL    Eos # 0.1 0.0 - 0.5 K/uL    Baso # 0.02 0.00 - 0.20 K/uL    Gran% 78.8 (H) 38.0 - 73.0 %    Lymph% 10.8 (L) 18.0 - 48.0 %    Mono% 8.6 4.0 - 15.0 %    Eosinophil% 1.2 0.0 - 8.0 %    Basophil% 0.2 0.0 - 1.9 %    Differential Method Automated        Assessment and Plan     Pedro Nelson is a 41 y.o. male who is now 4 Days Post-Op s/p Procedure(s):  ILEOCOLECTOMY open (N/A)  CATHETERIZATION-URETERAL (Bilateral)  EXPLORATORY-LAPAROTOMY (N/A)  LYSIS-ADHESION (N/A)  RESECTION - SMALL BOWEL  ANASTOMOSIS-INTESTINAL  REPAIR-FISTULA-ENTEROCUTANEOUS (N/A)  WRAP-OMENTAL (N/A)     PLAN:  · Adv diet  · D/C mIVF  · Continue current pain regimen  · Dressing changes to R flank and RLQ wound as scheduled  · Protonix ppx and PRN TUMS for heartburn  · OOB/ambulate.  · Prophylaxis:  Pharm and Mechanical DVT ppx   · D/C in am     Taras Guevara MD  Colorectal Surgery Fellow  [phone]

## 2017-06-03 NOTE — PLAN OF CARE
Problem: Patient Care Overview  Goal: Plan of Care Review  Outcome: Ongoing (interventions implemented as appropriate)  Plan of care reviewed with patient who verbalized understanding. Pt ambulates frequently independently and without incident. Pt describes pain as better controlled, with little use of IV pain medication, and decreased PRN PO pain medication. Pt is tolerating his Low Fiber/ Low Residue diet better, but is still having some discomfort and indigestion. Pt uses the incentive spirometer independently when not trying to sleep.

## 2017-06-03 NOTE — PLAN OF CARE
06/03/17 1047   Final Note   Assessment Type Final Discharge Note   Discharge Disposition Home-Health   Hospital Follow Up  Appt(s) scheduled? Yes   Offered [facility] -- Bedside Delivery? n/a   Discharge/Hospital Encounter Summary to ([facility]) PCP n/a   Referral to Outpatient Case Management complete? n/a   Referral to / orders for Home Health Complete? Yes   30 day supply of medicines given at discharge, if documented non-compliance / non-adherence? n/a   Any social issues identified prior to discharge? n/a   Did you assess the readiness or willingness of the family or caregiver to support self management of care? n/a   Right Care Referral Info   Post Acute Recommendation Home-care   Facility Name [facility]

## 2017-06-03 NOTE — PLAN OF CARE
Called [facility] ([phone]) spoke with Gay, advised pt is discharging home today, she stated they will see him tomorrow.

## 2017-06-03 NOTE — NURSING
Discharge instructions given to patient. Patient verbalized understanding of Discharge instructions. LDAs removed. Prescriptions in hand. Will continue to monitor until patient is off unit.

## 2017-06-05 NOTE — DISCHARGE SUMMARY
[facility]  Discharge Summary  Colorectal Surgery      Admit Date: 5/26/2017    Discharge Date and Time: 6/3/2017  1:08 PM     Attending Physician: No att. providers found     Discharge Provider: Taras Guevara    Reason for Admission: Crohn's disease    Procedures Performed: Procedure(s) (LRB):  ILEOCOLECTOMY open (N/A)  CATHETERIZATION-URETERAL (Bilateral)  EXPLORATORY-LAPAROTOMY (N/A)  LYSIS-ADHESION (N/A)  RESECTION - SMALL BOWEL  ANASTOMOSIS-INTESTINAL  REPAIR-FISTULA-ENTEROCUTANEOUS (N/A)  WRAP-OMENTAL (N/A)    Hospital Course: Pedro Nelson is a 41 y.o. male presented for the above procedure. Pt tolerated the procedure well in its entirety without issue. For more details, please refer to op note. Post-op, pt was transferred to the floor and had a benign hospital course. Pt was started on progressive diet and tolerated it well. Pain was controlled with PO analgesics. Pt was able to ambulate and urinate without issue. Pt stable for discharge.         Consults: none    Final Diagnoses:    Principal Problem: Crohn's disease of ileum with fistula   Secondary Diagnoses:   Active Hospital Problems    Diagnosis  POA    *Crohn's disease of ileum with fistula [K50.013]  Yes      Resolved Hospital Problems    Diagnosis Date Resolved POA   No resolved problems to display.       Discharged Condition: good    Disposition: Home or Self Care    Follow Up/Patient Instructions:     Medications:  Reconciled Home Medications:   Discharge Medication List as of 6/3/2017 11:10 AM      START taking these medications    Details   hydrocodone-acetaminophen 5-325mg (NORCO) 5-325 mg per tablet Take 1 tablet by mouth every 4 (four) hours as needed., Starting Sat 6/3/2017, Print         CONTINUE these medications which have NOT CHANGED    Details   metronidazole (FLAGYL) 500 MG tablet On the day before your surgery, take 1 tablet (500mg) at 1pm, 2pm and 11pm., Normal      neomycin (MYCIFRADIN) 500 mg Tab On the day  before your surgery, take 2 tablets (1,000mg) by mouth at 1pm, 2pm and 11pm., Normal      tramadol (ULTRAM) 50 mg tablet Take 50 mg by mouth every 6 (six) hours as needed for Pain., Historical Med             Discharge Procedure Orders  Diet general     Call MD for:  temperature >100.4     Call MD for:  persistent nausea and vomiting or diarrhea     Call MD for:  severe uncontrolled pain     Call MD for:  redness, tenderness, or signs of infection (pain, swelling, redness, odor or green/yellow discharge around incision site)     Other restrictions (specify):   Scheduling Instructions: No heavy lifting until follow up appointment (heavier than 15lbs)       Follow-up Information     H Benjamin Upton MD In 2 weeks.    Specialty:  Colon and Rectal Surgery  Contact information:  [address]  [facility] [address] [phone]

## 2017-06-07 ENCOUNTER — TELEPHONE (OUTPATIENT)
Dept: SURGERY | Facility: CLINIC | Age: 42
End: 2017-06-07

## 2017-06-07 NOTE — TELEPHONE ENCOUNTER
Saw pt was discharged a few days ago.  Called and spoke with pt  I explained who I was and why I was calling and he stated that after he got out the hospital roughly 2 hours later he found out he was let go from his job.  He managed to get a ride back to [place] and is now home   He stated he will be unable to get here for appointments and needs to be referred to someone closer to home.   I told him I would contact Dr Cr's office for him and have someone from there reach out.  I told him if his situation changes and would like to come see us to give me a call   He expressed understanding.    Called Dr Cr's office and after several transfers I finally just left a message with Katie.  I explained that pt will be unable to come see us and asked if they could refer someone local for him to see.  She stated she would pass the message over to the Nurse and they will reach out to pt.

## 2017-06-07 NOTE — TELEPHONE ENCOUNTER
----- Message from Cassie Dunn LPN sent at 6/7/2017 10:19 AM CDT -----  Contact: pt#[phone]      ----- Message -----  From: Linda Luis  Sent: 6/7/2017   9:41 AM  To: Won Alexander Staff    Pt wants to speak with you. He states that he moved and needs a order for wound care. Please call

## 2017-06-07 NOTE — TELEPHONE ENCOUNTER
He has moved back to [place] and needs to set up wound care in [place], requested referrral to [facility] [phone], s/w [place] and records faxed to [phone] and she will set him up for an appt, told him to get a crs surgeon and GI doctor in [place] which is close to [facility]

## 2017-06-13 LAB — PATHOLOGIST INTERPRETATION TRANSF REACTION: NORMAL

## 2017-06-20 LAB — SYMPTOMS P TRANSF RX PATIENT-IMP: NORMAL

## 2017-12-29 ENCOUNTER — TELEPHONE (OUTPATIENT)
Dept: GASTROENTEROLOGY | Facility: CLINIC | Age: 42
End: 2017-12-29

## 2018-01-02 ENCOUNTER — TELEPHONE (OUTPATIENT)
Dept: SURGERY | Facility: CLINIC | Age: 43
End: 2018-01-02

## 2018-01-02 NOTE — TELEPHONE ENCOUNTER
----- Message from Jenelle Mercado sent at 1/2/2018  3:52 PM CST -----  Contact: Pt # [phone]  Pt states he was calling to speak to the nurse about him being Admitted in the Hospital and was calling to see if it is still scheduled. Pt would like a phone call back #[phone]

## 2018-01-10 ENCOUNTER — OFFICE VISIT (OUTPATIENT)
Dept: SURGERY | Facility: CLINIC | Age: 43
End: 2018-01-10
Payer: COMMERCIAL

## 2018-01-10 ENCOUNTER — HOSPITAL ENCOUNTER (INPATIENT)
Facility: HOSPITAL | Age: 43
LOS: 18 days | Discharge: HOME HEALTH | DRG: 908 | End: 2018-01-28
Attending: COLON & RECTAL SURGERY | Admitting: COLON & RECTAL SURGERY
Payer: COMMERCIAL

## 2018-01-10 ENCOUNTER — INITIAL CONSULT (OUTPATIENT)
Dept: WOUND CARE | Facility: CLINIC | Age: 43
End: 2018-01-10
Payer: COMMERCIAL

## 2018-01-10 VITALS
HEART RATE: 91 BPM | DIASTOLIC BLOOD PRESSURE: 61 MMHG | WEIGHT: 136.69 LBS | BODY MASS INDEX: 17 KG/M2 | HEIGHT: 75 IN | SYSTOLIC BLOOD PRESSURE: 107 MMHG

## 2018-01-10 DIAGNOSIS — K63.2 ENTEROCUTANEOUS FISTULA: Primary | ICD-10-CM

## 2018-01-10 DIAGNOSIS — K63.2 FISTULA OF INTESTINE TO ABDOMINAL WALL: Primary | ICD-10-CM

## 2018-01-10 DIAGNOSIS — K94.19 IRRITANT CONTACT DERMATITIS DUE TO ILEOSTOMY: ICD-10-CM

## 2018-01-10 DIAGNOSIS — L24.9 IRRITANT CONTACT DERMATITIS DUE TO ILEOSTOMY: ICD-10-CM

## 2018-01-10 DIAGNOSIS — K50.013 CROHN'S DISEASE OF ILEUM WITH FISTULA: ICD-10-CM

## 2018-01-10 DIAGNOSIS — K50.013 CROHN'S DISEASE OF ILEUM WITH FISTULA: Primary | ICD-10-CM

## 2018-01-10 DIAGNOSIS — K63.2 ENTEROCUTANEOUS FISTULA: ICD-10-CM

## 2018-01-10 LAB
ALBUMIN SERPL BCP-MCNC: 2.8 G/DL
ALBUMIN SERPL BCP-MCNC: 2.8 G/DL
ALP SERPL-CCNC: 96 U/L
ALT SERPL W/O P-5'-P-CCNC: 11 U/L
ANION GAP SERPL CALC-SCNC: 7 MMOL/L
APTT BLDCRRT: 23.7 SEC
AST SERPL-CCNC: 13 U/L
BASOPHILS # BLD AUTO: 0.02 K/UL
BASOPHILS NFR BLD: 0.3 %
BILIRUB SERPL-MCNC: 1.2 MG/DL
BUN SERPL-MCNC: 11 MG/DL
CALCIUM SERPL-MCNC: 8.4 MG/DL
CHLORIDE SERPL-SCNC: 104 MMOL/L
CO2 SERPL-SCNC: 25 MMOL/L
CREAT SERPL-MCNC: 0.8 MG/DL
DIFFERENTIAL METHOD: ABNORMAL
EOSINOPHIL # BLD AUTO: 0.2 K/UL
EOSINOPHIL NFR BLD: 2.6 %
ERYTHROCYTE [DISTWIDTH] IN BLOOD BY AUTOMATED COUNT: 17.1 %
EST. GFR  (AFRICAN AMERICAN): >60 ML/MIN/1.73 M^2
EST. GFR  (NON AFRICAN AMERICAN): >60 ML/MIN/1.73 M^2
GLUCOSE SERPL-MCNC: 85 MG/DL
HCT VFR BLD AUTO: 25.7 %
HGB BLD-MCNC: 7.8 G/DL
IMM GRANULOCYTES # BLD AUTO: 0.03 K/UL
IMM GRANULOCYTES NFR BLD AUTO: 0.5 %
INR PPP: 1.1
LYMPHOCYTES # BLD AUTO: 1.2 K/UL
LYMPHOCYTES NFR BLD: 19 %
MAGNESIUM SERPL-MCNC: 1.9 MG/DL
MCH RBC QN AUTO: 23.1 PG
MCHC RBC AUTO-ENTMCNC: 30.4 G/DL
MCV RBC AUTO: 76 FL
MONOCYTES # BLD AUTO: 0.7 K/UL
MONOCYTES NFR BLD: 10.4 %
NEUTROPHILS # BLD AUTO: 4.4 K/UL
NEUTROPHILS NFR BLD: 67.2 %
NRBC BLD-RTO: 0 /100 WBC
PHOSPHATE SERPL-MCNC: 3.2 MG/DL
PLATELET # BLD AUTO: 220 K/UL
PMV BLD AUTO: 9.8 FL
POCT GLUCOSE: 104 MG/DL (ref 70–110)
POTASSIUM SERPL-SCNC: 4.3 MMOL/L
PREALB SERPL-MCNC: 12 MG/DL
PROT SERPL-MCNC: 7.5 G/DL
PROTHROMBIN TIME: 11.3 SEC
RBC # BLD AUTO: 3.38 M/UL
SODIUM SERPL-SCNC: 136 MMOL/L
WBC # BLD AUTO: 6.51 K/UL

## 2018-01-10 PROCEDURE — 84100 ASSAY OF PHOSPHORUS: CPT

## 2018-01-10 PROCEDURE — 99999 PR PBB SHADOW E&M-EST. PATIENT-LVL I: CPT | Mod: PBBFAC,,, | Performed by: CLINICAL NURSE SPECIALIST

## 2018-01-10 PROCEDURE — 85610 PROTHROMBIN TIME: CPT

## 2018-01-10 PROCEDURE — 25000003 PHARM REV CODE 250: Performed by: STUDENT IN AN ORGANIZED HEALTH CARE EDUCATION/TRAINING PROGRAM

## 2018-01-10 PROCEDURE — 83735 ASSAY OF MAGNESIUM: CPT

## 2018-01-10 PROCEDURE — 80053 COMPREHEN METABOLIC PANEL: CPT

## 2018-01-10 PROCEDURE — 85025 COMPLETE CBC W/AUTO DIFF WBC: CPT

## 2018-01-10 PROCEDURE — 11000001 HC ACUTE MED/SURG PRIVATE ROOM

## 2018-01-10 PROCEDURE — 85730 THROMBOPLASTIN TIME PARTIAL: CPT

## 2018-01-10 PROCEDURE — 99499 UNLISTED E&M SERVICE: CPT | Mod: S$GLB,,, | Performed by: CLINICAL NURSE SPECIALIST

## 2018-01-10 PROCEDURE — 84134 ASSAY OF PREALBUMIN: CPT

## 2018-01-10 PROCEDURE — 99999 PR PBB SHADOW E&M-EST. PATIENT-LVL III: CPT | Mod: PBBFAC,,, | Performed by: COLON & RECTAL SURGERY

## 2018-01-10 PROCEDURE — 99214 OFFICE O/P EST MOD 30 MIN: CPT | Mod: S$GLB,,, | Performed by: COLON & RECTAL SURGERY

## 2018-01-10 RX ORDER — HYDROCODONE BITARTRATE AND ACETAMINOPHEN 10; 325 MG/1; MG/1
1 TABLET ORAL EVERY 4 HOURS PRN
Status: DISCONTINUED | OUTPATIENT
Start: 2018-01-10 | End: 2018-01-18

## 2018-01-10 RX ORDER — ACETAMINOPHEN 325 MG/1
650 TABLET ORAL EVERY 8 HOURS PRN
Status: DISCONTINUED | OUTPATIENT
Start: 2018-01-10 | End: 2018-01-18

## 2018-01-10 RX ORDER — SODIUM CHLORIDE 9 MG/ML
INJECTION, SOLUTION INTRAVENOUS CONTINUOUS
Status: DISCONTINUED | OUTPATIENT
Start: 2018-01-10 | End: 2018-01-11

## 2018-01-10 RX ORDER — ADALIMUMAB 40MG/0.8ML
KIT SUBCUTANEOUS
Refills: 1 | COMMUNITY
Start: 2017-12-22

## 2018-01-10 RX ORDER — HYDROCODONE BITARTRATE AND ACETAMINOPHEN 5; 325 MG/1; MG/1
1 TABLET ORAL EVERY 4 HOURS PRN
Status: DISCONTINUED | OUTPATIENT
Start: 2018-01-10 | End: 2018-01-18

## 2018-01-10 RX ORDER — ACETAMINOPHEN 325 MG/1
650 TABLET ORAL EVERY 4 HOURS PRN
Status: DISCONTINUED | OUTPATIENT
Start: 2018-01-10 | End: 2018-01-18

## 2018-01-10 RX ORDER — DIPHENHYDRAMINE HYDROCHLORIDE 50 MG/ML
25 INJECTION INTRAMUSCULAR; INTRAVENOUS EVERY 4 HOURS PRN
Status: DISCONTINUED | OUTPATIENT
Start: 2018-01-10 | End: 2018-01-25

## 2018-01-10 RX ORDER — RAMELTEON 8 MG/1
8 TABLET ORAL NIGHTLY PRN
Status: DISCONTINUED | OUTPATIENT
Start: 2018-01-10 | End: 2018-01-28 | Stop reason: HOSPADM

## 2018-01-10 RX ORDER — SODIUM CHLORIDE 0.9 % (FLUSH) 0.9 %
3 SYRINGE (ML) INJECTION
Status: DISCONTINUED | OUTPATIENT
Start: 2018-01-10 | End: 2018-01-20

## 2018-01-10 RX ORDER — ONDANSETRON 2 MG/ML
4 INJECTION INTRAMUSCULAR; INTRAVENOUS EVERY 6 HOURS PRN
Status: DISCONTINUED | OUTPATIENT
Start: 2018-01-10 | End: 2018-01-28 | Stop reason: HOSPADM

## 2018-01-10 RX ADMIN — HYDROCODONE BITARTRATE AND ACETAMINOPHEN 1 TABLET: 10; 325 TABLET ORAL at 06:01

## 2018-01-10 RX ADMIN — HYDROCODONE BITARTRATE AND ACETAMINOPHEN 1 TABLET: 5; 325 TABLET ORAL at 11:01

## 2018-01-10 RX ADMIN — HYDROCODONE BITARTRATE AND ACETAMINOPHEN 1 TABLET: 10; 325 TABLET ORAL at 02:01

## 2018-01-10 RX ADMIN — SODIUM CHLORIDE: 0.9 INJECTION, SOLUTION INTRAVENOUS at 04:01

## 2018-01-10 NOTE — PROGRESS NOTES
This is pt  I have seen in past, he comes today with new fistula and will be admitted for work up,  Nutrition and surgery in near future. He now has 3 separate ECF , two are on abdomen and one is posterior at waist, this one drains the least. They are all  Stomatized. Skin is red but not as bad as could be, he uses guaze, and changes 3x daily .                   Will alert inpt team to see and pouch him

## 2018-01-10 NOTE — PROGRESS NOTES
Enterocutaneous fistula    HPI  41 yo male with Crohn's ileitis, fistulizing.  Underwent resection 5/ 2017. Did well initially after surgery.  D/c from hospital tolerating diet, moving bowels.  Returned to [place].      Two weeks after d/c pt began leaking from midline wound.  Managed at local hospital.    TPN antibiotics.   Never operated on there.    Now leaking from all prior EC fistula sites - midline wound, right lower quadrant, and right flank.    Currently no nausea or vomiting.  Tolerating some PO.  BMs qod.  No blood.  TPN last given two days ago.  No fever.    Last biologic Humira - last dose - 40 u qow.  Last dose last week.      Past Medical History:   Diagnosis Date    Crohn's disease      Past Surgical History:   Procedure Laterality Date    COLON SURGERY  2000     Review of patient's allergies indicates:  No Known Allergies    Current Outpatient Prescriptions on File Prior to Visit   Medication Sig Dispense Refill    tramadol (ULTRAM) 50 mg tablet Take 50 mg by mouth every 6 (six) hours as needed for Pain.      [DISCONTINUED] hydrocodone-acetaminophen 5-325mg (NORCO) 5-325 mg per tablet Take 1 tablet by mouth every 4 (four) hours as needed. 30 tablet 0    [DISCONTINUED] metronidazole (FLAGYL) 500 MG tablet On the day before your surgery, take 1 tablet (500mg) at 1pm, 2pm and 11pm. 3 tablet 0    [DISCONTINUED] neomycin (MYCIFRADIN) 500 mg Tab On the day before your surgery, take 2 tablets (1,000mg) by mouth at 1pm, 2pm and 11pm. 6 tablet 0    [DISCONTINUED] ondansetron (ZOFRAN-ODT) 4 MG TbDL Take 1 tablet (4 mg total) by mouth every 8 (eight) hours as needed. 30 tablet 0     No current facility-administered medications on file prior to visit.      No family history on file.    Social History     Social History    Marital status:      Spouse name: N/A    Number of children: N/A    Years of education: N/A     Occupational History    Not on file.     Social History Main Topics    Smoking  "status: Current Every Day Smoker     Packs/day: 0.25     Years: 10.00     Types: Cigarettes    Smokeless tobacco: Not on file    Alcohol use No    Drug use: No    Sexual activity: Not Currently     Other Topics Concern    Not on file     Social History Narrative    No narrative on file       ROS:  GENERAL: No fever, chills, fatigability or weight loss.  Integument:  No rashes, redness, icterus  CHEST: Denies GRANDA, cyanosis, wheezing, cough and sputum production.  CARDIOVASCULAR: Denies chest pain, PND, orthopnea or reduced exercise tolerance.  GI:  Denies dysphagia, nausea, vomiting, no hematemesis   : Denies burning on urination, no hematuria, no bacteriuria  MSK:  No deformities, swelling, joint pain swelling  Neurologic:  No HAs, seizures, weakness, paresthesias, gait problems    PE  Thin male in NAD  /61 (BP Location: Left arm, Patient Position: Sitting, BP Method: Large (Automatic))   Pulse 91   Ht 6' 3" (1.905 m)   Wt 62 kg (136 lb 11 oz)   BMI 17.08 kg/m²    Skin anicteric  AT NC EOMI  Neck supple, trachea midline  Chest symmetric nl excursions, no retractions  ABdomen              Soft NT ND.  No masses  EXT no  CCE  Neuro mood affect nl.  Moves all ext's purposefully.  Gait nl    Assessment  Recurrent enterocutaneous fistulas  Crohn's ileitis, fistulizing phenotype  Malnutrition    Recommend  Inpatient management  TPN  CT scan abd pelvis to assess fistulas - presumable leak from anastomosis  Skin care - pouch RLQ fistula      Addendum  Operative findings:  FINDINGS:  The patient was noted to have evidence of fistula to the right lower   quadrant and to the right flank.  These were related to a cavity in the right   lateral abdominal wall, which were ultimately connected to the neoterminal   ileum.  There was a portion of small intestine, ileum, which was proximal to   these fistulae and was densely adherent to the fistulized process and was not   salvageable and required a small bowel " resection.  Additional resection was 300 cm   from the ligament of Treitz.  The residual small intestine following resection   was 450 cm.  There was no evidence of additional Crohn disease in the remaining   small intestine or large intestine.  There was no evidence of active   suppuration.

## 2018-01-10 NOTE — PROGRESS NOTES
Pt arrived to unit. md on call paged to make aware of pt arrival. Right arm picc line in place. Wounds to abdomen and waist covered with gauze and taped. No complaints at this time. VSS. Will cont to monitor.

## 2018-01-10 NOTE — LETTER
January 10, 2018      OKSANA Upton MD  [address]  [facility] [address]           [facility]  [address]  [facility] [address]  Phone: [phone]          Patient: Pedro Nelson   MR Number: [id number]   YOB: 1975   Date of Visit: 1/10/2018       Dear Dr. OKSANA Upton:    Thank you for referring Pedro Nelson to me for evaluation. Attached you will find relevant portions of my assessment and plan of care.    If you have questions, please do not hesitate to call me. I look forward to following Pedro Nelson along with you.    Sincerely,    Lizzie Campos, CNS    Enclosure  CC:  No Recipients    If you would like to receive this communication electronically, please contact [email or web address] or [phone] to request more information on [employer] access.    For providers and/or their staff who would like to refer a patient to [facility], please contact us through our one-stop-shop provider referral line, Aren Prater, at [phone].    If you feel you have received this communication in error or would no longer like to receive these types of communications, please e-mail [email or web address]

## 2018-01-11 LAB
ANION GAP SERPL CALC-SCNC: 7 MMOL/L
BASOPHILS # BLD AUTO: 0.02 K/UL
BASOPHILS NFR BLD: 0.3 %
BUN SERPL-MCNC: 14 MG/DL
CALCIUM SERPL-MCNC: 8.1 MG/DL
CHLORIDE SERPL-SCNC: 106 MMOL/L
CO2 SERPL-SCNC: 24 MMOL/L
CREAT SERPL-MCNC: 0.8 MG/DL
DIFFERENTIAL METHOD: ABNORMAL
EOSINOPHIL # BLD AUTO: 0.2 K/UL
EOSINOPHIL NFR BLD: 2.9 %
ERYTHROCYTE [DISTWIDTH] IN BLOOD BY AUTOMATED COUNT: 17 %
EST. GFR  (AFRICAN AMERICAN): >60 ML/MIN/1.73 M^2
EST. GFR  (NON AFRICAN AMERICAN): >60 ML/MIN/1.73 M^2
GLUCOSE SERPL-MCNC: 71 MG/DL
HCT VFR BLD AUTO: 25.8 %
HGB BLD-MCNC: 7.8 G/DL
IMM GRANULOCYTES # BLD AUTO: 0.03 K/UL
IMM GRANULOCYTES NFR BLD AUTO: 0.5 %
LYMPHOCYTES # BLD AUTO: 1.1 K/UL
LYMPHOCYTES NFR BLD: 16.6 %
MAGNESIUM SERPL-MCNC: 2 MG/DL
MCH RBC QN AUTO: 23.7 PG
MCHC RBC AUTO-ENTMCNC: 30.2 G/DL
MCV RBC AUTO: 78 FL
MONOCYTES # BLD AUTO: 0.6 K/UL
MONOCYTES NFR BLD: 9.7 %
NEUTROPHILS # BLD AUTO: 4.6 K/UL
NEUTROPHILS NFR BLD: 70 %
NRBC BLD-RTO: 0 /100 WBC
PHOSPHATE SERPL-MCNC: 3.1 MG/DL
PLATELET # BLD AUTO: 192 K/UL
PMV BLD AUTO: 9.9 FL
POTASSIUM SERPL-SCNC: 4.3 MMOL/L
RBC # BLD AUTO: 3.29 M/UL
SODIUM SERPL-SCNC: 137 MMOL/L
WBC # BLD AUTO: 6.57 K/UL

## 2018-01-11 PROCEDURE — 25500020 PHARM REV CODE 255: Performed by: COLON & RECTAL SURGERY

## 2018-01-11 PROCEDURE — 85025 COMPLETE CBC W/AUTO DIFF WBC: CPT

## 2018-01-11 PROCEDURE — 11000001 HC ACUTE MED/SURG PRIVATE ROOM

## 2018-01-11 PROCEDURE — 80048 BASIC METABOLIC PNL TOTAL CA: CPT

## 2018-01-11 PROCEDURE — 25000003 PHARM REV CODE 250: Performed by: STUDENT IN AN ORGANIZED HEALTH CARE EDUCATION/TRAINING PROGRAM

## 2018-01-11 PROCEDURE — 84100 ASSAY OF PHOSPHORUS: CPT

## 2018-01-11 PROCEDURE — B4185 PARENTERAL SOL 10 GM LIPIDS: HCPCS | Performed by: COLON & RECTAL SURGERY

## 2018-01-11 PROCEDURE — 27000192 HC POUCH, WOUND DRAINAGE COLLECTOR (ANY SIZE)

## 2018-01-11 PROCEDURE — 83735 ASSAY OF MAGNESIUM: CPT

## 2018-01-11 PROCEDURE — 25500020 PHARM REV CODE 255: Performed by: STUDENT IN AN ORGANIZED HEALTH CARE EDUCATION/TRAINING PROGRAM

## 2018-01-11 PROCEDURE — 25000003 PHARM REV CODE 250: Performed by: COLON & RECTAL SURGERY

## 2018-01-11 RX ORDER — CYANOCOBALAMIN 1000 UG/ML
1000 INJECTION, SOLUTION INTRAMUSCULAR; SUBCUTANEOUS
Status: DISCONTINUED | OUTPATIENT
Start: 2018-01-11 | End: 2018-01-28 | Stop reason: HOSPADM

## 2018-01-11 RX ORDER — ENOXAPARIN SODIUM 100 MG/ML
40 INJECTION SUBCUTANEOUS EVERY 24 HOURS
Status: DISCONTINUED | OUTPATIENT
Start: 2018-01-11 | End: 2018-01-20

## 2018-01-11 RX ORDER — FOLIC ACID 5 MG/ML
1 INJECTION, SOLUTION INTRAMUSCULAR; INTRAVENOUS; SUBCUTANEOUS DAILY
Status: DISCONTINUED | OUTPATIENT
Start: 2018-01-11 | End: 2018-01-11

## 2018-01-11 RX ADMIN — HYDROCODONE BITARTRATE AND ACETAMINOPHEN 1 TABLET: 5; 325 TABLET ORAL at 04:01

## 2018-01-11 RX ADMIN — SOYBEAN OIL 250 ML: 20 INJECTION, SOLUTION INTRAVENOUS at 09:01

## 2018-01-11 RX ADMIN — ASCORBIC ACID, VITAMIN A PALMITATE, CHOLECALCIFEROL, THIAMINE HYDROCHLORIDE, RIBOFLAVIN-5 PHOSPHATE SODIUM, PYRIDOXINE HYDROCHLORIDE, NIACINAMIDE, DEXPANTHENOL, ALPHA-TOCOPHEROL ACETATE, VITAMIN K1, FOLIC ACID, BIOTIN, CYANOCOBALAMIN: 200; 3300; 200; 6; 3.6; 6; 40; 15; 10; 150; 600; 60; 5 INJECTION, SOLUTION INTRAVENOUS at 03:01

## 2018-01-11 RX ADMIN — HYDROCODONE BITARTRATE AND ACETAMINOPHEN 1 TABLET: 10; 325 TABLET ORAL at 02:01

## 2018-01-11 RX ADMIN — IOHEXOL 15 ML: 350 INJECTION, SOLUTION INTRAVENOUS at 10:01

## 2018-01-11 RX ADMIN — IOHEXOL 75 ML: 350 INJECTION, SOLUTION INTRAVENOUS at 12:01

## 2018-01-11 RX ADMIN — HYDROCODONE BITARTRATE AND ACETAMINOPHEN 1 TABLET: 10; 325 TABLET ORAL at 06:01

## 2018-01-11 RX ADMIN — HYDROCODONE BITARTRATE AND ACETAMINOPHEN 1 TABLET: 10; 325 TABLET ORAL at 11:01

## 2018-01-11 RX ADMIN — IOHEXOL 15 ML: 350 INJECTION, SOLUTION INTRAVENOUS at 11:01

## 2018-01-11 RX ADMIN — FOLIC ACID 1 MG: 5 INJECTION, SOLUTION INTRAMUSCULAR; INTRAVENOUS; SUBCUTANEOUS at 02:01

## 2018-01-11 RX ADMIN — IOHEXOL 30 ML: 350 INJECTION, SOLUTION INTRAVENOUS at 12:01

## 2018-01-11 RX ADMIN — HYDROCODONE BITARTRATE AND ACETAMINOPHEN 1 TABLET: 5; 325 TABLET ORAL at 08:01

## 2018-01-11 NOTE — PLAN OF CARE
PCP- NONE  -  HOME HEALTH AGENCY- [facility] PH# [phone]    PT HAS A RIDE HOME AND FAMILY SUPPORT WITH BROTHER. PT LIVES IN [place], [place]. PT'S BROTHER DROVE HIM TO [place] TO SEE DR. NICHOLAS. PT WILL D/C TO HOME IN [place], [place].   PHARMACY- [facility]  [address] [place] [id number], ph# [phone]       01/11/18 1428   Discharge Assessment   Assessment Type Discharge Planning Assessment   Confirmed/corrected address and phone number on facesheet? Yes   Assessment information obtained from? Patient   Expected Length of Stay (days) 4   Communicated expected length of stay with patient/caregiver yes   Prior to hospitilization cognitive status: Alert/Oriented   Prior to hospitalization functional status: Assistive Equipment   Current cognitive status: Alert/Oriented   Current Functional Status: Assistive Equipment   Lives With alone   Able to Return to Prior Arrangements yes   Is patient able to care for self after discharge? Yes   Patient's perception of discharge disposition home health   Readmission Within The Last 30 Days no previous admission in last 30 days   Patient currently being followed by outpatient case management? No   Patient currently receives any other outside agency services? No   Equipment Currently Used at Home none   Do you have any problems affording any of your prescribed medications? No   Is the patient taking medications as prescribed? yes   Does the patient have transportation home? Yes   Transportation Available car;family or friend will provide   Does the patient receive services at the [facility] Clinic? No   Discharge Plan A Home with family;Home Health   Discharge Plan B Home with family;Home Health   Patient/Family In Agreement With Plan yes

## 2018-01-11 NOTE — NURSING
Spoke with MD, informed him that patient refused lovenox injection because he is walking, he also refused the cyanocobalamin injection this morning. Patient also asked for something stronger to control his pain. MD stated he will put orders in for percocet. Sherita Montilla RN

## 2018-01-11 NOTE — SUBJECTIVE & OBJECTIVE
Subjective:     Interval History:     BECCA. Continues to have drainage from abdominal fistulae. Denies abdominal pain. Denies nausea or vomiting. Awaiting CT.     Post-Op Info:  * No surgery found *          Medications:  Continuous Infusions:   Amino acid 5% - dextrose 20% (CLINIMIX-E) solution with additives (1L provides 50 gm AA, 200 gm CHO (680 kcal/L dextrose), Na 35, K 30, Mg 5, Ca 4.5, Acetate 80, Cl 39, Phos 15)       Scheduled Meds:   fat emulsion 20%  250 mL Intravenous Daily     PRN Meds:   acetaminophen    acetaminophen    diphenhydrAMINE    hydrocodone-acetaminophen 10-325mg    hydrocodone-acetaminophen 5-325mg    ondansetron    promethazine (PHENERGAN) IVPB    ramelteon    sodium chloride 0.9%        Objective:     Vital Signs (Most Recent):  Temp: 97.6 °F (36.4 °C) (01/11/18 0423)  Pulse: 78 (01/11/18 0423)  Resp: 20 (01/11/18 0423)  BP: (!) 95/52 (01/11/18 0423)  SpO2: 100 % (01/11/18 0423) Vital Signs (24h Range):  Temp:  [97.6 °F (36.4 °C)-98.9 °F (37.2 °C)] 97.6 °F (36.4 °C)  Pulse:  [71-97] 78  Resp:  [16-20] 20  SpO2:  [97 %-100 %] 100 %  BP: ()/(52-61) 95/52     Intake/Output - Last 3 Shifts       01/09 0700 - 01/10 0659 01/10 0700 - 01/11 0659 01/11 0700 - 01/12 0659    Urine (mL/kg/hr)  250     Total Output   250      Net   -250                   Physical Exam   Constitutional: He is oriented to person, place, and time. He appears well-developed and well-nourished. No distress.   HENT:   Head: Normocephalic and atraumatic.   Eyes: EOM are normal.   Neck: Normal range of motion.   Cardiovascular: Normal rate.    Pulmonary/Chest: Effort normal. No respiratory distress.   Abdominal: Soft. He exhibits no distension. There is no tenderness.   Midline fistula, RLQ fistula with loose stool output, R flank fistula with minimal drainage.   Neurological: He is alert and oriented to person, place, and time.   Skin: Skin is warm and dry.   Psychiatric: He has a normal mood and affect.  His behavior is normal. Thought content normal.       Significant Labs:  BMP (Last 3 Results):   Recent Labs  Lab 01/10/18  1701 01/11/18  0357   GLU 85 71    137   K 4.3 4.3    106   CO2 25 24   BUN 11 14   CREATININE 0.8 0.8   CALCIUM 8.4* 8.1*   MG 1.9 2.0     CBC (Last 3 Results):   Recent Labs  Lab 01/10/18  1701 01/11/18  0357   WBC 6.51 6.57   RBC 3.38* 3.29*   HGB 7.8* 7.8*   HCT 25.7* 25.8*    192   MCV 76* 78*   MCH 23.1* 23.7*   MCHC 30.4* 30.2*     CRP (Last 3 Results): No results for input(s): CRP in the last 168 hours.  Prealbumin (Last 3 Results):   Recent Labs  Lab 01/10/18  1701   PREALBUMIN 12*

## 2018-01-11 NOTE — PLAN OF CARE
Problem: Patient Care Overview  Goal: Plan of Care Review  Outcome: Ongoing (interventions implemented as appropriate)  Pt AAOx4 and VSS. Pt is progressing with plan of care. Free of skin breakdown as the pt moves well independently. Clean, dry, and intact dressing noted on lateral and midline of abdomen. SCDs in place at all times when not ambulating. No complain of pain at this time. Frequent rounds made to assess pain and safety and no complaints at this time noted. Side rails up x 2. Bed locked. Call light within reach. No falls noted. Will continue to monitor.      .

## 2018-01-11 NOTE — ASSESSMENT & PLAN NOTE
-NPO, TPN per PICC, will order B12, folate, iron.  -Awaiting CT abd/pelvis.  -WOCN to see for pouching of fistula.  -Optimize nutrition.

## 2018-01-11 NOTE — PROGRESS NOTES
[facility]  Colorectal Surgery  Progress Note    Patient Name: Pedro Nelson  MRN: [id number]  Admission Date: 1/10/2018  Hospital Length of Stay: 1 days  Attending Physician: OKSANA Upton MD    Subjective:     Interval History:     MARYEON. Continues to have drainage from abdominal fistulae. Denies abdominal pain. Denies nausea or vomiting. Awaiting CT.     Post-Op Info:  * No surgery found *          Medications:  Continuous Infusions:   Amino acid 5% - dextrose 20% (CLINIMIX-E) solution with additives (1L provides 50 gm AA, 200 gm CHO (680 kcal/L dextrose), Na 35, K 30, Mg 5, Ca 4.5, Acetate 80, Cl 39, Phos 15)       Scheduled Meds:   fat emulsion 20%  250 mL Intravenous Daily     PRN Meds:   acetaminophen    acetaminophen    diphenhydrAMINE    hydrocodone-acetaminophen 10-325mg    hydrocodone-acetaminophen 5-325mg    ondansetron    promethazine (PHENERGAN) IVPB    ramelteon    sodium chloride 0.9%        Objective:     Vital Signs (Most Recent):  Temp: 97.6 °F (36.4 °C) (01/11/18 0423)  Pulse: 78 (01/11/18 0423)  Resp: 20 (01/11/18 0423)  BP: (!) 95/52 (01/11/18 0423)  SpO2: 100 % (01/11/18 0423) Vital Signs (24h Range):  Temp:  [97.6 °F (36.4 °C)-98.9 °F (37.2 °C)] 97.6 °F (36.4 °C)  Pulse:  [71-97] 78  Resp:  [16-20] 20  SpO2:  [97 %-100 %] 100 %  BP: ()/(52-61) 95/52     Intake/Output - Last 3 Shifts       01/09 0700 - 01/10 0659 01/10 0700 - 01/11 0659 01/11 0700 - 01/12 0659    Urine (mL/kg/hr)  250     Total Output   250      Net   -250                   Physical Exam   Constitutional: He is oriented to person, place, and time. He appears well-developed and well-nourished. No distress.   HENT:   Head: Normocephalic and atraumatic.   Eyes: EOM are normal.   Neck: Normal range of motion.   Cardiovascular: Normal rate.    Pulmonary/Chest: Effort normal. No respiratory distress.   Abdominal: Soft. He exhibits no distension. There is no tenderness.   Midline fistula, RLQ  fistula with loose stool output, R flank fistula with minimal drainage.   Neurological: He is alert and oriented to person, place, and time.   Skin: Skin is warm and dry.   Psychiatric: He has a normal mood and affect. His behavior is normal. Thought content normal.       Significant Labs:  BMP (Last 3 Results):   Recent Labs  Lab 01/10/18  1701 01/11/18  0357   GLU 85 71    137   K 4.3 4.3    106   CO2 25 24   BUN 11 14   CREATININE 0.8 0.8   CALCIUM 8.4* 8.1*   MG 1.9 2.0     CBC (Last 3 Results):   Recent Labs  Lab 01/10/18  1701 01/11/18  0357   WBC 6.51 6.57   RBC 3.38* 3.29*   HGB 7.8* 7.8*   HCT 25.7* 25.8*    192   MCV 76* 78*   MCH 23.1* 23.7*   MCHC 30.4* 30.2*     CRP (Last 3 Results): No results for input(s): CRP in the last 168 hours.  Prealbumin (Last 3 Results):   Recent Labs  Lab 01/10/18  1701   PREALBUMIN 12*     Assessment/Plan:     Enterocutaneous fistula    -NPO, TPN per PICC, will order B12, folate, iron.  -Awaiting CT abd/pelvis.  -WOCN to see for pouching of fistula.  -Optimize nutrition.              Gagandeep Gordillo MD  Colorectal Surgery  [facility]

## 2018-01-11 NOTE — PROGRESS NOTES
Asked to see patient with multiple EC fistula      01/11/18 1500       Wound 01/10/18 1500 Fistula upper abdomen   Date First Assessed/Time First Assessed: 01/10/18 1500   Pre-existing: Yes  Wound Type: Fistula  Side: Right  Orientation: upper  Location: abdomen   Dressing Appearance moist drainage;intact   Drainage Amount small   Drainage Characteristics/Odor tan;no odor   Wound Base moist;pink;other (see comments)  (stomatized fistula)   Periwound Area denuded   Wound Length (cm) 1   Wound Width (cm) 1  (right lateral fistula 0.7x2cm )   Cleansed W/ sterile normal saline   Irrigated W/ sterile normal saline   Interventions liquid bandage applied;other (see comments)  (crusted raw slin with pectin ostomy powder)   Wound Pouch wound pouch applied     MD had applied pouch to right lateral flank fistula which is not producing and pouch is holding at this time.   WOC built up around midline depressions, clipped hair around groin and site where pouch would adhere. Raw maria g wound crusted with stoma powder and 3M no sting Cavilon skin sealant. Shimon wound manager cut to fit fistula openings and built up around sites prior to pouch being applied.   Kelin Cadena RN CWON  [id number]

## 2018-01-12 PROBLEM — E46 MALNUTRITION: Status: ACTIVE | Noted: 2018-01-12

## 2018-01-12 PROCEDURE — 63600175 PHARM REV CODE 636 W HCPCS: Performed by: NURSE PRACTITIONER

## 2018-01-12 PROCEDURE — 25000003 PHARM REV CODE 250: Performed by: STUDENT IN AN ORGANIZED HEALTH CARE EDUCATION/TRAINING PROGRAM

## 2018-01-12 PROCEDURE — B4185 PARENTERAL SOL 10 GM LIPIDS: HCPCS | Performed by: COLON & RECTAL SURGERY

## 2018-01-12 PROCEDURE — 25000003 PHARM REV CODE 250: Performed by: NURSE PRACTITIONER

## 2018-01-12 PROCEDURE — 25000003 PHARM REV CODE 250: Performed by: COLON & RECTAL SURGERY

## 2018-01-12 PROCEDURE — A4217 STERILE WATER/SALINE, 500 ML: HCPCS | Performed by: NURSE PRACTITIONER

## 2018-01-12 PROCEDURE — 27000192 HC POUCH, WOUND DRAINAGE COLLECTOR (ANY SIZE)

## 2018-01-12 PROCEDURE — 97802 MEDICAL NUTRITION INDIV IN: CPT

## 2018-01-12 PROCEDURE — 11000001 HC ACUTE MED/SURG PRIVATE ROOM

## 2018-01-12 RX ADMIN — HYDROCODONE BITARTRATE AND ACETAMINOPHEN 1 TABLET: 5; 325 TABLET ORAL at 04:01

## 2018-01-12 RX ADMIN — FOLIC ACID 1 MG: 5 INJECTION, SOLUTION INTRAMUSCULAR; INTRAVENOUS; SUBCUTANEOUS at 08:01

## 2018-01-12 RX ADMIN — CALCIUM GLUCONATE: 94 INJECTION, SOLUTION INTRAVENOUS at 09:01

## 2018-01-12 RX ADMIN — HYDROCODONE BITARTRATE AND ACETAMINOPHEN 1 TABLET: 5; 325 TABLET ORAL at 06:01

## 2018-01-12 RX ADMIN — SOYBEAN OIL 250 ML: 20 INJECTION, SOLUTION INTRAVENOUS at 09:01

## 2018-01-12 RX ADMIN — HYDROCODONE BITARTRATE AND ACETAMINOPHEN 1 TABLET: 10; 325 TABLET ORAL at 08:01

## 2018-01-12 RX ADMIN — HYDROCODONE BITARTRATE AND ACETAMINOPHEN 1 TABLET: 10; 325 TABLET ORAL at 10:01

## 2018-01-12 NOTE — PLAN OF CARE
Problem: Patient Care Overview  Goal: Plan of Care Review  Outcome: Ongoing (interventions implemented as appropriate)  Pt AAOx4 and VSS. Pt is progressing with plan of care. Free of skin breakdown as the pt moves well independently. Clean, dry, and intact wound pouch on abdomen and posterior lateral hip. SCDs in place at all times when not ambulating. Pain controlled well with PRN meds.Frequent rounds made to assess pain and safety and no complaints at this time noted. Side rails up x 2. Bed locked. Call light within reach. No falls noted. Will continue to monitor.      .

## 2018-01-12 NOTE — PROGRESS NOTES
[facility]  Adult Nutrition  Progress Note    SUMMARY     Recommendations  Recommendation/Intervention:   1. Current TPN exceeding EEN and EPN, recommend decreasing to Custom TPN 5% AA/20% Dextrose at 80 mL/hr + IV lipids daily - to provide 2190 kcal/day, 96 g protein/day, and 1920 mL fluid/day (GIR = 4.37 mg/kg/min).   2. As medically appropriate, ADAT to Low Fiber/Residue with texture per SLP.   RD to monitor.    Goals: Patient to meet > 85% EEN and EPN  Nutrition Goal Status: new  Communication of RD Recs: discussed on rounds    Reason for Assessment  Reason for Assessment: new TPN  Diagnosis:  (EC fistula)  Relevent Medical History: Crohn's disease   Interdisciplinary Rounds: attended  General Information Comments: Patient currently NPO, started on TPN + lipids. Per MD note, possibly start PO diet depending on ostomy output.  Nutrition Discharge Planning: Adequate nutrition via TPN.    Nutrition Prescription Ordered  Current Diet Order: NPO   Current Nutrition Support Formula Ordered: Clinimix E 5/20 (+ lipids)  Current Nutrition Support Rate Ordered: 100 (ml)  Current Nutrition Support Frequency Ordered: mL/hr      Evaluation of Received Nutrients/Fluid Intake  Parenteral Calories (kcal): 2112  Parenteral Protein (gm): 120  Parenteral Fluid (mL): 2400  GIR (Glucose Infusion Rate) (mg/kg/min): 5.46 mg/kg/min  Lipid Calories (kcals): 500 kcals  Total Calories (kcal): 2612  Energy Calories Required: exceed needs  % Kcal Needs: 122  Protein Required: exceed needs  % Protein Needs: 130  I/O: -1.6L since admit  Fluid Required: exceed needs  Comments: LBM 1/9  Tolerance: tolerating  % Intake of Estimated Energy Needs: Other: > 100%  % Meal Intake: NPO     Nutrition Risk Screen   Nutrition Risk Screen: tube feeding or parenteral nutrition    Nutrition/Diet History  Typical Food/Fluid Intake: Patient on TPN PTA.  Food Preferences: No cultural/Yarsani needs identified at this time.  Factors  "Affecting Nutritional Intake: NPO, altered gastrointestinal function  Nutrition Support Formula Prior to Admit:  (Unsure of TPN formula)    Labs/Tests/Procedures/Meds   Pertinent Labs Reviewed: reviewed  Pertinent Labs Comments: Ca 8.1  Pertinent Medications Reviewed: reviewed       Physical Findings  Overall Physical Appearance: underweight, generalized wasting  Tubes:  (none)  Oral/Mouth Cavity: WDL  Skin: non-healing wound(s) (fistula x3)    Anthropometrics  Height: 6' 3" (190.5 cm)  Weight Method: Bed Scale  Weight: 61.1 kg (134 lb 11.2 oz)  Ideal Body Weight (IBW), Male: 196 lb  % Ideal Body Weight, Male (lb): 68.72 lb  BMI (Calculated): 16.9  BMI Grade: 16 - 16.9 protein-energy malnutrition grade II  Usual Body Weight (UBW), k.2 kg (2017 per chart review)  % Usual Body Weight: 101.71  % Weight Change From Usual Weight: 1.49 %    Estimated/Assessed Needs  Weight Used For Calorie Calculations: 61.1 kg (134 lb 11.2 oz)   Energy Calorie Requirements (kcal): 6349-5440 kcal/day  Energy Need Method: Kcal/kg (30-35 kcal/kg)  RMR (W[place]ford-St. Jeor Equation): 1606.62  Weight Used For Protein Calculations: 61.1 kg (134 lb 11.2 oz)  Protein Requirements: 80-92 g/day (1.2-1.4 g/kg)  Fluid Requirements (mL): 1 mL/kcal or per MD  Fluid Need Method: RDA Method  RDA Method (mL): 1833    Assessment and Plan  Malnutrition    Nutrition Problem  Malnutrition    Related to (etiology):   Physiological causes/chronic illness    Signs and Symptoms (as evidenced by):   Crohn's disease, BMI of 16.9, and loss of muscle mass and subcutaneous fat    Nutrition Diagnosis Status:   New          Enterocutaneous fistula    Nutrition Problem  Altered GI Function    Related to (etiology):   Crohn's disease and EC fistulas    Signs and Symptoms (as evidenced by):   NPO and need for TPN    Nutrition Diagnosis Status:   New          Monitor and Evaluation  Food and Nutrient Intake: energy intake, parenteral nutrition intake  Food and Nutrient " Adminstration: diet order, enteral and parenteral nutrition administration  Physical Activity and Function: nutrition-related ADLs and IADLs  Anthropometric Measurements: weight, weight change  Biochemical Data, Medical Tests and Procedures: electrolyte and renal panel, gastrointestinal profile, glucose/endocrine profile, inflammatory profile, lipid profile  Nutrition-Focused Physical Findings: overall appearance, skin    Nutrition Risk  Level of Risk:  (1x/week)    Nutrition Follow-Up  RD Follow-up?: Yes

## 2018-01-12 NOTE — ASSESSMENT & PLAN NOTE
Nutrition Problem  Malnutrition    Related to (etiology):   Physiological causes/chronic illness    Signs and Symptoms (as evidenced by):   Crohn's disease, BMI of 16.9, and loss of muscle mass and subcutaneous fat    Nutrition Diagnosis Status:   New

## 2018-01-12 NOTE — SUBJECTIVE & OBJECTIVE
Subjective:     Interval History:     NAEON. No leakage from anterior abdominal ostomy pouch. Pain controlled. Ambulating, voiding. Remains NPO. But would like something to eat.    Post-Op Info:  * No surgery found *          Medications:  Continuous Infusions:   Amino acid 5% - dextrose 20% (CLINIMIX-E) solution with additives (1L provides 50 gm AA, 200 gm CHO (680 kcal/L dextrose), Na 35, K 30, Mg 5, Ca 4.5, Acetate 80, Cl 39, Phos 15) 100 mL/hr at 01/11/18 1500     Scheduled Meds:   cyanocobalamin  1,000 mcg Intramuscular Q30 Days    enoxaparin  40 mg Subcutaneous Daily    fat emulsion 20%  250 mL Intravenous Daily    folic acid (FOLVITE) IVPB 1 mg  1 mg Intravenous Daily     PRN Meds:   acetaminophen    acetaminophen    diphenhydrAMINE    hydrocodone-acetaminophen 10-325mg    hydrocodone-acetaminophen 5-325mg    omnipaque    ondansetron    promethazine (PHENERGAN) IVPB    ramelteon    sodium chloride 0.9%        Objective:     Vital Signs (Most Recent):  Temp: 98.8 °F (37.1 °C) (01/12/18 0726)  Pulse: 78 (01/12/18 0726)  Resp: 16 (01/12/18 0726)  BP: (!) 96/53 (01/12/18 0726)  SpO2: 99 % (01/12/18 0726) Vital Signs (24h Range):  Temp:  [97.3 °F (36.3 °C)-98.9 °F (37.2 °C)] 98.8 °F (37.1 °C)  Pulse:  [78-95] 78  Resp:  [16-20] 16  SpO2:  [96 %-100 %] 99 %  BP: ()/(53-56) 96/53     Intake/Output - Last 3 Shifts       01/10 0700 - 01/11 0659 01/11 0700 - 01/12 0659 01/12 0700 - 01/13 0659    Urine (mL/kg/hr) 250 1350 (0.9)     Other   50 (0.4)    Total Output 250 1350 50    Net -250 -1350 -50                 Physical Exam   Constitutional: He is oriented to person, place, and time. He appears well-developed and well-nourished. No distress.   HENT:   Head: Normocephalic and atraumatic.   Eyes: EOM are normal.   Neck: Normal range of motion.   Cardiovascular: Normal rate.    Pulmonary/Chest: Effort normal. No respiratory distress.   Abdominal: Soft. He exhibits no distension. There is no  tenderness.   Ostomy bag on anterior abdominal wall, thin brown stool in bag mostly from RLQ fistula. R flank fistula with minimal drainage.   Neurological: He is alert and oriented to person, place, and time.   Skin: Skin is warm and dry.   Psychiatric: He has a normal mood and affect. His behavior is normal. Thought content normal.     Significant Labs:  BMP (Last 3 Results):   Recent Labs  Lab 01/10/18  1701 01/11/18  0357   GLU 85 71    137   K 4.3 4.3    106   CO2 25 24   BUN 11 14   CREATININE 0.8 0.8   CALCIUM 8.4* 8.1*   MG 1.9 2.0     CBC (Last 3 Results):   Recent Labs  Lab 01/10/18  1701 01/11/18  0357   WBC 6.51 6.57   RBC 3.38* 3.29*   HGB 7.8* 7.8*   HCT 25.7* 25.8*    192   MCV 76* 78*   MCH 23.1* 23.7*   MCHC 30.4* 30.2*     CT abd/pelvis 1/11    Fistulous tract arising from the anterior margin of the ileocolonic anastomosis with associated contrast material extending into the iliacus and lateral abdominal wall musculature with cutaneous fistulous tracts in the right lower quadrant of the abdomen and right posterolateral subcutaneous tissues adjacent to the iliac bone.    Splenomegaly.

## 2018-01-12 NOTE — PLAN OF CARE
Problem: Nutrition, Parenteral (Adult)  Intervention: Monitor/Manage Parenteral Nutrition Support  Recommendations  1. Current TPN exceeding EEN and EPN, recommend decreasing to Custom TPN 5% AA/20% Dextrose at 80 mL/hr + IV lipids daily - to provide 2190 kcal/day, 96 g protein/day, and 1920 mL fluid/day (GIR = 4.37 mg/kg/min).   2. As medically appropriate, ADAT to Low Fiber/Residue with texture per SLP.   RD to monitor.    Goals: Patient to meet > 85% EEN and EPN  Nutrition Goal Status: new    Full assessment completed, see RD Progress Note 1/12/18.

## 2018-01-12 NOTE — PROGRESS NOTES
[facility]  Colorectal Surgery  Progress Note    Patient Name: Pedro Nelson  MRN: [id number]  Admission Date: 1/10/2018  Hospital Length of Stay: 2 days  Attending Physician: OKSANA Upton MD    Subjective:     Interval History:     NAEON. No leakage from anterior abdominal ostomy pouch. Pain controlled. Ambulating, voiding. Remains NPO. But would like something to eat.    Post-Op Info:  * No surgery found *          Medications:  Continuous Infusions:   Amino acid 5% - dextrose 20% (CLINIMIX-E) solution with additives (1L provides 50 gm AA, 200 gm CHO (680 kcal/L dextrose), Na 35, K 30, Mg 5, Ca 4.5, Acetate 80, Cl 39, Phos 15) 100 mL/hr at 01/11/18 1500     Scheduled Meds:   cyanocobalamin  1,000 mcg Intramuscular Q30 Days    enoxaparin  40 mg Subcutaneous Daily    fat emulsion 20%  250 mL Intravenous Daily    folic acid (FOLVITE) IVPB 1 mg  1 mg Intravenous Daily     PRN Meds:   acetaminophen    acetaminophen    diphenhydrAMINE    hydrocodone-acetaminophen 10-325mg    hydrocodone-acetaminophen 5-325mg    omnipaque    ondansetron    promethazine (PHENERGAN) IVPB    ramelteon    sodium chloride 0.9%        Objective:     Vital Signs (Most Recent):  Temp: 98.8 °F (37.1 °C) (01/12/18 0726)  Pulse: 78 (01/12/18 0726)  Resp: 16 (01/12/18 0726)  BP: (!) 96/53 (01/12/18 0726)  SpO2: 99 % (01/12/18 0726) Vital Signs (24h Range):  Temp:  [97.3 °F (36.3 °C)-98.9 °F (37.2 °C)] 98.8 °F (37.1 °C)  Pulse:  [78-95] 78  Resp:  [16-20] 16  SpO2:  [96 %-100 %] 99 %  BP: ()/(53-56) 96/53     Intake/Output - Last 3 Shifts       01/10 0700 - 01/11 0659 01/11 0700 - 01/12 0659 01/12 0700 - 01/13 0659    Urine (mL/kg/hr) 250 1350 (0.9)     Other   50 (0.4)    Total Output 250 1350 50    Net -250 -1350 -50                 Physical Exam   Constitutional: He is oriented to person, place, and time. He appears well-developed and well-nourished. No distress.   HENT:   Head: Normocephalic and  atraumatic.   Eyes: EOM are normal.   Neck: Normal range of motion.   Cardiovascular: Normal rate.    Pulmonary/Chest: Effort normal. No respiratory distress.   Abdominal: Soft. He exhibits no distension. There is no tenderness.   Ostomy bag on anterior abdominal wall, thin brown stool in bag mostly from RLQ fistula. R flank fistula with minimal drainage.   Neurological: He is alert and oriented to person, place, and time.   Skin: Skin is warm and dry.   Psychiatric: He has a normal mood and affect. His behavior is normal. Thought content normal.     Significant Labs:  BMP (Last 3 Results):   Recent Labs  Lab 01/10/18  1701 01/11/18  0357   GLU 85 71    137   K 4.3 4.3    106   CO2 25 24   BUN 11 14   CREATININE 0.8 0.8   CALCIUM 8.4* 8.1*   MG 1.9 2.0     CBC (Last 3 Results):   Recent Labs  Lab 01/10/18  1701 01/11/18  0357   WBC 6.51 6.57   RBC 3.38* 3.29*   HGB 7.8* 7.8*   HCT 25.7* 25.8*    192   MCV 76* 78*   MCH 23.1* 23.7*   MCHC 30.4* 30.2*     CT abd/pelvis 1/11    Fistulous tract arising from the anterior margin of the ileocolonic anastomosis with associated contrast material extending into the iliacus and lateral abdominal wall musculature with cutaneous fistulous tracts in the right lower quadrant of the abdomen and right posterolateral subcutaneous tissues adjacent to the iliac bone.    Splenomegaly.    Assessment/Plan:     Enterocutaneous fistula    -CT done yesterday, fistulous tracts coming from ileocolic anastomosis.  -NPO, TPN per PICC, follow nutrition labs.  -Need to record ostomy output to get a sense for whether it is high-volume. If low-volume, then may allow some PO intake.   -WOCN following.              Gagandeep Gordillo MD  Colorectal Surgery  [facility]

## 2018-01-12 NOTE — ASSESSMENT & PLAN NOTE
-CT done yesterday, fistulous tracts coming from ileocolic anastomosis.  -NPO, TPN per PICC, follow nutrition labs.  -Need to record ostomy output to get a sense for whether it is high-volume. If low-volume, then may allow some PO intake.   -WOCN following.

## 2018-01-12 NOTE — PROGRESS NOTES
Wound manager intact to anterior abdomen EC fistulas . Right lateral flank EC fistula pouch that MD applied yesterday began to leak and patient has refused for nursing to apply a new appliance. Offered to apply a new pouch but he refused again stating that he does better just with a gauze dressing to the area as nothing seems to stay intact.   Left additional supplies at bedside for the weekend in case of wound manager compromise and pre cut Shimon appliance. Nursing would just need to apply additional Shimon cohesive to build up abdominal contour and around pouch openings.  Reviewed technique with patient.  Pleased to have pouch stay on this long.  Kelin Cadena RN CWON  [id number]

## 2018-01-12 NOTE — ASSESSMENT & PLAN NOTE
Nutrition Problem  Altered GI Function    Related to (etiology):   Crohn's disease and EC fistulas    Signs and Symptoms (as evidenced by):   NPO and need for TPN    Nutrition Diagnosis Status:   New

## 2018-01-12 NOTE — PLAN OF CARE
Problem: Patient Care Overview  Goal: Plan of Care Review  Patient resting comfortably in bed. VSS. Fall precautions maintained.  No complaints at this time. Pain controlled with prn pain meds. Remains free of falls. NADN. Safety maintained. Will continue to monitor and follow plan of care. Sherita Montilla RN

## 2018-01-13 LAB
ALBUMIN SERPL BCP-MCNC: 2.4 G/DL
ALP SERPL-CCNC: 94 U/L
ALT SERPL W/O P-5'-P-CCNC: 10 U/L
ANION GAP SERPL CALC-SCNC: 6 MMOL/L
AST SERPL-CCNC: 12 U/L
BASOPHILS # BLD AUTO: 0.02 K/UL
BASOPHILS NFR BLD: 0.3 %
BILIRUB SERPL-MCNC: 0.6 MG/DL
BUN SERPL-MCNC: 12 MG/DL
CALCIUM SERPL-MCNC: 8.2 MG/DL
CHLORIDE SERPL-SCNC: 103 MMOL/L
CO2 SERPL-SCNC: 26 MMOL/L
CREAT SERPL-MCNC: 0.7 MG/DL
DIFFERENTIAL METHOD: ABNORMAL
EOSINOPHIL # BLD AUTO: 0.2 K/UL
EOSINOPHIL NFR BLD: 2.7 %
ERYTHROCYTE [DISTWIDTH] IN BLOOD BY AUTOMATED COUNT: 17.1 %
EST. GFR  (AFRICAN AMERICAN): >60 ML/MIN/1.73 M^2
EST. GFR  (NON AFRICAN AMERICAN): >60 ML/MIN/1.73 M^2
GLUCOSE SERPL-MCNC: 127 MG/DL
HCT VFR BLD AUTO: 26.8 %
HGB BLD-MCNC: 8.4 G/DL
IMM GRANULOCYTES # BLD AUTO: 0.02 K/UL
IMM GRANULOCYTES NFR BLD AUTO: 0.3 %
LYMPHOCYTES # BLD AUTO: 1.5 K/UL
LYMPHOCYTES NFR BLD: 21.4 %
MCH RBC QN AUTO: 23.9 PG
MCHC RBC AUTO-ENTMCNC: 31.3 G/DL
MCV RBC AUTO: 76 FL
MONOCYTES # BLD AUTO: 0.7 K/UL
MONOCYTES NFR BLD: 9.4 %
NEUTROPHILS # BLD AUTO: 4.7 K/UL
NEUTROPHILS NFR BLD: 65.9 %
NRBC BLD-RTO: 0 /100 WBC
PLATELET # BLD AUTO: 237 K/UL
PMV BLD AUTO: 9.2 FL
POTASSIUM SERPL-SCNC: 4.5 MMOL/L
PREALB SERPL-MCNC: 11 MG/DL
PROT SERPL-MCNC: 7.1 G/DL
RBC # BLD AUTO: 3.52 M/UL
SODIUM SERPL-SCNC: 135 MMOL/L
WBC # BLD AUTO: 7.09 K/UL

## 2018-01-13 PROCEDURE — B4185 PARENTERAL SOL 10 GM LIPIDS: HCPCS | Performed by: STUDENT IN AN ORGANIZED HEALTH CARE EDUCATION/TRAINING PROGRAM

## 2018-01-13 PROCEDURE — 11000001 HC ACUTE MED/SURG PRIVATE ROOM

## 2018-01-13 PROCEDURE — 80053 COMPREHEN METABOLIC PANEL: CPT

## 2018-01-13 PROCEDURE — 85025 COMPLETE CBC W/AUTO DIFF WBC: CPT

## 2018-01-13 PROCEDURE — 84134 ASSAY OF PREALBUMIN: CPT

## 2018-01-13 PROCEDURE — 25000003 PHARM REV CODE 250: Performed by: STUDENT IN AN ORGANIZED HEALTH CARE EDUCATION/TRAINING PROGRAM

## 2018-01-13 PROCEDURE — 25000003 PHARM REV CODE 250: Performed by: COLON & RECTAL SURGERY

## 2018-01-13 PROCEDURE — A4217 STERILE WATER/SALINE, 500 ML: HCPCS | Performed by: STUDENT IN AN ORGANIZED HEALTH CARE EDUCATION/TRAINING PROGRAM

## 2018-01-13 PROCEDURE — 63600175 PHARM REV CODE 636 W HCPCS: Performed by: STUDENT IN AN ORGANIZED HEALTH CARE EDUCATION/TRAINING PROGRAM

## 2018-01-13 PROCEDURE — 36415 COLL VENOUS BLD VENIPUNCTURE: CPT

## 2018-01-13 PROCEDURE — 25500020 PHARM REV CODE 255: Performed by: COLON & RECTAL SURGERY

## 2018-01-13 RX ADMIN — CALCIUM GLUCONATE: 94 INJECTION, SOLUTION INTRAVENOUS at 10:01

## 2018-01-13 RX ADMIN — HYDROCODONE BITARTRATE AND ACETAMINOPHEN 1 TABLET: 10; 325 TABLET ORAL at 09:01

## 2018-01-13 RX ADMIN — HYDROCODONE BITARTRATE AND ACETAMINOPHEN 1 TABLET: 10; 325 TABLET ORAL at 04:01

## 2018-01-13 RX ADMIN — SOYBEAN OIL 250 ML: 20 INJECTION, SOLUTION INTRAVENOUS at 10:01

## 2018-01-13 RX ADMIN — IOHEXOL 120 ML: 350 INJECTION, SOLUTION INTRAVENOUS at 05:01

## 2018-01-13 RX ADMIN — HYDROCODONE BITARTRATE AND ACETAMINOPHEN 1 TABLET: 10; 325 TABLET ORAL at 06:01

## 2018-01-13 RX ADMIN — HYDROCODONE BITARTRATE AND ACETAMINOPHEN 1 TABLET: 10; 325 TABLET ORAL at 12:01

## 2018-01-13 RX ADMIN — FOLIC ACID 1 MG: 5 INJECTION, SOLUTION INTRAMUSCULAR; INTRAVENOUS; SUBCUTANEOUS at 09:01

## 2018-01-13 RX ADMIN — HYDROCODONE BITARTRATE AND ACETAMINOPHEN 1 TABLET: 10; 325 TABLET ORAL at 10:01

## 2018-01-13 NOTE — ASSESSMENT & PLAN NOTE
-CT shows fistulous tracts coming from ileocolic anastomosis.  -Will obtain CT enterography today  -NPO. Can try clear liquids + boost after CT done   -TPN per PICC, follow nutrition labs.  -WOCN following.  -Likely OR for repair next week

## 2018-01-13 NOTE — PLAN OF CARE
Problem: Patient Care Overview  Goal: Plan of Care Review  Outcome: Ongoing (interventions implemented as appropriate)  Plan of care reviewed and updated. Pt AA+O. Pt's pain is managed with the medication ordered at this time. Pt's VS are as charted.  No falls this shift. Pt is oriented to room and call system. Will continue to [facility].

## 2018-01-13 NOTE — SUBJECTIVE & OBJECTIVE
Subjective:     Interval History: No issues overnight. Aferbile, vital signs stable. Pain well controlled, Low output form anterior fistula    Post-Op Info:  * No surgery found *          Medications:  Continuous Infusions:   TPN ADULT CENTRAL LINE CUSTOM 100 mL/hr at 01/12/18 2148    TPN ADULT CENTRAL LINE CUSTOM       Scheduled Meds:   cyanocobalamin  1,000 mcg Intramuscular Q30 Days    enoxaparin  40 mg Subcutaneous Daily    fat emulsion 20%  250 mL Intravenous Daily    folic acid (FOLVITE) IVPB 1 mg  1 mg Intravenous Daily     PRN Meds:   acetaminophen    acetaminophen    diphenhydrAMINE    hydrocodone-acetaminophen 10-325mg    hydrocodone-acetaminophen 5-325mg    omnipaque    ondansetron    promethazine (PHENERGAN) IVPB    ramelteon    sodium chloride 0.9%        Objective:     Vital Signs (Most Recent):  Temp: 97.4 °F (36.3 °C) (01/13/18 0859)  Pulse: 80 (01/13/18 0859)  Resp: 16 (01/13/18 0859)  BP: (!) 103/57 (01/13/18 0859)  SpO2: 100 % (01/13/18 0859) Vital Signs (24h Range):  Temp:  [97.4 °F (36.3 °C)-98.9 °F (37.2 °C)] 97.4 °F (36.3 °C)  Pulse:  [62-86] 80  Resp:  [14-18] 16  SpO2:  [97 %-100 %] 100 %  BP: ()/(55-61) 103/57     Intake/Output - Last 3 Shifts       01/11 0700 - 01/12 0659 01/12 0700 - 01/13 0659 01/13 0700 - 01/14 0659    P.O.  50     IV Piggyback  200     TPN  1449.6     Total Intake(mL/kg)  1699.6 (27.8)     Urine (mL/kg/hr) 1350 (0.9) 1650 (1.1)     Emesis/NG output  0 (0)     Other  140 (0.1)     Stool  0 (0)     Total Output 1350 1790      Net -1350 -90.4             Urine Occurrence  3 x     Stool Occurrence  0 x     Emesis Occurrence  0 x           Physical Exam   Constitutional: He is oriented to person, place, and time. He appears well-developed and well-nourished. No distress.   HENT:   Head: Normocephalic and atraumatic.   Eyes: EOM are normal.   Neck: Normal range of motion.   Cardiovascular: Normal rate.    Pulmonary/Chest: Effort normal. No respiratory  distress.   Abdominal: Soft. He exhibits no distension. There is no tenderness.   Ostomy bag on anterior abdominal wall, thin brown stool in bag mostly from RLQ fistula. R flank fistula with minimal drainage.   Neurological: He is alert and oriented to person, place, and time.   Skin: Skin is warm and dry.   Psychiatric: He has a normal mood and affect. His behavior is normal. Thought content normal.     Significant Labs:  BMP (Last 3 Results):   Recent Labs  Lab 01/10/18  1701 01/11/18 0357 01/13/18  0606   GLU 85 71 127*    137 135*   K 4.3 4.3 4.5    106 103   CO2 25 24 26   BUN 11 14 12   CREATININE 0.8 0.8 0.7   CALCIUM 8.4* 8.1* 8.2*   MG 1.9 2.0  --      CBC (Last 3 Results):   Recent Labs  Lab 01/10/18  1701 01/11/18 0357 01/13/18  0606   WBC 6.51 6.57 7.09   RBC 3.38* 3.29* 3.52*   HGB 7.8* 7.8* 8.4*   HCT 25.7* 25.8* 26.8*    192 237   MCV 76* 78* 76*   MCH 23.1* 23.7* 23.9*   MCHC 30.4* 30.2* 31.3*     Microbiology Results (last 7 days)     ** No results found for the last 168 hours. **        Prealbumin (Last 3 Results):   Recent Labs  Lab 01/10/18  1701 01/13/18  0619   PREALBUMIN 12* 11*       Significant Diagnostics:  I have reviewed all pertinent imaging results/findings within the past 24 hours.

## 2018-01-13 NOTE — PLAN OF CARE
Problem: Patient Care Overview  Goal: Plan of Care Review  Outcome: Ongoing (interventions implemented as appropriate)  Pt continues with plan of care. AAO x 4 VSS stable pain managed with oral pain medication. Q 2 hour monitoring for pain and safety. Whiteboard updated. Call light in reach.

## 2018-01-13 NOTE — NURSING
Right hip fistula covered with gauze, changed one time today, small amount of drainage. Sherita Montilla RN

## 2018-01-13 NOTE — PLAN OF CARE
Problem: Patient Care Overview  Goal: Plan of Care Review  Outcome: Ongoing (interventions implemented as appropriate)  Patient resting comfortably in bed. VSS. Fall precautions maintained.  No complaints at this time. Pain controlled with prn pain meds. Remains free of falls. NADN. Safety maintained. Will continue to monitor and follow plan of care. Sherita Montilla RN

## 2018-01-13 NOTE — PROGRESS NOTES
[facility]  Colorectal Surgery  Progress Note    Patient Name: Pedro Nelson  MRN: [id number]  Admission Date: 1/10/2018  Hospital Length of Stay: 3 days  Attending Physician: OKSANA Upton MD    Subjective:     Interval History: No issues overnight. Aferbile, vital signs stable. Pain well controlled, Low output form anterior fistula    Post-Op Info:  * No surgery found *          Medications:  Continuous Infusions:   TPN ADULT CENTRAL LINE CUSTOM 100 mL/hr at 01/12/18 2148    TPN ADULT CENTRAL LINE CUSTOM       Scheduled Meds:   cyanocobalamin  1,000 mcg Intramuscular Q30 Days    enoxaparin  40 mg Subcutaneous Daily    fat emulsion 20%  250 mL Intravenous Daily    folic acid (FOLVITE) IVPB 1 mg  1 mg Intravenous Daily     PRN Meds:   acetaminophen    acetaminophen    diphenhydrAMINE    hydrocodone-acetaminophen 10-325mg    hydrocodone-acetaminophen 5-325mg    omnipaque    ondansetron    promethazine (PHENERGAN) IVPB    ramelteon    sodium chloride 0.9%        Objective:     Vital Signs (Most Recent):  Temp: 97.4 °F (36.3 °C) (01/13/18 0859)  Pulse: 80 (01/13/18 0859)  Resp: 16 (01/13/18 0859)  BP: (!) 103/57 (01/13/18 0859)  SpO2: 100 % (01/13/18 0859) Vital Signs (24h Range):  Temp:  [97.4 °F (36.3 °C)-98.9 °F (37.2 °C)] 97.4 °F (36.3 °C)  Pulse:  [62-86] 80  Resp:  [14-18] 16  SpO2:  [97 %-100 %] 100 %  BP: ()/(55-61) 103/57     Intake/Output - Last 3 Shifts       01/11 0700 - 01/12 0659 01/12 0700 - 01/13 0659 01/13 0700 - 01/14 0659    P.O.  50     IV Piggyback  200     TPN  1449.6     Total Intake(mL/kg)  1699.6 (27.8)     Urine (mL/kg/hr) 1350 (0.9) 1650 (1.1)     Emesis/NG output  0 (0)     Other  140 (0.1)     Stool  0 (0)     Total Output 1350 1790      Net -1350 -90.4             Urine Occurrence  3 x     Stool Occurrence  0 x     Emesis Occurrence  0 x           Physical Exam   Constitutional: He is oriented to person, place, and time. He appears well-developed  and well-nourished. No distress.   HENT:   Head: Normocephalic and atraumatic.   Eyes: EOM are normal.   Neck: Normal range of motion.   Cardiovascular: Normal rate.    Pulmonary/Chest: Effort normal. No respiratory distress.   Abdominal: Soft. He exhibits no distension. There is no tenderness.   Ostomy bag on anterior abdominal wall, thin brown stool in bag mostly from RLQ fistula. R flank fistula with minimal drainage.   Neurological: He is alert and oriented to person, place, and time.   Skin: Skin is warm and dry.   Psychiatric: He has a normal mood and affect. His behavior is normal. Thought content normal.     Significant Labs:  BMP (Last 3 Results):   Recent Labs  Lab 01/10/18  1701 01/11/18  0357 01/13/18  0606   GLU 85 71 127*    137 135*   K 4.3 4.3 4.5    106 103   CO2 25 24 26   BUN 11 14 12   CREATININE 0.8 0.8 0.7   CALCIUM 8.4* 8.1* 8.2*   MG 1.9 2.0  --      CBC (Last 3 Results):   Recent Labs  Lab 01/10/18  1701 01/11/18  0357 01/13/18  0606   WBC 6.51 6.57 7.09   RBC 3.38* 3.29* 3.52*   HGB 7.8* 7.8* 8.4*   HCT 25.7* 25.8* 26.8*    192 237   MCV 76* 78* 76*   MCH 23.1* 23.7* 23.9*   MCHC 30.4* 30.2* 31.3*     Microbiology Results (last 7 days)     ** No results found for the last 168 hours. **        Prealbumin (Last 3 Results):   Recent Labs  Lab 01/10/18  1701 01/13/18  0619   PREALBUMIN 12* 11*       Significant Diagnostics:  I have reviewed all pertinent imaging results/findings within the past 24 hours.    Assessment/Plan:     Malnutrition    Cont TPN        Enterocutaneous fistula    -CT shows fistulous tracts coming from ileocolic anastomosis.  -Will obtain CT enterography today  -NPO. Can try clear liquids + boost after CT done   -TPN per PICC, follow nutrition labs.  -WOCN following.  -Likely OR for repair next week              Reed Foster MD  Colorectal Surgery  [facility]

## 2018-01-14 LAB
ALBUMIN SERPL BCP-MCNC: 2.8 G/DL
ALP SERPL-CCNC: 113 U/L
ALT SERPL W/O P-5'-P-CCNC: 13 U/L
ANION GAP SERPL CALC-SCNC: 8 MMOL/L
AST SERPL-CCNC: 13 U/L
BASOPHILS # BLD AUTO: 0.02 K/UL
BASOPHILS NFR BLD: 0.2 %
BILIRUB SERPL-MCNC: 0.8 MG/DL
BUN SERPL-MCNC: 12 MG/DL
CALCIUM SERPL-MCNC: 9 MG/DL
CHLORIDE SERPL-SCNC: 98 MMOL/L
CO2 SERPL-SCNC: 26 MMOL/L
CREAT SERPL-MCNC: 0.8 MG/DL
DIFFERENTIAL METHOD: ABNORMAL
EOSINOPHIL # BLD AUTO: 0.1 K/UL
EOSINOPHIL NFR BLD: 1.1 %
ERYTHROCYTE [DISTWIDTH] IN BLOOD BY AUTOMATED COUNT: 16.9 %
EST. GFR  (AFRICAN AMERICAN): >60 ML/MIN/1.73 M^2
EST. GFR  (NON AFRICAN AMERICAN): >60 ML/MIN/1.73 M^2
GLUCOSE SERPL-MCNC: 130 MG/DL
HCT VFR BLD AUTO: 29.4 %
HGB BLD-MCNC: 9.3 G/DL
IMM GRANULOCYTES # BLD AUTO: 0.06 K/UL
IMM GRANULOCYTES NFR BLD AUTO: 0.5 %
LYMPHOCYTES # BLD AUTO: 0.6 K/UL
LYMPHOCYTES NFR BLD: 5.3 %
MCH RBC QN AUTO: 23.7 PG
MCHC RBC AUTO-ENTMCNC: 31.6 G/DL
MCV RBC AUTO: 75 FL
MONOCYTES # BLD AUTO: 0.8 K/UL
MONOCYTES NFR BLD: 6.8 %
NEUTROPHILS # BLD AUTO: 9.8 K/UL
NEUTROPHILS NFR BLD: 86.1 %
NRBC BLD-RTO: 0 /100 WBC
PLATELET # BLD AUTO: 265 K/UL
PMV BLD AUTO: 10.1 FL
POTASSIUM SERPL-SCNC: 4.5 MMOL/L
PROT SERPL-MCNC: 8.1 G/DL
RBC # BLD AUTO: 3.92 M/UL
SODIUM SERPL-SCNC: 132 MMOL/L
WBC # BLD AUTO: 11.43 K/UL

## 2018-01-14 PROCEDURE — 11000001 HC ACUTE MED/SURG PRIVATE ROOM

## 2018-01-14 PROCEDURE — 63600175 PHARM REV CODE 636 W HCPCS: Performed by: STUDENT IN AN ORGANIZED HEALTH CARE EDUCATION/TRAINING PROGRAM

## 2018-01-14 PROCEDURE — A4217 STERILE WATER/SALINE, 500 ML: HCPCS | Performed by: STUDENT IN AN ORGANIZED HEALTH CARE EDUCATION/TRAINING PROGRAM

## 2018-01-14 PROCEDURE — S0030 INJECTION, METRONIDAZOLE: HCPCS | Performed by: COLON & RECTAL SURGERY

## 2018-01-14 PROCEDURE — 25000003 PHARM REV CODE 250: Performed by: COLON & RECTAL SURGERY

## 2018-01-14 PROCEDURE — 80053 COMPREHEN METABOLIC PANEL: CPT

## 2018-01-14 PROCEDURE — 25000003 PHARM REV CODE 250: Performed by: STUDENT IN AN ORGANIZED HEALTH CARE EDUCATION/TRAINING PROGRAM

## 2018-01-14 PROCEDURE — 85025 COMPLETE CBC W/AUTO DIFF WBC: CPT

## 2018-01-14 PROCEDURE — 63600175 PHARM REV CODE 636 W HCPCS: Performed by: COLON & RECTAL SURGERY

## 2018-01-14 PROCEDURE — B4185 PARENTERAL SOL 10 GM LIPIDS: HCPCS | Performed by: STUDENT IN AN ORGANIZED HEALTH CARE EDUCATION/TRAINING PROGRAM

## 2018-01-14 PROCEDURE — 36415 COLL VENOUS BLD VENIPUNCTURE: CPT

## 2018-01-14 RX ORDER — CIPROFLOXACIN 2 MG/ML
400 INJECTION, SOLUTION INTRAVENOUS
Status: DISCONTINUED | OUTPATIENT
Start: 2018-01-14 | End: 2018-01-23

## 2018-01-14 RX ORDER — METRONIDAZOLE 500 MG/100ML
500 INJECTION, SOLUTION INTRAVENOUS
Status: DISCONTINUED | OUTPATIENT
Start: 2018-01-14 | End: 2018-01-23

## 2018-01-14 RX ADMIN — CALCIUM GLUCONATE: 94 INJECTION, SOLUTION INTRAVENOUS at 10:01

## 2018-01-14 RX ADMIN — ACETAMINOPHEN 650 MG: 325 TABLET ORAL at 08:01

## 2018-01-14 RX ADMIN — HYDROCODONE BITARTRATE AND ACETAMINOPHEN 1 TABLET: 10; 325 TABLET ORAL at 08:01

## 2018-01-14 RX ADMIN — FOLIC ACID 1 MG: 5 INJECTION, SOLUTION INTRAMUSCULAR; INTRAVENOUS; SUBCUTANEOUS at 11:01

## 2018-01-14 RX ADMIN — SOYBEAN OIL 250 ML: 20 INJECTION, SOLUTION INTRAVENOUS at 10:01

## 2018-01-14 RX ADMIN — METRONIDAZOLE 500 MG: 500 INJECTION, SOLUTION INTRAVENOUS at 08:01

## 2018-01-14 RX ADMIN — METRONIDAZOLE 500 MG: 500 INJECTION, SOLUTION INTRAVENOUS at 01:01

## 2018-01-14 RX ADMIN — HYDROCODONE BITARTRATE AND ACETAMINOPHEN 1 TABLET: 10; 325 TABLET ORAL at 04:01

## 2018-01-14 RX ADMIN — ACETAMINOPHEN 650 MG: 325 TABLET ORAL at 05:01

## 2018-01-14 RX ADMIN — HYDROCODONE BITARTRATE AND ACETAMINOPHEN 1 TABLET: 10; 325 TABLET ORAL at 11:01

## 2018-01-14 RX ADMIN — CIPROFLOXACIN 400 MG: 2 INJECTION, SOLUTION INTRAVENOUS at 01:01

## 2018-01-14 NOTE — PROGRESS NOTES
[facility]  Colorectal Surgery  Progress Note    Patient Name: Pedro Nelson  MRN: [id number]  Admission Date: 1/10/2018  Hospital Length of Stay: 4 days  Attending Physician: OKSAAN Upton MD    Subjective:     Interval History: No issues overnight. One time fever of 101 this morning. Vital signs stable. CT enterography yesterday.     Post-Op Info:  * No surgery found *          Medications:  Continuous Infusions:   TPN ADULT CENTRAL LINE CUSTOM 100 mL/hr at 01/13/18 2219    TPN ADULT CENTRAL LINE CUSTOM       Scheduled Meds:   cyanocobalamin  1,000 mcg Intramuscular Q30 Days    enoxaparin  40 mg Subcutaneous Daily    fat emulsion 20%  250 mL Intravenous Daily    fat emulsion 20%  250 mL Intravenous Daily    folic acid (FOLVITE) IVPB 1 mg  1 mg Intravenous Daily     PRN Meds:   acetaminophen    acetaminophen    barium    barium    diphenhydrAMINE    hydrocodone-acetaminophen 10-325mg    hydrocodone-acetaminophen 5-325mg    omnipaque    ondansetron    promethazine (PHENERGAN) IVPB    ramelteon    sodium chloride 0.9%        Objective:     Vital Signs (Most Recent):  Temp: 99.6 °F (37.6 °C) (01/14/18 0828)  Pulse: 96 (01/14/18 0828)  Resp: 16 (01/14/18 0828)  BP: (!) 95/55 (01/14/18 0828)  SpO2: (!) 92 % (01/14/18 0413) Vital Signs (24h Range):  Temp:  [97.9 °F (36.6 °C)-101 °F (38.3 °C)] 99.6 °F (37.6 °C)  Pulse:  [79-98] 96  Resp:  [14-16] 16  SpO2:  [92 %-100 %] 92 %  BP: ()/(50-59) 95/55     Intake/Output - Last 3 Shifts       01/12 0700 - 01/13 0659 01/13 0700 - 01/14 0659 01/14 0700 - 01/15 0659    P.O. 50 160     IV Piggyback 200      TPN 1449.6 807.3     Total Intake(mL/kg) 1699.6 (27.8) 967.3 (15.8)     Urine (mL/kg/hr) 1650 (1.1) 1075 (0.7)     Emesis/NG output 0 (0)      Other 140 (0.1) 230 (0.2)     Stool 0 (0)      Total Output 1790 1305      Net -90.4 -337.7             Urine Occurrence 3 x 2 x     Stool Occurrence 0 x 0 x     Emesis Occurrence 0 x 0 x            Physical Exam   Constitutional: He is oriented to person, place, and time. He appears well-developed and well-nourished. No distress.   HENT:   Head: Normocephalic and atraumatic.   Eyes: EOM are normal.   Neck: Normal range of motion.   Cardiovascular: Normal rate.    Pulmonary/Chest: Effort normal. No respiratory distress.   Abdominal: Soft. He exhibits no distension. There is no tenderness.   Ostomy bag on anterior abdominal wall, thin brown stool in bag mostly from RLQ fistula. R flank fistula with minimal drainage.   Neurological: He is alert and oriented to person, place, and time.   Skin: Skin is warm and dry.   Psychiatric: He has a normal mood and affect. His behavior is normal. Thought content normal.     Significant Labs:  BMP (Last 3 Results):   Recent Labs  Lab 01/10/18  1701 01/11/18  0357 01/13/18  0606 01/14/18  0541   GLU 85 71 127* 130*    137 135* 132*   K 4.3 4.3 4.5 4.5    106 103 98   CO2 25 24 26 26   BUN 11 14 12 12   CREATININE 0.8 0.8 0.7 0.8   CALCIUM 8.4* 8.1* 8.2* 9.0   MG 1.9 2.0  --   --      CBC (Last 3 Results):   Recent Labs  Lab 01/11/18  0357 01/13/18  0606 01/14/18  0541   WBC 6.57 7.09 11.43   RBC 3.29* 3.52* 3.92*   HGB 7.8* 8.4* 9.3*   HCT 25.8* 26.8* 29.4*    237 265   MCV 78* 76* 75*   MCH 23.7* 23.9* 23.7*   MCHC 30.2* 31.3* 31.6*     LFTs:   Recent Labs  Lab 01/14/18  0541   ALT 13   AST 13   ALKPHOS 113   BILITOT 0.8   PROT 8.1   ALBUMIN 2.8*     Microbiology Results (last 7 days)     ** No results found for the last 168 hours. **          Significant Diagnostics:  I have reviewed all pertinent imaging results/findings within the past 24 hours.    Assessment/Plan:     Malnutrition    Cont TPN        Enterocutaneous fistula    -CT enterography revealing multiple fistulas  -can have CLD + boost  -TPN per PICC, follow nutrition labs.  -WOCN following.  -Likely OR for repair next week              Reed Foster MD  Colorectal Surgery  [facility]  [place]

## 2018-01-14 NOTE — ASSESSMENT & PLAN NOTE
-CT enterography revealing multiple fistulas  -can have CLD + boost  -TPN per PICC, follow nutrition labs.  -WOCN following.  -Likely OR for repair next week

## 2018-01-14 NOTE — SUBJECTIVE & OBJECTIVE
Subjective:     Interval History: No issues overnight. One time fever of 101 this morning. Vital signs stable. CT enterography yesterday.     Post-Op Info:  * No surgery found *          Medications:  Continuous Infusions:   TPN ADULT CENTRAL LINE CUSTOM 100 mL/hr at 01/13/18 2219    TPN ADULT CENTRAL LINE CUSTOM       Scheduled Meds:   cyanocobalamin  1,000 mcg Intramuscular Q30 Days    enoxaparin  40 mg Subcutaneous Daily    fat emulsion 20%  250 mL Intravenous Daily    fat emulsion 20%  250 mL Intravenous Daily    folic acid (FOLVITE) IVPB 1 mg  1 mg Intravenous Daily     PRN Meds:   acetaminophen    acetaminophen    barium    barium    diphenhydrAMINE    hydrocodone-acetaminophen 10-325mg    hydrocodone-acetaminophen 5-325mg    omnipaque    ondansetron    promethazine (PHENERGAN) IVPB    ramelteon    sodium chloride 0.9%        Objective:     Vital Signs (Most Recent):  Temp: 99.6 °F (37.6 °C) (01/14/18 0828)  Pulse: 96 (01/14/18 0828)  Resp: 16 (01/14/18 0828)  BP: (!) 95/55 (01/14/18 0828)  SpO2: (!) 92 % (01/14/18 0413) Vital Signs (24h Range):  Temp:  [97.9 °F (36.6 °C)-101 °F (38.3 °C)] 99.6 °F (37.6 °C)  Pulse:  [79-98] 96  Resp:  [14-16] 16  SpO2:  [92 %-100 %] 92 %  BP: ()/(50-59) 95/55     Intake/Output - Last 3 Shifts       01/12 0700 - 01/13 0659 01/13 0700 - 01/14 0659 01/14 0700 - 01/15 0659    P.O. 50 160     IV Piggyback 200      TPN 1449.6 807.3     Total Intake(mL/kg) 1699.6 (27.8) 967.3 (15.8)     Urine (mL/kg/hr) 1650 (1.1) 1075 (0.7)     Emesis/NG output 0 (0)      Other 140 (0.1) 230 (0.2)     Stool 0 (0)      Total Output 1790 1305      Net -90.4 -337.7             Urine Occurrence 3 x 2 x     Stool Occurrence 0 x 0 x     Emesis Occurrence 0 x 0 x           Physical Exam   Constitutional: He is oriented to person, place, and time. He appears well-developed and well-nourished. No distress.   HENT:   Head: Normocephalic and atraumatic.   Eyes: EOM are normal.    Neck: Normal range of motion.   Cardiovascular: Normal rate.    Pulmonary/Chest: Effort normal. No respiratory distress.   Abdominal: Soft. He exhibits no distension. There is no tenderness.   Ostomy bag on anterior abdominal wall, thin brown stool in bag mostly from RLQ fistula. R flank fistula with minimal drainage.   Neurological: He is alert and oriented to person, place, and time.   Skin: Skin is warm and dry.   Psychiatric: He has a normal mood and affect. His behavior is normal. Thought content normal.     Significant Labs:  BMP (Last 3 Results):   Recent Labs  Lab 01/10/18  1701 01/11/18  0357 01/13/18  0606 01/14/18  0541   GLU 85 71 127* 130*    137 135* 132*   K 4.3 4.3 4.5 4.5    106 103 98   CO2 25 24 26 26   BUN 11 14 12 12   CREATININE 0.8 0.8 0.7 0.8   CALCIUM 8.4* 8.1* 8.2* 9.0   MG 1.9 2.0  --   --      CBC (Last 3 Results):   Recent Labs  Lab 01/11/18  0357 01/13/18  0606 01/14/18  0541   WBC 6.57 7.09 11.43   RBC 3.29* 3.52* 3.92*   HGB 7.8* 8.4* 9.3*   HCT 25.8* 26.8* 29.4*    237 265   MCV 78* 76* 75*   MCH 23.7* 23.9* 23.7*   MCHC 30.2* 31.3* 31.6*     LFTs:   Recent Labs  Lab 01/14/18  0541   ALT 13   AST 13   ALKPHOS 113   BILITOT 0.8   PROT 8.1   ALBUMIN 2.8*     Microbiology Results (last 7 days)     ** No results found for the last 168 hours. **          Significant Diagnostics:  I have reviewed all pertinent imaging results/findings within the past 24 hours.

## 2018-01-15 PROBLEM — E46 MALNUTRITION: Status: ACTIVE | Noted: 2018-01-15

## 2018-01-15 LAB
ALBUMIN SERPL BCP-MCNC: 2.7 G/DL
ALP SERPL-CCNC: 116 U/L
ALT SERPL W/O P-5'-P-CCNC: 17 U/L
ANION GAP SERPL CALC-SCNC: 8 MMOL/L
AST SERPL-CCNC: 24 U/L
BASOPHILS # BLD AUTO: 0.03 K/UL
BASOPHILS NFR BLD: 0.5 %
BILIRUB SERPL-MCNC: 0.6 MG/DL
BUN SERPL-MCNC: 16 MG/DL
CALCIUM SERPL-MCNC: 8.8 MG/DL
CHLORIDE SERPL-SCNC: 99 MMOL/L
CO2 SERPL-SCNC: 27 MMOL/L
CREAT SERPL-MCNC: 0.8 MG/DL
DIFFERENTIAL METHOD: ABNORMAL
EOSINOPHIL # BLD AUTO: 0.3 K/UL
EOSINOPHIL NFR BLD: 4.2 %
ERYTHROCYTE [DISTWIDTH] IN BLOOD BY AUTOMATED COUNT: 16.9 %
EST. GFR  (AFRICAN AMERICAN): >60 ML/MIN/1.73 M^2
EST. GFR  (NON AFRICAN AMERICAN): >60 ML/MIN/1.73 M^2
GLUCOSE SERPL-MCNC: 114 MG/DL
HCT VFR BLD AUTO: 29.2 %
HGB BLD-MCNC: 9.2 G/DL
IMM GRANULOCYTES # BLD AUTO: 0.01 K/UL
IMM GRANULOCYTES NFR BLD AUTO: 0.2 %
LYMPHOCYTES # BLD AUTO: 1.4 K/UL
LYMPHOCYTES NFR BLD: 22.9 %
MCH RBC QN AUTO: 24 PG
MCHC RBC AUTO-ENTMCNC: 31.5 G/DL
MCV RBC AUTO: 76 FL
MONOCYTES # BLD AUTO: 0.7 K/UL
MONOCYTES NFR BLD: 11.9 %
NEUTROPHILS # BLD AUTO: 3.6 K/UL
NEUTROPHILS NFR BLD: 60.3 %
NRBC BLD-RTO: 0 /100 WBC
PHOSPHATE SERPL-MCNC: 3.6 MG/DL
PLATELET # BLD AUTO: 255 K/UL
PMV BLD AUTO: 9.7 FL
POCT GLUCOSE: 108 MG/DL (ref 70–110)
POCT GLUCOSE: 73 MG/DL (ref 70–110)
POTASSIUM SERPL-SCNC: 4.4 MMOL/L
PROT SERPL-MCNC: 7.8 G/DL
RBC # BLD AUTO: 3.83 M/UL
SODIUM SERPL-SCNC: 134 MMOL/L
WBC # BLD AUTO: 5.97 K/UL

## 2018-01-15 PROCEDURE — 36415 COLL VENOUS BLD VENIPUNCTURE: CPT

## 2018-01-15 PROCEDURE — S0030 INJECTION, METRONIDAZOLE: HCPCS | Performed by: COLON & RECTAL SURGERY

## 2018-01-15 PROCEDURE — 85025 COMPLETE CBC W/AUTO DIFF WBC: CPT

## 2018-01-15 PROCEDURE — 25000003 PHARM REV CODE 250: Performed by: COLON & RECTAL SURGERY

## 2018-01-15 PROCEDURE — 11000001 HC ACUTE MED/SURG PRIVATE ROOM

## 2018-01-15 PROCEDURE — 84100 ASSAY OF PHOSPHORUS: CPT

## 2018-01-15 PROCEDURE — 80053 COMPREHEN METABOLIC PANEL: CPT

## 2018-01-15 PROCEDURE — 25000003 PHARM REV CODE 250: Performed by: STUDENT IN AN ORGANIZED HEALTH CARE EDUCATION/TRAINING PROGRAM

## 2018-01-15 PROCEDURE — 63600175 PHARM REV CODE 636 W HCPCS: Performed by: COLON & RECTAL SURGERY

## 2018-01-15 PROCEDURE — 99232 SBSQ HOSP IP/OBS MODERATE 35: CPT | Mod: ,,, | Performed by: COLON & RECTAL SURGERY

## 2018-01-15 RX ADMIN — METRONIDAZOLE 500 MG: 500 INJECTION, SOLUTION INTRAVENOUS at 09:01

## 2018-01-15 RX ADMIN — HYDROCODONE BITARTRATE AND ACETAMINOPHEN 1 TABLET: 10; 325 TABLET ORAL at 11:01

## 2018-01-15 RX ADMIN — CIPROFLOXACIN 400 MG: 2 INJECTION, SOLUTION INTRAVENOUS at 01:01

## 2018-01-15 RX ADMIN — HYDROCODONE BITARTRATE AND ACETAMINOPHEN 1 TABLET: 10; 325 TABLET ORAL at 06:01

## 2018-01-15 RX ADMIN — CIPROFLOXACIN 400 MG: 2 INJECTION, SOLUTION INTRAVENOUS at 02:01

## 2018-01-15 RX ADMIN — FOLIC ACID 1 MG: 5 INJECTION, SOLUTION INTRAMUSCULAR; INTRAVENOUS; SUBCUTANEOUS at 08:01

## 2018-01-15 RX ADMIN — METRONIDAZOLE 500 MG: 500 INJECTION, SOLUTION INTRAVENOUS at 05:01

## 2018-01-15 RX ADMIN — HYDROCODONE BITARTRATE AND ACETAMINOPHEN 1 TABLET: 10; 325 TABLET ORAL at 10:01

## 2018-01-15 RX ADMIN — HYDROCODONE BITARTRATE AND ACETAMINOPHEN 1 TABLET: 10; 325 TABLET ORAL at 02:01

## 2018-01-15 RX ADMIN — HYDROCODONE BITARTRATE AND ACETAMINOPHEN 1 TABLET: 10; 325 TABLET ORAL at 05:01

## 2018-01-15 RX ADMIN — METRONIDAZOLE 500 MG: 500 INJECTION, SOLUTION INTRAVENOUS at 02:01

## 2018-01-15 NOTE — PROGRESS NOTES
"On assessment, pt PICC looks pulled out. TPN stopped. Line flushes but pt states "it feels like its in my shoulder." Colorectal team notified. Chest xray ordered. Accuchek ordered for now and in four hours.   "

## 2018-01-15 NOTE — HOSPITAL COURSE
Hx CD s/p ileocecectomy and SBR 5/2017 now w ECF RLQ and R flank.    1/11 - CT w fistula from [place]-terminal ileum. WOCN placed stoma appliances. NPO. TPN.  1/13 - CT enterography. Start CLD after    1/18 - OR

## 2018-01-15 NOTE — PROGRESS NOTES
[facility]  Colorectal Surgery  Progress Note    Patient Name: Pedro Nelson  MRN: [id number]  Admission Date: 1/10/2018  Hospital Length of Stay: 5 days  Attending Physician: OKSANA Upton MD    Subjective:     Interval History:     NAEON. Afebrile. Pain controlled. Ostomy bag intact without leaking. Ambulating. Voiding.    Post-Op Info:  * No surgery found *          Medications:  Continuous Infusions:   TPN ADULT CENTRAL LINE CUSTOM 100 mL/hr at 01/14/18 2236     Scheduled Meds:   ciprofloxacin  400 mg Intravenous Q12H    cyanocobalamin  1,000 mcg Intramuscular Q30 Days    enoxaparin  40 mg Subcutaneous Daily    fat emulsion 20%  250 mL Intravenous Daily    folic acid (FOLVITE) IVPB 1 mg  1 mg Intravenous Daily    metronidazole  500 mg Intravenous Q8H     PRN Meds:   acetaminophen    acetaminophen    barium    barium    diphenhydrAMINE    hydrocodone-acetaminophen 10-325mg    hydrocodone-acetaminophen 5-325mg    omnipaque    ondansetron    promethazine (PHENERGAN) IVPB    ramelteon    sodium chloride 0.9%        Objective:     Vital Signs (Most Recent):  Temp: 97.6 °F (36.4 °C) (01/15/18 0758)  Pulse: 77 (01/15/18 0758)  Resp: 16 (01/15/18 0411)  BP: (!) 96/58 (01/15/18 0758)  SpO2: 99 % (01/15/18 0758) Vital Signs (24h Range):  Temp:  [96.6 °F (35.9 °C)-101.3 °F (38.5 °C)] 97.6 °F (36.4 °C)  Pulse:  [] 77  Resp:  [16-18] 16  SpO2:  [97 %-100 %] 99 %  BP: ()/(51-59) 96/58     Intake/Output - Last 3 Shifts       01/13 0700 - 01/14 0659 01/14 0700 - 01/15 0659 01/15 0700 - 01/16 0659    P.O. 160 1560     IV Piggyback       .3 1225     Total Intake(mL/kg) 967.3 (15.8) 2785 (45.6)     Urine (mL/kg/hr) 1075 (0.7) 1600 (1.1)     Emesis/NG output       Other 230 (0.2) 200 (0.1)     Stool       Total Output 1305 1800      Net -337.7 +985             Urine Occurrence 2 x 12 x     Stool Occurrence 0 x      Emesis Occurrence 0 x            Physical Exam    Constitutional: He is oriented to person, place, and time. He appears well-developed and well-nourished. No distress.   HENT:   Head: Normocephalic and atraumatic.   Eyes: EOM are normal.   Neck: Normal range of motion.   Cardiovascular: Normal rate.    Pulmonary/Chest: Effort normal. No respiratory distress.   Abdominal: Soft. He exhibits no distension. There is no tenderness.   Ostomy bag on anterior abdominal wall, thin brown stool in bag mostly from RLQ fistula. R flank fistula with minimal drainage.   Neurological: He is alert and oriented to person, place, and time.   Skin: Skin is warm and dry.   Psychiatric: He has a normal mood and affect. His behavior is normal. Thought content normal.     Significant Labs:  BMP (Last 3 Results):   Recent Labs  Lab 01/10/18  1701 01/11/18  0357 01/13/18  0606 01/14/18  0541 01/15/18  0539   GLU 85 71 127* 130* 114*    137 135* 132* 134*   K 4.3 4.3 4.5 4.5 4.4    106 103 98 99   CO2 25 24 26 26 27   BUN 11 14 12 12 16   CREATININE 0.8 0.8 0.7 0.8 0.8   CALCIUM 8.4* 8.1* 8.2* 9.0 8.8   MG 1.9 2.0  --   --   --      CBC (Last 3 Results):   Recent Labs  Lab 01/13/18  0606 01/14/18  0541 01/15/18  0539   WBC 7.09 11.43 5.97   RBC 3.52* 3.92* 3.83*   HGB 8.4* 9.3* 9.2*   HCT 26.8* 29.4* 29.2*    265 255   MCV 76* 75* 76*   MCH 23.9* 23.7* 24.0*   MCHC 31.3* 31.6* 31.5*     Assessment/Plan:     * Enterocutaneous fistula    -Continue PO diet, no significant increase in fistula output. TPN per PICC, follow nutrition labs.  -ABx added this weekend for fever, WBC normal, afebrile now.  -WOCN following.  -Tentative plans for surgery Thursday.        Malnutrition    Cont TPN/diet              Gagandeep Gordillo MD  Colorectal Surgery  [facility]      I have personally obtained a history and performed a physical exam with and independent to my resident and discussed the findings and plan with the patient.  I agree with the above findings and plan  with the following exceptions:  None    H, Benjamin Upton MD, FACS, FASCRS  Staff Surgeon  Dept of Colon and Rectal Surgery  [facility] [place], [place]

## 2018-01-15 NOTE — HPI
HPI  41 yo male with Crohn's ileitis, fistulizing.  Underwent resection 5/ 2017. Did well initially after surgery.  D/c from hospital tolerating diet, moving bowels.  Returned to [place].      Two weeks after d/c pt began leaking from midline wound.  Managed at local hospital.    TPN antibiotics.   Never operated on there.    Now leaking from all prior EC fistula sites - midline wound, right lower quadrant, and right flank.    Currently no nausea or vomiting.  Tolerating some PO.  BMs qod.  No blood.  TPN last given two days ago.  No fever.    Last biologic Humira - last dose - 40 u qow.  Last dose last week.

## 2018-01-15 NOTE — PHYSICIAN QUERY
"PT Name: Pedro Nelson  MR #: [id number]    Physician Query Form - Nutrition Clarification     CDS/: Melodie Wiseman               Contact information: [email or web address]    This form is a permanent document in the medical record.     Query Date: January 15, 2018    By submitting this query, we are merely seeking further clarification of documentation.. Please utilize your independent clinical judgment when addressing the question(s) below.    The Medical record contains the following:   Indicators  Supporting Clinical Findings Location in Medical Record    % of Estimated Energy Intake over a time frame from p.o., TF, or TPN      Weight Status over a time frame     x Subcutaneous Fat and/or Muscle Loss Underweight, generalized wasting  Loss of muscle mass and subcutaneous fat Dietitian PN 01/12    Fluid Accumulation or Edema      Reduced  Strength     x Wt / BMI / Usual Body Weight Height: 6' 3" (190.5 cm)  Weight Method: Bed Scale  Weight: 61.1 kg (134 lb 11.2 oz)  BMI (Calculated): 16.9 Dietitian PN 01/12    Delayed Wound Healing / Failure to Thrive     x Acute or Chronic Illness Enterocutaneous Fistula  CT shows fistulous tracts coming from ileocolic anastomosis  Malnutrition CRS PN 01/13    Medication     x Treatment TPN per PICC  Optimize nutrition    Full liquid diet  Boost Plus Supplement All Meals    Follow nutrition labs CRS PN 01/11      Physician Orders        CRS PN 01/13     x Other Prealbumin= 12 - 11 Labs, Chem Profile 01/11 @ 1791 & 01/13 @ 0619     AND / ASPEN Clinical Characteristics (October 2011)  A minimum of two characteristics is recommended for diagnosing either moderate or severe malnutrition   Mild Malnutrition Moderate Malnutrition Severe Malnutrition   Energy Intake from p.o., TF or TPN. < 75% intake of estimated energy needs for less than 7 days < 75% intake of estimated energy needs for greater than 7 days < 50% intake of estimated energy needs for > 5 days   Weight " Loss 1-2% in 1 month  5% in 3 months  7.5% in 6 months  10% in 1 year 1-2 % in 1 week  5% in 1 month  7.5% in 3 months  10% in 6 months  20% in 1 year > 2% in 1 week  > 5% in 1 month  > 7.5% in 3 months  > 10% in 6 months  > 20% in 1 year   Physical Findings     None *Mild subcutaneous fat and/or muscle loss  *Mild fluid accumulation  *Stage II decubitus  *Surgical wound or non-healing wound *Mod/severe subcutaneous fat and/or muscle loss  *Mod/severe fluid accumulation  *Stage III or IV decubitus  *Non-healing surgical wound     Provider, please specify diagnosis or diagnoses associated with above clinical findings.    Please specify the degree of above diagnosis of Malnutrition:      [ ] Mild Protein-Calorie Malnutrition  [ x] Moderate Protein-Calorie Malnutrition  [ ] Severe Protein-Calorie Malnutrition  [ ] Other Nutritional Diagnosis (please specify): ____________________________________  [ ] Other: ________________________________  [ ] Clinically Undetermined    Please document in your progress notes daily for the duration of treatment until resolved and include in your discharge summary.

## 2018-01-15 NOTE — PLAN OF CARE
"Problem: Patient Care Overview  Goal: Plan of Care Review  Outcome: Ongoing (interventions implemented as appropriate)  Pt VSS, pt. Pain well managed with current regimen.  Pt. Expresses concern/frustration with healing and disease process.  Pt. Implicated that he felt he would not get better and this might be the "end".  Will continue to monitor.       "

## 2018-01-15 NOTE — ASSESSMENT & PLAN NOTE
-Continue PO diet, no significant increase in fistula output. TPN per PICC, follow nutrition labs.  -ABx added this weekend for fever, WBC normal, afebrile now.  -WOCN following.  -Tentative plans for surgery Thursday.

## 2018-01-15 NOTE — SUBJECTIVE & OBJECTIVE
Subjective:     Interval History:     NAEON. Afebrile. Pain controlled. Ostomy bag intact without leaking. Ambulating. Voiding.    Post-Op Info:  * No surgery found *          Medications:  Continuous Infusions:   TPN ADULT CENTRAL LINE CUSTOM 100 mL/hr at 01/14/18 2236     Scheduled Meds:   ciprofloxacin  400 mg Intravenous Q12H    cyanocobalamin  1,000 mcg Intramuscular Q30 Days    enoxaparin  40 mg Subcutaneous Daily    fat emulsion 20%  250 mL Intravenous Daily    folic acid (FOLVITE) IVPB 1 mg  1 mg Intravenous Daily    metronidazole  500 mg Intravenous Q8H     PRN Meds:   acetaminophen    acetaminophen    barium    barium    diphenhydrAMINE    hydrocodone-acetaminophen 10-325mg    hydrocodone-acetaminophen 5-325mg    omnipaque    ondansetron    promethazine (PHENERGAN) IVPB    ramelteon    sodium chloride 0.9%        Objective:     Vital Signs (Most Recent):  Temp: 97.6 °F (36.4 °C) (01/15/18 0758)  Pulse: 77 (01/15/18 0758)  Resp: 16 (01/15/18 0411)  BP: (!) 96/58 (01/15/18 0758)  SpO2: 99 % (01/15/18 0758) Vital Signs (24h Range):  Temp:  [96.6 °F (35.9 °C)-101.3 °F (38.5 °C)] 97.6 °F (36.4 °C)  Pulse:  [] 77  Resp:  [16-18] 16  SpO2:  [97 %-100 %] 99 %  BP: ()/(51-59) 96/58     Intake/Output - Last 3 Shifts       01/13 0700 - 01/14 0659 01/14 0700 - 01/15 0659 01/15 0700 - 01/16 0659    P.O. 160 1560     IV Piggyback       .3 1225     Total Intake(mL/kg) 967.3 (15.8) 2785 (45.6)     Urine (mL/kg/hr) 1075 (0.7) 1600 (1.1)     Emesis/NG output       Other 230 (0.2) 200 (0.1)     Stool       Total Output 1305 1800      Net -337.7 +985             Urine Occurrence 2 x 12 x     Stool Occurrence 0 x      Emesis Occurrence 0 x            Physical Exam   Constitutional: He is oriented to person, place, and time. He appears well-developed and well-nourished. No distress.   HENT:   Head: Normocephalic and atraumatic.   Eyes: EOM are normal.   Neck: Normal range of motion.    Cardiovascular: Normal rate.    Pulmonary/Chest: Effort normal. No respiratory distress.   Abdominal: Soft. He exhibits no distension. There is no tenderness.   Ostomy bag on anterior abdominal wall, thin brown stool in bag mostly from RLQ fistula. R flank fistula with minimal drainage.   Neurological: He is alert and oriented to person, place, and time.   Skin: Skin is warm and dry.   Psychiatric: He has a normal mood and affect. His behavior is normal. Thought content normal.     Significant Labs:  BMP (Last 3 Results):   Recent Labs  Lab 01/10/18  1701 01/11/18  0357 01/13/18  0606 01/14/18  0541 01/15/18  0539   GLU 85 71 127* 130* 114*    137 135* 132* 134*   K 4.3 4.3 4.5 4.5 4.4    106 103 98 99   CO2 25 24 26 26 27   BUN 11 14 12 12 16   CREATININE 0.8 0.8 0.7 0.8 0.8   CALCIUM 8.4* 8.1* 8.2* 9.0 8.8   MG 1.9 2.0  --   --   --      CBC (Last 3 Results):   Recent Labs  Lab 01/13/18  0606 01/14/18  0541 01/15/18  0539   WBC 7.09 11.43 5.97   RBC 3.52* 3.92* 3.83*   HGB 8.4* 9.3* 9.2*   HCT 26.8* 29.4* 29.2*    265 255   MCV 76* 75* 76*   MCH 23.9* 23.7* 24.0*   MCHC 31.3* 31.6* 31.5*

## 2018-01-15 NOTE — PLAN OF CARE
01/15/18 1217   Discharge Reassessment   Assessment Type Discharge Planning Reassessment   Provided patient/caregiver education on the expected discharge date and the discharge plan Yes   Do you have any problems affording any of your prescribed medications? Yes   Discharge Plan A Home with family   Discharge Plan B Home with family

## 2018-01-16 LAB
ALBUMIN SERPL BCP-MCNC: 2.7 G/DL
ALP SERPL-CCNC: 115 U/L
ALT SERPL W/O P-5'-P-CCNC: 14 U/L
ANION GAP SERPL CALC-SCNC: 7 MMOL/L
AST SERPL-CCNC: 13 U/L
BASOPHILS # BLD AUTO: 0.03 K/UL
BASOPHILS NFR BLD: 0.4 %
BILIRUB SERPL-MCNC: 0.8 MG/DL
BUN SERPL-MCNC: 14 MG/DL
CALCIUM SERPL-MCNC: 8.9 MG/DL
CHLORIDE SERPL-SCNC: 98 MMOL/L
CO2 SERPL-SCNC: 28 MMOL/L
CREAT SERPL-MCNC: 0.9 MG/DL
DIFFERENTIAL METHOD: ABNORMAL
EOSINOPHIL # BLD AUTO: 0.1 K/UL
EOSINOPHIL NFR BLD: 1.5 %
ERYTHROCYTE [DISTWIDTH] IN BLOOD BY AUTOMATED COUNT: 16.7 %
EST. GFR  (AFRICAN AMERICAN): >60 ML/MIN/1.73 M^2
EST. GFR  (NON AFRICAN AMERICAN): >60 ML/MIN/1.73 M^2
GLUCOSE SERPL-MCNC: 90 MG/DL
HCT VFR BLD AUTO: 29.5 %
HGB BLD-MCNC: 9 G/DL
IMM GRANULOCYTES # BLD AUTO: 0.03 K/UL
IMM GRANULOCYTES NFR BLD AUTO: 0.4 %
LYMPHOCYTES # BLD AUTO: 1.8 K/UL
LYMPHOCYTES NFR BLD: 22.4 %
MCH RBC QN AUTO: 23.5 PG
MCHC RBC AUTO-ENTMCNC: 30.5 G/DL
MCV RBC AUTO: 77 FL
MONOCYTES # BLD AUTO: 1.2 K/UL
MONOCYTES NFR BLD: 14.2 %
NEUTROPHILS # BLD AUTO: 5 K/UL
NEUTROPHILS NFR BLD: 61.1 %
NRBC BLD-RTO: 0 /100 WBC
PHOSPHATE SERPL-MCNC: 3.3 MG/DL
PLATELET # BLD AUTO: 258 K/UL
PMV BLD AUTO: 10.3 FL
POTASSIUM SERPL-SCNC: 4.4 MMOL/L
PREALB SERPL-MCNC: 15 MG/DL
PROT SERPL-MCNC: 7.6 G/DL
RBC # BLD AUTO: 3.83 M/UL
SODIUM SERPL-SCNC: 133 MMOL/L
WBC # BLD AUTO: 8.23 K/UL

## 2018-01-16 PROCEDURE — S0030 INJECTION, METRONIDAZOLE: HCPCS | Performed by: COLON & RECTAL SURGERY

## 2018-01-16 PROCEDURE — 11000001 HC ACUTE MED/SURG PRIVATE ROOM

## 2018-01-16 PROCEDURE — 85025 COMPLETE CBC W/AUTO DIFF WBC: CPT

## 2018-01-16 PROCEDURE — 25000003 PHARM REV CODE 250: Performed by: COLON & RECTAL SURGERY

## 2018-01-16 PROCEDURE — 84134 ASSAY OF PREALBUMIN: CPT

## 2018-01-16 PROCEDURE — 80053 COMPREHEN METABOLIC PANEL: CPT

## 2018-01-16 PROCEDURE — A4217 STERILE WATER/SALINE, 500 ML: HCPCS | Performed by: NURSE PRACTITIONER

## 2018-01-16 PROCEDURE — 25000003 PHARM REV CODE 250: Performed by: STUDENT IN AN ORGANIZED HEALTH CARE EDUCATION/TRAINING PROGRAM

## 2018-01-16 PROCEDURE — 36415 COLL VENOUS BLD VENIPUNCTURE: CPT

## 2018-01-16 PROCEDURE — 84100 ASSAY OF PHOSPHORUS: CPT

## 2018-01-16 PROCEDURE — 25000003 PHARM REV CODE 250: Performed by: NURSE PRACTITIONER

## 2018-01-16 PROCEDURE — 99233 SBSQ HOSP IP/OBS HIGH 50: CPT | Mod: 57,,, | Performed by: COLON & RECTAL SURGERY

## 2018-01-16 PROCEDURE — 63600175 PHARM REV CODE 636 W HCPCS: Performed by: NURSE PRACTITIONER

## 2018-01-16 PROCEDURE — 99900035 HC TECH TIME PER 15 MIN (STAT)

## 2018-01-16 PROCEDURE — 63600175 PHARM REV CODE 636 W HCPCS: Performed by: COLON & RECTAL SURGERY

## 2018-01-16 RX ORDER — NEOMYCIN SULFATE 500 MG/1
1000 TABLET ORAL ONCE
Status: COMPLETED | OUTPATIENT
Start: 2018-01-17 | End: 2018-01-17

## 2018-01-16 RX ORDER — METRONIDAZOLE 500 MG/1
500 TABLET ORAL ONCE
Status: COMPLETED | OUTPATIENT
Start: 2018-01-17 | End: 2018-01-17

## 2018-01-16 RX ORDER — POLYETHYLENE GLYCOL 3350 17 G/17G
17 POWDER, FOR SOLUTION ORAL
Status: DISPENSED | OUTPATIENT
Start: 2018-01-17 | End: 2018-01-17

## 2018-01-16 RX ORDER — BISACODYL 5 MG
20 TABLET, DELAYED RELEASE (ENTERIC COATED) ORAL ONCE
Status: COMPLETED | OUTPATIENT
Start: 2018-01-17 | End: 2018-01-17

## 2018-01-16 RX ORDER — METRONIDAZOLE 500 MG/1
500 TABLET ORAL EVERY 8 HOURS
Status: DISCONTINUED | OUTPATIENT
Start: 2018-01-17 | End: 2018-01-16

## 2018-01-16 RX ADMIN — HYDROCODONE BITARTRATE AND ACETAMINOPHEN 1 TABLET: 10; 325 TABLET ORAL at 03:01

## 2018-01-16 RX ADMIN — METRONIDAZOLE 500 MG: 500 INJECTION, SOLUTION INTRAVENOUS at 09:01

## 2018-01-16 RX ADMIN — METRONIDAZOLE 500 MG: 500 INJECTION, SOLUTION INTRAVENOUS at 05:01

## 2018-01-16 RX ADMIN — HYDROCODONE BITARTRATE AND ACETAMINOPHEN 1 TABLET: 10; 325 TABLET ORAL at 09:01

## 2018-01-16 RX ADMIN — HYDROCODONE BITARTRATE AND ACETAMINOPHEN 1 TABLET: 10; 325 TABLET ORAL at 01:01

## 2018-01-16 RX ADMIN — HYDROCODONE BITARTRATE AND ACETAMINOPHEN 1 TABLET: 10; 325 TABLET ORAL at 10:01

## 2018-01-16 RX ADMIN — ENOXAPARIN SODIUM 40 MG: 100 INJECTION SUBCUTANEOUS at 05:01

## 2018-01-16 RX ADMIN — CIPROFLOXACIN 400 MG: 2 INJECTION, SOLUTION INTRAVENOUS at 02:01

## 2018-01-16 RX ADMIN — CALCIUM GLUCONATE: 94 INJECTION, SOLUTION INTRAVENOUS at 11:01

## 2018-01-16 RX ADMIN — FOLIC ACID 1 MG: 5 INJECTION, SOLUTION INTRAMUSCULAR; INTRAVENOUS; SUBCUTANEOUS at 10:01

## 2018-01-16 RX ADMIN — HYDROCODONE BITARTRATE AND ACETAMINOPHEN 1 TABLET: 10; 325 TABLET ORAL at 05:01

## 2018-01-16 RX ADMIN — METRONIDAZOLE 500 MG: 500 INJECTION, SOLUTION INTRAVENOUS at 01:01

## 2018-01-16 NOTE — PROGRESS NOTES
Spoke with Dr. Foster regarding PICC placement. Orders to leave PICC where it is and secure it. Do not use it. Hold TPN .

## 2018-01-16 NOTE — CONSULTS
Paged surgery team and spoke with patient's RN regarding need to wait to place picc until patient is fever free for 24 hours.  RN to speak with team.  Will await return call.

## 2018-01-16 NOTE — SUBJECTIVE & OBJECTIVE
Subjective:     Interval History:   PICC line inadvertently came out yesterday. Low grade temp overnight (100.8).     Post-Op Info:  * No surgery found *          Medications:  Continuous Infusions:   Amino acid 4.25% - dextrose 5% (CLINIMIX-E) solution with additives (1L provides 42.5 gm AA, 50 gm CHO (170 kcal/L dextrose), Na 35, K 30, Mg 5, Ca 4.5, Acetate 70, Cl 39, Phos 15)       Scheduled Meds:   ciprofloxacin  400 mg Intravenous Q12H    cyanocobalamin  1,000 mcg Intramuscular Q30 Days    enoxaparin  40 mg Subcutaneous Daily    folic acid (FOLVITE) IVPB 1 mg  1 mg Intravenous Daily    metronidazole  500 mg Intravenous Q8H     PRN Meds:   acetaminophen    acetaminophen    barium    barium    diphenhydrAMINE    hydrocodone-acetaminophen 10-325mg    hydrocodone-acetaminophen 5-325mg    omnipaque    ondansetron    promethazine (PHENERGAN) IVPB    ramelteon    sodium chloride 0.9%        Objective:     Vital Signs (Most Recent):  Temp: 97.3 °F (36.3 °C) (01/16/18 0756)  Pulse: 91 (01/16/18 0756)  Resp: 15 (01/16/18 0756)  BP: (!) 94/53 (01/16/18 0756)  SpO2: 99 % (01/16/18 0756) Vital Signs (24h Range):  Temp:  [96.8 °F (36 °C)-100.8 °F (38.2 °C)] 97.3 °F (36.3 °C)  Pulse:  [88-96] 91  Resp:  [15-20] 15  SpO2:  [97 %-100 %] 99 %  BP: ()/(51-58) 94/53     Intake/Output - Last 3 Shifts       01/14 0700 - 01/15 0659 01/15 0700 - 01/16 0659 01/16 0700 - 01/17 0659    P.O. 1560 720     I.V. (mL/kg)  850 (13.9)     TPN 1225      Total Intake(mL/kg) 2785 (45.6) 1570 (25.7)     Urine (mL/kg/hr) 1600 (1.1) 0 (0)     Emesis/NG output  0 (0)     Other 200 (0.1) 100 (0.1)     Stool  0 (0)     Total Output 1800 100      Net +985 +1470             Urine Occurrence 12 x 3 x     Stool Occurrence  0 x     Emesis Occurrence  0 x           Physical Exam   Constitutional: He is oriented to person, place, and time. He appears well-developed and well-nourished. No distress.   HENT:   Head: Normocephalic and  atraumatic.   Eyes: EOM are normal.   Neck: Normal range of motion.   Cardiovascular: Normal rate.    Pulmonary/Chest: Effort normal. No respiratory distress.   Abdominal: Soft. He exhibits no distension. There is no tenderness.   Ostomy bag on anterior abdominal wall, thin brown stool in bag mostly from RLQ fistula. R flank fistula with minimal drainage.   Neurological: He is alert and oriented to person, place, and time.   Skin: Skin is warm and dry.   Psychiatric: He has a normal mood and affect. His behavior is normal. Thought content normal.     Significant Labs:  BMP (Last 3 Results):   Recent Labs  Lab 01/10/18  1701 01/11/18  0357  01/14/18  0541 01/15/18  0539 01/16/18  0445   GLU 85 71  < > 130* 114* 90    137  < > 132* 134* 133*   K 4.3 4.3  < > 4.5 4.4 4.4    106  < > 98 99 98   CO2 25 24  < > 26 27 28   BUN 11 14  < > 12 16 14   CREATININE 0.8 0.8  < > 0.8 0.8 0.9   CALCIUM 8.4* 8.1*  < > 9.0 8.8 8.9   MG 1.9 2.0  --   --   --   --    < > = values in this interval not displayed.  CBC (Last 3 Results):     Recent Labs  Lab 01/14/18  0541 01/15/18  0539 01/16/18  0445   WBC 11.43 5.97 8.23   RBC 3.92* 3.83* 3.83*   HGB 9.3* 9.2* 9.0*   HCT 29.4* 29.2* 29.5*    255 258   MCV 75* 76* 77*   MCH 23.7* 24.0* 23.5*   MCHC 31.6* 31.5* 30.5*

## 2018-01-16 NOTE — PLAN OF CARE
Problem: Patient Care Overview  Goal: Plan of Care Review  Outcome: Ongoing (interventions implemented as appropriate)  Patient AAOX3, VSS. Two side rails up, bed locked, call light within reach. No falls noted as these precautions remain. Patient is free of skin breakdown as patient moves well independently. Pain controlled well with PRN meds. Hourly rounds made and no complaints at this time noted. Will resume with plan of care.

## 2018-01-16 NOTE — ASSESSMENT & PLAN NOTE
-Continue PO diet, no significant increase in fistula output.  - Replace PICC once afebrile for 24 hours, PIV and PPN for now.  -Continue antibiotics   -WOCN following.  -Tentative plans for surgery Thursday,

## 2018-01-16 NOTE — PROGRESS NOTES
[facility]  Colorectal Surgery  Progress Note    Patient Name: Pedro Nelson  MRN: [id number]  Admission Date: 1/10/2018  Hospital Length of Stay: 6 days  Attending Physician: OKSANA Upton MD    Subjective:     Interval History:   PICC line inadvertently came out yesterday. Low grade temp overnight (100.8).     Post-Op Info:  * No surgery found *          Medications:  Continuous Infusions:   Amino acid 4.25% - dextrose 5% (CLINIMIX-E) solution with additives (1L provides 42.5 gm AA, 50 gm CHO (170 kcal/L dextrose), Na 35, K 30, Mg 5, Ca 4.5, Acetate 70, Cl 39, Phos 15)       Scheduled Meds:   ciprofloxacin  400 mg Intravenous Q12H    cyanocobalamin  1,000 mcg Intramuscular Q30 Days    enoxaparin  40 mg Subcutaneous Daily    folic acid (FOLVITE) IVPB 1 mg  1 mg Intravenous Daily    metronidazole  500 mg Intravenous Q8H     PRN Meds:   acetaminophen    acetaminophen    barium    barium    diphenhydrAMINE    hydrocodone-acetaminophen 10-325mg    hydrocodone-acetaminophen 5-325mg    omnipaque    ondansetron    promethazine (PHENERGAN) IVPB    ramelteon    sodium chloride 0.9%        Objective:     Vital Signs (Most Recent):  Temp: 97.3 °F (36.3 °C) (01/16/18 0756)  Pulse: 91 (01/16/18 0756)  Resp: 15 (01/16/18 0756)  BP: (!) 94/53 (01/16/18 0756)  SpO2: 99 % (01/16/18 0756) Vital Signs (24h Range):  Temp:  [96.8 °F (36 °C)-100.8 °F (38.2 °C)] 97.3 °F (36.3 °C)  Pulse:  [88-96] 91  Resp:  [15-20] 15  SpO2:  [97 %-100 %] 99 %  BP: ()/(51-58) 94/53     Intake/Output - Last 3 Shifts       01/14 0700 - 01/15 0659 01/15 0700 - 01/16 0659 01/16 0700 - 01/17 0659    P.O. 1560 720     I.V. (mL/kg)  850 (13.9)     TPN 1225      Total Intake(mL/kg) 2785 (45.6) 1570 (25.7)     Urine (mL/kg/hr) 1600 (1.1) 0 (0)     Emesis/NG output  0 (0)     Other 200 (0.1) 100 (0.1)     Stool  0 (0)     Total Output 1800 100      Net +985 +1470             Urine Occurrence 12 x 3 x     Stool  Occurrence  0 x     Emesis Occurrence  0 x           Physical Exam   Constitutional: He is oriented to person, place, and time. He appears well-developed and well-nourished. No distress.   HENT:   Head: Normocephalic and atraumatic.   Eyes: EOM are normal.   Neck: Normal range of motion.   Cardiovascular: Normal rate.    Pulmonary/Chest: Effort normal. No respiratory distress.   Abdominal: Soft. He exhibits no distension. There is no tenderness.   Ostomy bag on anterior abdominal wall, thin brown stool in bag mostly from RLQ fistula. R flank fistula with minimal drainage.   Neurological: He is alert and oriented to person, place, and time.   Skin: Skin is warm and dry.   Psychiatric: He has a normal mood and affect. His behavior is normal. Thought content normal.     Significant Labs:  BMP (Last 3 Results):   Recent Labs  Lab 01/10/18  1701 01/11/18  0357  01/14/18  0541 01/15/18  0539 01/16/18  0445   GLU 85 71  < > 130* 114* 90    137  < > 132* 134* 133*   K 4.3 4.3  < > 4.5 4.4 4.4    106  < > 98 99 98   CO2 25 24  < > 26 27 28   BUN 11 14  < > 12 16 14   CREATININE 0.8 0.8  < > 0.8 0.8 0.9   CALCIUM 8.4* 8.1*  < > 9.0 8.8 8.9   MG 1.9 2.0  --   --   --   --    < > = values in this interval not displayed.  CBC (Last 3 Results):     Recent Labs  Lab 01/14/18  0541 01/15/18  0539 01/16/18  0445   WBC 11.43 5.97 8.23   RBC 3.92* 3.83* 3.83*   HGB 9.3* 9.2* 9.0*   HCT 29.4* 29.2* 29.5*    255 258   MCV 75* 76* 77*   MCH 23.7* 24.0* 23.5*   MCHC 31.6* 31.5* 30.5*     Assessment/Plan:     * Enterocutaneous fistula    -Continue PO diet, no significant increase in fistula output.  - Replace PICC once afebrile for 24 hours, PIV and PPN for now.  -Continue antibiotics   -WOCN following.  -Tentative plans for surgery Thursday,         Malnutrition    Cont TPN/diet              Michelle Ng NP  Colorectal Surgery  [facility]      I have personally obtained a history and performed  a physical exam with and independent to the above nurse practitioner and I discussed the findings and plan with the patient.    I agree with the above findings and plan     Discussed at length with pt.  Given intermittent fever, patient will need resection sooner.  No signs of peritonitis or uncontrolled sepsis.    Will need resection, repair of enterocutaneous fistula.  Ureteral stents discussed.  Likely    H, Benjamin Upton MD, FACS, FASCRS  Staff Surgeon  Dept of Colon and Rectal Surgery  [facility] [place], [place]

## 2018-01-17 ENCOUNTER — ANESTHESIA EVENT (OUTPATIENT)
Dept: SURGERY | Facility: HOSPITAL | Age: 43
DRG: 908 | End: 2018-01-17
Payer: COMMERCIAL

## 2018-01-17 LAB
ABO + RH BLD: NORMAL
ALBUMIN SERPL BCP-MCNC: 2.7 G/DL
ALP SERPL-CCNC: 114 U/L
ALT SERPL W/O P-5'-P-CCNC: 14 U/L
ANION GAP SERPL CALC-SCNC: 6 MMOL/L
AST SERPL-CCNC: 14 U/L
BASOPHILS # BLD AUTO: 0.02 K/UL
BASOPHILS NFR BLD: 0.3 %
BILIRUB SERPL-MCNC: 0.6 MG/DL
BLD GP AB SCN CELLS X3 SERPL QL: NORMAL
BUN SERPL-MCNC: 14 MG/DL
CALCIUM SERPL-MCNC: 8.8 MG/DL
CHLORIDE SERPL-SCNC: 96 MMOL/L
CO2 SERPL-SCNC: 30 MMOL/L
CREAT SERPL-MCNC: 0.8 MG/DL
DIFFERENTIAL METHOD: ABNORMAL
EOSINOPHIL # BLD AUTO: 0.1 K/UL
EOSINOPHIL NFR BLD: 1.5 %
ERYTHROCYTE [DISTWIDTH] IN BLOOD BY AUTOMATED COUNT: 16.7 %
EST. GFR  (AFRICAN AMERICAN): >60 ML/MIN/1.73 M^2
EST. GFR  (NON AFRICAN AMERICAN): >60 ML/MIN/1.73 M^2
GLUCOSE SERPL-MCNC: 106 MG/DL
HCT VFR BLD AUTO: 27.9 %
HGB BLD-MCNC: 8.5 G/DL
IMM GRANULOCYTES # BLD AUTO: 0.03 K/UL
IMM GRANULOCYTES NFR BLD AUTO: 0.4 %
LYMPHOCYTES # BLD AUTO: 1.6 K/UL
LYMPHOCYTES NFR BLD: 22.6 %
MCH RBC QN AUTO: 22.8 PG
MCHC RBC AUTO-ENTMCNC: 30.5 G/DL
MCV RBC AUTO: 75 FL
MONOCYTES # BLD AUTO: 1 K/UL
MONOCYTES NFR BLD: 13.3 %
NEUTROPHILS # BLD AUTO: 4.4 K/UL
NEUTROPHILS NFR BLD: 61.9 %
NRBC BLD-RTO: 0 /100 WBC
PHOSPHATE SERPL-MCNC: 3.1 MG/DL
PLATELET # BLD AUTO: 249 K/UL
PMV BLD AUTO: 10.1 FL
POCT GLUCOSE: 117 MG/DL (ref 70–110)
POTASSIUM SERPL-SCNC: 4.3 MMOL/L
PROT SERPL-MCNC: 7.8 G/DL
RBC # BLD AUTO: 3.72 M/UL
SODIUM SERPL-SCNC: 132 MMOL/L
WBC # BLD AUTO: 7.12 K/UL

## 2018-01-17 PROCEDURE — 02HV33Z INSERTION OF INFUSION DEVICE INTO SUPERIOR VENA CAVA, PERCUTANEOUS APPROACH: ICD-10-PCS | Performed by: COLON & RECTAL SURGERY

## 2018-01-17 PROCEDURE — 25000003 PHARM REV CODE 250: Performed by: COLON & RECTAL SURGERY

## 2018-01-17 PROCEDURE — S0030 INJECTION, METRONIDAZOLE: HCPCS | Performed by: COLON & RECTAL SURGERY

## 2018-01-17 PROCEDURE — 36415 COLL VENOUS BLD VENIPUNCTURE: CPT

## 2018-01-17 PROCEDURE — 84100 ASSAY OF PHOSPHORUS: CPT

## 2018-01-17 PROCEDURE — 86850 RBC ANTIBODY SCREEN: CPT

## 2018-01-17 PROCEDURE — 86920 COMPATIBILITY TEST SPIN: CPT

## 2018-01-17 PROCEDURE — 63600175 PHARM REV CODE 636 W HCPCS: Performed by: COLON & RECTAL SURGERY

## 2018-01-17 PROCEDURE — A4217 STERILE WATER/SALINE, 500 ML: HCPCS | Performed by: NURSE PRACTITIONER

## 2018-01-17 PROCEDURE — 63600175 PHARM REV CODE 636 W HCPCS: Performed by: STUDENT IN AN ORGANIZED HEALTH CARE EDUCATION/TRAINING PROGRAM

## 2018-01-17 PROCEDURE — 99223 1ST HOSP IP/OBS HIGH 75: CPT | Mod: ,,, | Performed by: COLON & RECTAL SURGERY

## 2018-01-17 PROCEDURE — C1751 CATH, INF, PER/CENT/MIDLINE: HCPCS

## 2018-01-17 PROCEDURE — 63600175 PHARM REV CODE 636 W HCPCS: Performed by: NURSE PRACTITIONER

## 2018-01-17 PROCEDURE — A4216 STERILE WATER/SALINE, 10 ML: HCPCS | Performed by: COLON & RECTAL SURGERY

## 2018-01-17 PROCEDURE — 36569 INSJ PICC 5 YR+ W/O IMAGING: CPT

## 2018-01-17 PROCEDURE — 11000001 HC ACUTE MED/SURG PRIVATE ROOM

## 2018-01-17 PROCEDURE — 76937 US GUIDE VASCULAR ACCESS: CPT

## 2018-01-17 PROCEDURE — 80053 COMPREHEN METABOLIC PANEL: CPT

## 2018-01-17 PROCEDURE — 25000003 PHARM REV CODE 250: Performed by: NURSE PRACTITIONER

## 2018-01-17 PROCEDURE — 25000003 PHARM REV CODE 250: Performed by: STUDENT IN AN ORGANIZED HEALTH CARE EDUCATION/TRAINING PROGRAM

## 2018-01-17 PROCEDURE — 85025 COMPLETE CBC W/AUTO DIFF WBC: CPT

## 2018-01-17 RX ORDER — SODIUM CHLORIDE 0.9 % (FLUSH) 0.9 %
10 SYRINGE (ML) INJECTION EVERY 6 HOURS
Status: DISCONTINUED | OUTPATIENT
Start: 2018-01-17 | End: 2018-01-28 | Stop reason: HOSPADM

## 2018-01-17 RX ORDER — SODIUM CHLORIDE 0.9 % (FLUSH) 0.9 %
10 SYRINGE (ML) INJECTION
Status: DISCONTINUED | OUTPATIENT
Start: 2018-01-17 | End: 2018-01-28 | Stop reason: HOSPADM

## 2018-01-17 RX ADMIN — NEOMYCIN SULFATE 1000 MG: 500 TABLET ORAL at 02:01

## 2018-01-17 RX ADMIN — POLYETHYLENE GLYCOL 3350 17 G: 17 POWDER, FOR SOLUTION ORAL at 02:01

## 2018-01-17 RX ADMIN — POLYETHYLENE GLYCOL 3350 17 G: 17 POWDER, FOR SOLUTION ORAL at 05:01

## 2018-01-17 RX ADMIN — METRONIDAZOLE 500 MG: 500 TABLET ORAL at 09:01

## 2018-01-17 RX ADMIN — METRONIDAZOLE 500 MG: 500 INJECTION, SOLUTION INTRAVENOUS at 09:01

## 2018-01-17 RX ADMIN — HYDROCODONE BITARTRATE AND ACETAMINOPHEN 1 TABLET: 10; 325 TABLET ORAL at 05:01

## 2018-01-17 RX ADMIN — NEOMYCIN SULFATE 1000 MG: 500 TABLET ORAL at 12:01

## 2018-01-17 RX ADMIN — POLYETHYLENE GLYCOL 3350 17 G: 17 POWDER, FOR SOLUTION ORAL at 04:01

## 2018-01-17 RX ADMIN — METRONIDAZOLE 500 MG: 500 TABLET ORAL at 02:01

## 2018-01-17 RX ADMIN — METRONIDAZOLE 500 MG: 500 TABLET ORAL at 12:01

## 2018-01-17 RX ADMIN — NEOMYCIN SULFATE 1000 MG: 500 TABLET ORAL at 09:01

## 2018-01-17 RX ADMIN — HYDROCODONE BITARTRATE AND ACETAMINOPHEN 1 TABLET: 10; 325 TABLET ORAL at 11:01

## 2018-01-17 RX ADMIN — BISACODYL 20 MG: 5 TABLET, COATED ORAL at 08:01

## 2018-01-17 RX ADMIN — HYDROCODONE BITARTRATE AND ACETAMINOPHEN 1 TABLET: 10; 325 TABLET ORAL at 12:01

## 2018-01-17 RX ADMIN — POLYETHYLENE GLYCOL 3350 17 G: 17 POWDER, FOR SOLUTION ORAL at 10:01

## 2018-01-17 RX ADMIN — CIPROFLOXACIN 400 MG: 2 INJECTION, SOLUTION INTRAVENOUS at 02:01

## 2018-01-17 RX ADMIN — METRONIDAZOLE 500 MG: 500 INJECTION, SOLUTION INTRAVENOUS at 05:01

## 2018-01-17 RX ADMIN — ONDANSETRON 4 MG: 2 INJECTION INTRAMUSCULAR; INTRAVENOUS at 02:01

## 2018-01-17 RX ADMIN — POLYETHYLENE GLYCOL 3350 17 G: 17 POWDER, FOR SOLUTION ORAL at 08:01

## 2018-01-17 RX ADMIN — ONDANSETRON 4 MG: 2 INJECTION INTRAMUSCULAR; INTRAVENOUS at 08:01

## 2018-01-17 RX ADMIN — FOLIC ACID 1 MG: 5 INJECTION, SOLUTION INTRAMUSCULAR; INTRAVENOUS; SUBCUTANEOUS at 10:01

## 2018-01-17 RX ADMIN — Medication 10 ML: at 05:01

## 2018-01-17 RX ADMIN — POLYETHYLENE GLYCOL 3350 17 G: 17 POWDER, FOR SOLUTION ORAL at 06:01

## 2018-01-17 RX ADMIN — CALCIUM GLUCONATE: 94 INJECTION, SOLUTION INTRAVENOUS at 11:01

## 2018-01-17 RX ADMIN — METRONIDAZOLE 500 MG: 500 INJECTION, SOLUTION INTRAVENOUS at 12:01

## 2018-01-17 RX ADMIN — CIPROFLOXACIN 400 MG: 2 INJECTION, SOLUTION INTRAVENOUS at 03:01

## 2018-01-17 NOTE — PROCEDURES
"Pedro Nelson is a 42 y.o. male patient.    Temp: 97.3 °F (36.3 °C) (01/17/18 1223)  Pulse: 104 (01/17/18 1223)  Resp: 16 (01/17/18 1223)  BP: 111/62 (01/17/18 1223)  SpO2: 99 % (01/17/18 1223)  Weight: 61.1 kg (134 lb 11.2 oz) (01/10/18 1442)  Height: 6' 3" (190.5 cm) (01/10/18 1442)    PICC  Date/Time: 1/17/2018 3:29 PM  Performed by: SHWETHA BEACH  Consent Done: Yes  Time out: Immediately prior to procedure a time out was called to verify the correct patient, procedure, equipment, support staff and site/side marked as required  Indications: med administration and vascular access  Anesthesia: local infiltration  Local anesthetic: lidocaine 1% without epinephrine  Anesthetic Total (mL): 2  Preparation: skin prepped with ChloraPrep  Skin prep agent dried: skin prep agent completely dried prior to procedure  Sterile barriers: all five maximum sterile barriers used - cap, mask, sterile gown, sterile gloves, and large sterile sheet  Hand hygiene: hand hygiene performed prior to central venous catheter insertion  Location details: right brachial  Catheter type: double lumen  Catheter size: 5 Fr  Catheter Length: 34cm    Ultrasound guidance: yes  Vessel Caliber: medium and patent, compressibility normal  Vascular Doppler: not done  Needle advanced into vessel with real time Ultrasound guidance.  Guidewire confirmed in vessel.  Image recorded and saved.  Sterile sheath used.  Number of attempts: 1  Post-procedure: blood return through all ports, chlorhexidine patch and sterile dressing applied  Technical procedures used: 3cg  Specimens: No  Implants: No  Assessment: placement verified by x-ray  Complications: none        Keyana Doshi  1/17/2018    "

## 2018-01-17 NOTE — SUBJECTIVE & OBJECTIVE
Subjective:     Interval History:   No acte events overnight. Plan for OR tomorrow, prepping today.      Post-Op Info:  * No surgery found *          Medications:  Continuous Infusions:   TPN ADULT CENTRAL LINE CUSTOM 75 mL/hr at 01/16/18 2302     Scheduled Meds:   ciprofloxacin  400 mg Intravenous Q12H    cyanocobalamin  1,000 mcg Intramuscular Q30 Days    enoxaparin  40 mg Subcutaneous Daily    folic acid (FOLVITE) IVPB 1 mg  1 mg Intravenous Daily    metronidazole  500 mg Intravenous Q8H    metroNIDAZOLE  500 mg Oral Once    metroNIDAZOLE  500 mg Oral Once    metroNIDAZOLE  500 mg Oral Once    neomycin  1,000 mg Oral Once    neomycin  1,000 mg Oral Once    neomycin  1,000 mg Oral Once    polyethylene glycol  17 g Oral Q1H     PRN Meds:   acetaminophen    acetaminophen    barium    barium    diphenhydrAMINE    hydrocodone-acetaminophen 10-325mg    hydrocodone-acetaminophen 5-325mg    omnipaque    ondansetron    promethazine (PHENERGAN) IVPB    ramelteon    sodium chloride 0.9%        Objective:     Vital Signs (Most Recent):  Temp: 98.5 °F (36.9 °C) (01/17/18 0752)  Pulse: 71 (01/17/18 0752)  Resp: 18 (01/17/18 0752)  BP: (!) 110/56 (01/17/18 0752)  SpO2: 99 % (01/17/18 0752) Vital Signs (24h Range):  Temp:  [96.9 °F (36.1 °C)-98.6 °F (37 °C)] 98.5 °F (36.9 °C)  Pulse:  [71-92] 71  Resp:  [16-18] 18  SpO2:  [96 %-100 %] 99 %  BP: (100-113)/(55-68) 110/56     Intake/Output - Last 3 Shifts       01/15 0700 - 01/16 0659 01/16 0700 - 01/17 0659 01/17 0700 - 01/18 0659    P.O. 720      I.V. (mL/kg) 850 (13.9)      TPN       Total Intake(mL/kg) 1570 (25.7)      Urine (mL/kg/hr) 0 (0)      Emesis/NG output 0 (0)      Other 100 (0.1)      Stool 0 (0)      Total Output 100        Net +1470               Urine Occurrence 3 x      Stool Occurrence 0 x      Emesis Occurrence 0 x            Physical Exam   Constitutional: He is oriented to person, place, and time. He appears well-developed and  well-nourished. No distress.   HENT:   Head: Normocephalic and atraumatic.   Eyes: EOM are normal.   Neck: Normal range of motion.   Cardiovascular: Normal rate.    Pulmonary/Chest: Effort normal. No respiratory distress.   Abdominal: Soft. He exhibits no distension. There is no tenderness.   Ostomy bag on anterior abdominal wall, thin brown stool in bag mostly from RLQ fistula. R flank fistula with minimal drainage.   Neurological: He is alert and oriented to person, place, and time.   Skin: Skin is warm and dry.   Psychiatric: He has a normal mood and affect. His behavior is normal. Thought content normal.     Significant Labs:  BMP (Last 3 Results):   Recent Labs  Lab 01/10/18  1701 01/11/18  0357  01/15/18  0539 01/16/18  0445 01/17/18  0537   GLU 85 71  < > 114* 90 106    137  < > 134* 133* 132*   K 4.3 4.3  < > 4.4 4.4 4.3    106  < > 99 98 96   CO2 25 24  < > 27 28 30*   BUN 11 14  < > 16 14 14   CREATININE 0.8 0.8  < > 0.8 0.9 0.8   CALCIUM 8.4* 8.1*  < > 8.8 8.9 8.8   MG 1.9 2.0  --   --   --   --    < > = values in this interval not displayed.  CBC (Last 3 Results):     Recent Labs  Lab 01/15/18  0539 01/16/18  0445 01/17/18  0537   WBC 5.97 8.23 7.12   RBC 3.83* 3.83* 3.72*   HGB 9.2* 9.0* 8.5*   HCT 29.2* 29.5* 27.9*    258 249   MCV 76* 77* 75*   MCH 24.0* 23.5* 22.8*   MCHC 31.5* 30.5* 30.5*

## 2018-01-17 NOTE — SUBJECTIVE & OBJECTIVE
PTA Medications   Medication Sig    HUMIRA PEN PnKt injection     tramadol (ULTRAM) 50 mg tablet Take 50 mg by mouth every 6 (six) hours as needed for Pain.       Review of patient's allergies indicates:  No Known Allergies    Past Medical History:   Diagnosis Date    Crohn's disease      Past Surgical History:   Procedure Laterality Date    COLON SURGERY  2000     Family History     None        Social History Main Topics    Smoking status: Current Every Day Smoker     Packs/day: 0.25     Years: 10.00     Types: Cigarettes    Smokeless tobacco: Not on file    Alcohol use No    Drug use: No    Sexual activity: Not Currently     Review of Systems   Constitutional: Positive for activity change.   HENT: Negative.    Eyes: Negative.    Respiratory: Negative.    Cardiovascular: Negative.    Gastrointestinal: Positive for abdominal pain.   Endocrine: Negative.    Genitourinary: Negative.    Musculoskeletal: Negative.    Skin: Negative.    Allergic/Immunologic: Negative.    Neurological: Negative.    Psychiatric/Behavioral: Negative.      Objective:     Vital Signs (Most Recent):  Temp: 97.3 °F (36.3 °C) (01/17/18 1223)  Pulse: 104 (01/17/18 1223)  Resp: 16 (01/17/18 1223)  BP: 111/62 (01/17/18 1223)  SpO2: 99 % (01/17/18 1223) Vital Signs (24h Range):  Temp:  [96.9 °F (36.1 °C)-98.6 °F (37 °C)] 97.3 °F (36.3 °C)  Pulse:  [] 104  Resp:  [16-18] 16  SpO2:  [96 %-100 %] 99 %  BP: (100-113)/(55-62) 111/62     Weight: 61.1 kg (134 lb 11.2 oz)  Body mass index is 16.84 kg/m².    Physical Exam   Constitutional: He is oriented to person, place, and time. He appears well-developed. No distress.   HENT:   Head: Normocephalic and atraumatic.   Eyes: EOM are normal.   Neck: Normal range of motion.   Cardiovascular: Normal rate and intact distal pulses.    Pulmonary/Chest: Effort normal.   Abdominal: Soft. He exhibits no distension. There is tenderness. There is no rebound and no guarding.   Enterocutaneous fistulas to  the lower midline and RLQ, brown liquid stool from ECF. Additional fistula on the R flank.   Musculoskeletal: Normal range of motion.   Neurological: He is alert and oriented to person, place, and time.   Skin: Skin is warm and dry.   Psychiatric: He has a normal mood and affect.     Significant Labs:  BMP (Last 3 Results):   Recent Labs  Lab 01/10/18  1701 01/11/18  0357  01/15/18  0539 01/16/18  0445 01/17/18  0537   GLU 85 71  < > 114* 90 106    137  < > 134* 133* 132*   K 4.3 4.3  < > 4.4 4.4 4.3    106  < > 99 98 96   CO2 25 24  < > 27 28 30*   BUN 11 14  < > 16 14 14   CREATININE 0.8 0.8  < > 0.8 0.9 0.8   CALCIUM 8.4* 8.1*  < > 8.8 8.9 8.8   MG 1.9 2.0  --   --   --   --    < > = values in this interval not displayed.  CBC (Last 3 Results):   Recent Labs  Lab 01/15/18  0539 01/16/18  0445 01/17/18  0537   WBC 5.97 8.23 7.12   RBC 3.83* 3.83* 3.72*   HGB 9.2* 9.0* 8.5*   HCT 29.2* 29.5* 27.9*    258 249   MCV 76* 77* 75*   MCH 24.0* 23.5* 22.8*   MCHC 31.5* 30.5* 30.5*     CMP (Last 3 Results):   Recent Labs  Lab 01/15/18  0539 01/16/18  0445 01/17/18  0537   * 90 106   CALCIUM 8.8 8.9 8.8   ALBUMIN 2.7* 2.7* 2.7*   PROT 7.8 7.6 7.8   * 133* 132*   K 4.4 4.4 4.3   CO2 27 28 30*   CL 99 98 96   BUN 16 14 14   CREATININE 0.8 0.9 0.8   ALKPHOS 116 115 114   ALT 17 14 14   AST 24 13 14   BILITOT 0.6 0.8 0.6       Significant Diagnostics:    1/10/18  Fistulous tract arising from the anterior margin of the ileocolonic anastomosis with associated contrast material extending into the iliacus and lateral abdominal wall musculature with cutaneous fistulous tracts in the right lower quadrant of the abdomen and right posterolateral subcutaneous tissues adjacent to the iliac bone.    Splenomegaly.

## 2018-01-17 NOTE — H&P
[facility]  Colorectal Surgery  History & Physical    Patient Name: Pedro Nelson  MRN: [id number]  Admission Date: 1/10/2018  Attending Physician: OKSANA Upton MD   Primary Care Provider: Primary Doctor No    Subjective:     Chief Complaint/Reason for Admission: ECF    History of Present Illness:  HPI  41 yo male with Crohn's ileitis, fistulizing.  Underwent resection 5/ 2017. Did well initially after surgery.  D/c from hospital tolerating diet, moving bowels.  Returned to [place].      Two weeks after d/c pt began leaking from midline wound.  Managed at local hospital.    TPN antibiotics.   Never operated on there.    Now leaking from all prior EC fistula sites - midline wound, right lower quadrant, and right flank.    Currently no nausea or vomiting.  Tolerating some PO.  BMs qod.  No blood.  TPN last given two days ago.  No fever.    Last biologic Humira - last dose - 40 u qow.  Last dose last week.       PTA Medications   Medication Sig    HUMIRA PEN PnKt injection     tramadol (ULTRAM) 50 mg tablet Take 50 mg by mouth every 6 (six) hours as needed for Pain.       Review of patient's allergies indicates:  No Known Allergies    Past Medical History:   Diagnosis Date    Crohn's disease      Past Surgical History:   Procedure Laterality Date    COLON SURGERY  2000     Family History     None        Social History Main Topics    Smoking status: Current Every Day Smoker     Packs/day: 0.25     Years: 10.00     Types: Cigarettes    Smokeless tobacco: Not on file    Alcohol use No    Drug use: No    Sexual activity: Not Currently     Review of Systems   Constitutional: Positive for activity change.   HENT: Negative.    Eyes: Negative.    Respiratory: Negative.    Cardiovascular: Negative.    Gastrointestinal: Positive for abdominal pain.   Endocrine: Negative.    Genitourinary: Negative.    Musculoskeletal: Negative.    Skin: Negative.    Allergic/Immunologic: Negative.    Neurological:  Negative.    Psychiatric/Behavioral: Negative.      Objective:     Vital Signs (Most Recent):  Temp: 97.3 °F (36.3 °C) (01/17/18 1223)  Pulse: 104 (01/17/18 1223)  Resp: 16 (01/17/18 1223)  BP: 111/62 (01/17/18 1223)  SpO2: 99 % (01/17/18 1223) Vital Signs (24h Range):  Temp:  [96.9 °F (36.1 °C)-98.6 °F (37 °C)] 97.3 °F (36.3 °C)  Pulse:  [] 104  Resp:  [16-18] 16  SpO2:  [96 %-100 %] 99 %  BP: (100-113)/(55-62) 111/62     Weight: 61.1 kg (134 lb 11.2 oz)  Body mass index is 16.84 kg/m².    Physical Exam   Constitutional: He is oriented to person, place, and time. He appears well-developed. No distress.   HENT:   Head: Normocephalic and atraumatic.   Eyes: EOM are normal.   Neck: Normal range of motion.   Cardiovascular: Normal rate and intact distal pulses.    Pulmonary/Chest: Effort normal.   Abdominal: Soft. He exhibits no distension. There is tenderness. There is no rebound and no guarding.   Enterocutaneous fistulas to the lower midline and RLQ, brown liquid stool from ECF. Additional fistula on the R flank.   Musculoskeletal: Normal range of motion.   Neurological: He is alert and oriented to person, place, and time.   Skin: Skin is warm and dry.   Psychiatric: He has a normal mood and affect.     Significant Labs:  BMP (Last 3 Results):   Recent Labs  Lab 01/10/18  1701 01/11/18  0357  01/15/18  0539 01/16/18  0445 01/17/18  0537   GLU 85 71  < > 114* 90 106    137  < > 134* 133* 132*   K 4.3 4.3  < > 4.4 4.4 4.3    106  < > 99 98 96   CO2 25 24  < > 27 28 30*   BUN 11 14  < > 16 14 14   CREATININE 0.8 0.8  < > 0.8 0.9 0.8   CALCIUM 8.4* 8.1*  < > 8.8 8.9 8.8   MG 1.9 2.0  --   --   --   --    < > = values in this interval not displayed.  CBC (Last 3 Results):   Recent Labs  Lab 01/15/18  0539 01/16/18  0445 01/17/18  0537   WBC 5.97 8.23 7.12   RBC 3.83* 3.83* 3.72*   HGB 9.2* 9.0* 8.5*   HCT 29.2* 29.5* 27.9*    258 249   MCV 76* 77* 75*   MCH 24.0* 23.5* 22.8*   MCHC 31.5* 30.5*  30.5*     CMP (Last 3 Results):   Recent Labs  Lab 01/15/18  0539 01/16/18  0445 01/17/18  0537   * 90 106   CALCIUM 8.8 8.9 8.8   ALBUMIN 2.7* 2.7* 2.7*   PROT 7.8 7.6 7.8   * 133* 132*   K 4.4 4.4 4.3   CO2 27 28 30*   CL 99 98 96   BUN 16 14 14   CREATININE 0.8 0.9 0.8   ALKPHOS 116 115 114   ALT 17 14 14   AST 24 13 14   BILITOT 0.6 0.8 0.6       Significant Diagnostics:    1/10/18  Fistulous tract arising from the anterior margin of the ileocolonic anastomosis with associated contrast material extending into the iliacus and lateral abdominal wall musculature with cutaneous fistulous tracts in the right lower quadrant of the abdomen and right posterolateral subcutaneous tissues adjacent to the iliac bone.    Splenomegaly.    Assessment/Plan:     * Enterocutaneous fistula    Plan for admission, TPN, CTE. Follow nutrition labs.     -Continue PO diet, no significant increase in fistula output.  -New PICC today, restart TPN once picc placed   -Continue antibiotics   -WOCN following.  -OR  exlap 1/18              Gagandeep Gordillo MD  Colorectal Surgery  [facility]    Multiple fistula openings most likely from delayed presentation of anastomotic leak, ileocolic.    Discussed with pt at length.  Will need elective resection after nutritional support.  Likely will need resection anastomosis with diverting stoma +/- anastomosis.  Ureteral stents.

## 2018-01-17 NOTE — CONSULTS
Ostomy care consulted to kelly patient for colostomy- surgery 1/18/18.  The LLQ marked for colostomy, reading material and supplies left for patient.  Patient upset- ' oh, is that what they're doing to me? I wish I knew that.  That's the one thing I don't want! IF I wake up, I have to wake up to that?  Now I'll always have this lump on my abdomen.'  Attempted to discuss care of ostomy, pouch design,- patient states he is aware of what to do, he has used those pouches on his fistulas before.  Discussed with patient follow-up lessons, receptive but remains upset about surgery.  KAMAR Ng NP, notified.  Fistula pouch remains intact to lower abdomen/no leaking.

## 2018-01-17 NOTE — CONSULTS
Double lumen PICC placed in right brachial vein of VITALY, 34cm in length with 0cm exposed and 27cm arm circumference. Lot#[id number].

## 2018-01-17 NOTE — ASSESSMENT & PLAN NOTE
-Continue PO diet, no significant increase in fistula output.  -New PICC today, restart TPN once picc placed   -Continue antibiotics   -WOCN following.  -OR  exlap 1/18

## 2018-01-17 NOTE — PLAN OF CARE
Problem: Patient Care Overview  Goal: Plan of Care Review  Outcome: Ongoing (interventions implemented as appropriate)  No acute events throughout the day, VS and assessment per flow sheet, patient progressing towards goals as tolerated, plan of care reviewed with Pedro Nelson, all concerns addressed, will continue to monitor.

## 2018-01-17 NOTE — ASSESSMENT & PLAN NOTE
Plan for admission, TPN, CTE. Follow nutrition labs.     -Continue PO diet, no significant increase in fistula output.  -New PICC today, restart TPN once picc placed   -Continue antibiotics   -WOCN following.  -OR  exlap 1/18

## 2018-01-17 NOTE — ANESTHESIA PREPROCEDURE EVALUATION
[facility]  Anesthesia Pre-Operative Evaluation         Patient Name: Pedro Nelson  YOB: 1975  MRN: [id number]    SUBJECTIVE:     Pre-operative evaluation for Procedure(s) (LRB):  EXPLORATORY-LAPAROTOMY (N/A)     01/17/2018    Pedro Nelson is a 42 y.o. male w/ a significant PMHx of Crohn's disease s/p ileocolectomy in May 2017 now with enterocutaneous fistula .    Patient now presents for the above procedure(s).      LDA:        PICC Single Lumen right brachial (Active)   Site Assessment Clean;Dry;No redness;No swelling;Intact 1/15/2018  7:58 AM   Line Status Infusing 1/15/2018  7:58 AM   Dressing Type Transparent 1/14/2018  8:40 PM   Dressing Status Biopatch in place 1/14/2018  8:40 PM   Dressing Intervention Dressing reinforced 1/13/2018  8:00 PM   Dressing Change Due 01/17/18 1/14/2018  8:40 PM   Daily Line Review Performed 1/14/2018  8:00 AM   Number of days:             Peripheral IV - Single Lumen 01/15/18 0302 Left Wrist (Active)   Site Assessment Clean;Dry;Intact 1/16/2018  8:00 PM   Line Status Saline locked 1/16/2018  8:00 PM   Dressing Status Clean;Dry;Intact 1/16/2018  8:00 PM   Number of days: 2            Peripheral IV - Single Lumen 01/16/18 1255 Left Upper Arm (Active)   Site Assessment Clean;Intact;Dry;No redness;No swelling 1/16/2018  8:00 PM   Line Status Saline locked 1/16/2018  8:00 PM   Dressing Status Clean;Dry;Intact 1/16/2018  8:00 PM   Number of days: 0       Prev airway:   Placement Date: 05/26/17; Placement Time: 0738; Method of Intubation: Direct laryngoscopy; Inserted by: Other (JERRY Logan); Airway Device: Endotracheal Tube; Mask Ventilation: Easy; Intubated: Postinduction; Airway Device Size: 8.0; Style: Cuffed; Cuff Inflation: Minimal occlusive pressure; Inflation Amount: 4; Placement Verified By: Auscultation, Capnometry; Grade: Grade I; Complicating Factors: None;     Drips: None documented.      Patient Active Problem List   Diagnosis     Crohn's disease of small intestine with abscess    Crohn's disease with abscess    Enterocutaneous fistula    Crohn's disease of ileum with fistula    Malnutrition       Review of patient's allergies indicates:  No Known Allergies    Current Inpatient Medications:      Current Facility-Administered Medications on File Prior to Encounter   Medication Dose Route Frequency Provider Last Rate Last Dose    acetaminophen tablet 650 mg  650 mg Oral Q8H PRN Reed Fostre MD        acetaminophen tablet 650 mg  650 mg Oral Q4H PRN Reed Foster MD   650 mg at 01/14/18 2051    barium 0.1 % suspension 450 mL  450 mL Oral ONCE PRN Arabella Preston MD   450 mL at 01/13/18 1600    barium 0.1 % suspension 450 mL  450 mL Oral ONCE PRN JONNA. Benjamin Upton MD        bisacodyl EC tablet 20 mg  20 mg Oral Once Michelle gN NP        ciprofloxacin (CIPRO)400mg/200ml D5W IVPB 400 mg  400 mg Intravenous Q12H Gagandeep Gordillo  mL/hr at 01/17/18 0345 400 mg at 01/17/18 0345    cyanocobalamin injection 1,000 mcg  1,000 mcg Intramuscular Q30 Days Gagandeep Gordillo MD        diphenhydrAMINE injection 25 mg  25 mg Intravenous Q4H PRN Reed Foster MD        enoxaparin injection 40 mg  40 mg Subcutaneous Daily Gagandeep Gordillo MD   40 mg at 01/16/18 1742    folic acid 1 mg in sodium chloride 0.9% 100 mL IVPB  1 mg Intravenous Daily Gagandeep Gordillo  mL/hr at 01/16/18 1011 1 mg at 01/16/18 1011    hydrocodone-acetaminophen 10-325mg per tablet 1 tablet  1 tablet Oral Q4H PRN Reed Foster MD   1 tablet at 01/17/18 0528    hydrocodone-acetaminophen 5-325mg per tablet 1 tablet  1 tablet Oral Q4H PRN Reed Foster MD   1 tablet at 01/12/18 1634    metronidazole IVPB 500 mg  500 mg Intravenous Q8H Gagandeep Gordillo  mL/hr at 01/17/18 0528 500 mg at 01/17/18 0528    metroNIDAZOLE tablet 500 mg  500 mg Oral Once Michelle Ng NP        metroNIDAZOLE tablet 500 mg  500 mg Oral Once Michelle Ng NP        metroNIDAZOLE tablet 500 mg  500  mg Oral Once Michelle Ng NP        neomycin tablet 1,000 mg  1,000 mg Oral Once Michelle Ng NP        neomycin tablet 1,000 mg  1,000 mg Oral Once Michelle Ng NP        neomycin tablet 1,000 mg  1,000 mg Oral Once Michelle Ng NP        omnipaque oral solution 15 mL  15 mL Oral PRN Maddison Ulrich MD   15 mL at 01/11/18 1013    ondansetron injection 4 mg  4 mg Intravenous Q6H PRN Reed Foster MD        polyethylene glycol packet 17 g  17 g Oral Q1H Michelle Ng NP        promethazine (PHENERGAN) 6.25 mg in dextrose 5 % 50 mL IVPB  6.25 mg Intravenous Q6H PRN Reed Foster MD        ramelteon tablet 8 mg  8 mg Oral Nightly PRN Reed Foster MD        sodium chloride 0.9% flush 3 mL  3 mL Intravenous PRN Reed Foster MD        TPN ADULT PERIPHERAL LINE CUSTOM    Intravenous Continuous Michelle Ng NP 75 mL/hr at 01/16/18 2302       Current Outpatient Prescriptions on File Prior to Encounter   Medication Sig Dispense Refill    HUMIRA PEN PnKt injection   1    tramadol (ULTRAM) 50 mg tablet Take 50 mg by mouth every 6 (six) hours as needed for Pain.         Past Surgical History:   Procedure Laterality Date    COLON SURGERY  2000       Social History     Social History    Marital status:      Spouse name: N/A    Number of children: N/A    Years of education: N/A     Occupational History    Not on file.     Social History Main Topics    Smoking status: Current Every Day Smoker     Packs/day: 0.25     Years: 10.00     Types: Cigarettes    Smokeless tobacco: Not on file    Alcohol use No    Drug use: No    Sexual activity: Not Currently     Other Topics Concern    Not on file     Social History Narrative    No narrative on file       OBJECTIVE:     Vital Signs Range (Last 24H):  Temp:  [36.1 °C (96.9 °F)-37 °C (98.6 °F)]   Pulse:  [71-92]   Resp:  [16-18]   BP: (100-113)/(55-68)   SpO2:  [96 %-100 %]       CBC:   Recent Labs      01/16/18   5197   01/17/18   0537   WBC  8.23  7.12   RBC  3.83*  3.72*   HGB  9.0*  8.5*   HCT  29.5*  27.9*   PLT  258  249   MCV  77*  75*   MCH  23.5*  22.8*   MCHC  30.5*  30.5*       CMP:   Recent Labs      01/16/18   0445  01/17/18   0537   NA  133*  132*   K  4.4  4.3   CL  98  96   CO2  28  30*   BUN  14  14   CREATININE  0.9  0.8   GLU  90  106   PHOS  3.3  3.1   CALCIUM  8.9  8.8   ALBUMIN  2.7*  2.7*   PROT  7.6  7.8   ALKPHOS  115  114   ALT  14  14   AST  13  14   BILITOT  0.8  0.6       INR:  No results for input(s): PT, INR, PROTIME, APTT in the last 72 hours.    Diagnostic Studies: No relevant studies.    EKG: No recent studies available.    2D ECHO:  No results found for this or any previous visit.      ASSESSMENT/PLAN:         Anesthesia Evaluation    I have reviewed the Patient Summary Reports.     I have reviewed the Medications.     Review of Systems  Anesthesia Hx:  No problems with previous Anesthesia  History of prior surgery of interest to airway management or planning: Previous anesthesia: General Denies Family Hx of Anesthesia complications.   Denies Personal Hx of Anesthesia complications.   Social:  Smoker    Hematology/Oncology:         -- Anemia:   EENT/Dental:EENT/Dental Normal   Cardiovascular:   Exercise tolerance: good Denies Hypertension.   Denies Angina. ECG has been reviewed.    >4 mets   Pulmonary:  Pulmonary Normal    Renal/:  Renal/ Normal     Hepatic/GI:     Crohns with enterocutaneous fistula   Neurological:  Neurology Normal Denies Seizures.    Endocrine:  Endocrine Normal Denies Diabetes.    Psych:  Psychiatric Normal           Physical Exam  General:  Cachexia    Airway/Jaw/Neck:  Airway Findings: Mouth Opening: Small, but > 3cm Tongue: Normal  General Airway Assessment: Adult, Possible difficult intubation  Mallampati: III  Improves to II with phonation.  TM Distance: Normal, at least 6 cm  Jaw/Neck Findings:     Neck ROM: Normal ROM      Dental:  Dental Findings: molar caps, Upper  Dentures, Lower Dentures   Chest/Lungs:  Chest/Lungs Findings: Clear to auscultation, Normal Respiratory Rate     Heart/Vascular:  Heart Findings: Rate: Normal  Rhythm: Regular Rhythm        Mental Status:  Mental Status Findings:  Cooperative, Alert and Oriented         Anesthesia Plan  Type of Anesthesia, risks & benefits discussed:  Anesthesia Type:  general  Patient's Preference:   Intra-op Monitoring Plan: standard ASA monitors  Intra-op Monitoring Plan Comments:   Post Op Pain Control Plan: per primary service following discharge from PACU  Post Op Pain Control Plan Comments:   Induction:   IV  Beta Blocker:  Patient is not currently on a Beta-Blocker (No further documentation required).       Informed Consent: Patient understands risks and agrees with Anesthesia plan.  Questions answered. Anesthesia consent signed with patient.  ASA Score: 3     Day of Surgery Review of History & Physical: I have interviewed and examined the patient. I have reviewed the patient's H&P dated:    H&P update referred to the provider.         Ready For Surgery From Anesthesia Perspective.

## 2018-01-17 NOTE — PROGRESS NOTES
[facility]  Colorectal Surgery  Progress Note    Patient Name: Pedro Nelson  MRN: [id number]  Admission Date: 1/10/2018  Hospital Length of Stay: 7 days  Attending Physician: OKSANA Upton MD    Subjective:     Interval History:   No acte events overnight. Plan for OR tomorrow, prepping today.      Post-Op Info:  * No surgery found *          Medications:  Continuous Infusions:   TPN ADULT CENTRAL LINE CUSTOM 75 mL/hr at 01/16/18 2302     Scheduled Meds:   ciprofloxacin  400 mg Intravenous Q12H    cyanocobalamin  1,000 mcg Intramuscular Q30 Days    enoxaparin  40 mg Subcutaneous Daily    folic acid (FOLVITE) IVPB 1 mg  1 mg Intravenous Daily    metronidazole  500 mg Intravenous Q8H    metroNIDAZOLE  500 mg Oral Once    metroNIDAZOLE  500 mg Oral Once    metroNIDAZOLE  500 mg Oral Once    neomycin  1,000 mg Oral Once    neomycin  1,000 mg Oral Once    neomycin  1,000 mg Oral Once    polyethylene glycol  17 g Oral Q1H     PRN Meds:   acetaminophen    acetaminophen    barium    barium    diphenhydrAMINE    hydrocodone-acetaminophen 10-325mg    hydrocodone-acetaminophen 5-325mg    omnipaque    ondansetron    promethazine (PHENERGAN) IVPB    ramelteon    sodium chloride 0.9%        Objective:     Vital Signs (Most Recent):  Temp: 98.5 °F (36.9 °C) (01/17/18 0752)  Pulse: 71 (01/17/18 0752)  Resp: 18 (01/17/18 0752)  BP: (!) 110/56 (01/17/18 0752)  SpO2: 99 % (01/17/18 0752) Vital Signs (24h Range):  Temp:  [96.9 °F (36.1 °C)-98.6 °F (37 °C)] 98.5 °F (36.9 °C)  Pulse:  [71-92] 71  Resp:  [16-18] 18  SpO2:  [96 %-100 %] 99 %  BP: (100-113)/(55-68) 110/56     Intake/Output - Last 3 Shifts       01/15 0700 - 01/16 0659 01/16 0700 - 01/17 0659 01/17 0700 - 01/18 0659    P.O. 720      I.V. (mL/kg) 850 (13.9)      TPN       Total Intake(mL/kg) 1570 (25.7)      Urine (mL/kg/hr) 0 (0)      Emesis/NG output 0 (0)      Other 100 (0.1)      Stool 0 (0)      Total Output 100        Net  +1470               Urine Occurrence 3 x      Stool Occurrence 0 x      Emesis Occurrence 0 x            Physical Exam   Constitutional: He is oriented to person, place, and time. He appears well-developed and well-nourished. No distress.   HENT:   Head: Normocephalic and atraumatic.   Eyes: EOM are normal.   Neck: Normal range of motion.   Cardiovascular: Normal rate.    Pulmonary/Chest: Effort normal. No respiratory distress.   Abdominal: Soft. He exhibits no distension. There is no tenderness.   Ostomy bag on anterior abdominal wall, thin brown stool in bag mostly from RLQ fistula. R flank fistula with minimal drainage.   Neurological: He is alert and oriented to person, place, and time.   Skin: Skin is warm and dry.   Psychiatric: He has a normal mood and affect. His behavior is normal. Thought content normal.     Significant Labs:  BMP (Last 3 Results):   Recent Labs  Lab 01/10/18  1701 01/11/18  0357  01/15/18  0539 01/16/18  0445 01/17/18  0537   GLU 85 71  < > 114* 90 106    137  < > 134* 133* 132*   K 4.3 4.3  < > 4.4 4.4 4.3    106  < > 99 98 96   CO2 25 24  < > 27 28 30*   BUN 11 14  < > 16 14 14   CREATININE 0.8 0.8  < > 0.8 0.9 0.8   CALCIUM 8.4* 8.1*  < > 8.8 8.9 8.8   MG 1.9 2.0  --   --   --   --    < > = values in this interval not displayed.  CBC (Last 3 Results):     Recent Labs  Lab 01/15/18  0539 01/16/18  0445 01/17/18  0537   WBC 5.97 8.23 7.12   RBC 3.83* 3.83* 3.72*   HGB 9.2* 9.0* 8.5*   HCT 29.2* 29.5* 27.9*    258 249   MCV 76* 77* 75*   MCH 24.0* 23.5* 22.8*   MCHC 31.5* 30.5* 30.5*     Assessment/Plan:     * Enterocutaneous fistula    -Continue PO diet, no significant increase in fistula output.  -New PICC today, restart TPN once picc placed   -Continue antibiotics   -WOCN following.  -OR  exlap 1/18        Malnutrition    Cont TPN/diet              Michelle Ng NP  Colorectal Surgery  [facility]

## 2018-01-17 NOTE — PLAN OF CARE
Problem: Patient Care Overview  Goal: Plan of Care Review  Outcome: Ongoing (interventions implemented as appropriate)  Patient AAOx4. No complaints of pain. VS stable, afrebrile. Neurovascular intact. Skin intact, with exception of abd fistula x2 and fistula on flank. Free from falls. Frequent rounds made for safety, pain and comfort. Bed at lowest position, call light within reach, side rails up x2.

## 2018-01-18 ENCOUNTER — ANESTHESIA (OUTPATIENT)
Dept: SURGERY | Facility: HOSPITAL | Age: 43
DRG: 908 | End: 2018-01-18
Payer: COMMERCIAL

## 2018-01-18 LAB
BASOPHILS # BLD AUTO: 0.03 K/UL
BASOPHILS NFR BLD: 0.4 %
DIFFERENTIAL METHOD: ABNORMAL
EOSINOPHIL # BLD AUTO: 0.1 K/UL
EOSINOPHIL NFR BLD: 1 %
ERYTHROCYTE [DISTWIDTH] IN BLOOD BY AUTOMATED COUNT: 16.6 %
HCT VFR BLD AUTO: 27.3 %
HGB BLD-MCNC: 8.5 G/DL
IMM GRANULOCYTES # BLD AUTO: 0.04 K/UL
IMM GRANULOCYTES NFR BLD AUTO: 0.5 %
LYMPHOCYTES # BLD AUTO: 1.6 K/UL
LYMPHOCYTES NFR BLD: 19.6 %
MCH RBC QN AUTO: 23.4 PG
MCHC RBC AUTO-ENTMCNC: 31.1 G/DL
MCV RBC AUTO: 75 FL
MONOCYTES # BLD AUTO: 1.1 K/UL
MONOCYTES NFR BLD: 13.4 %
NEUTROPHILS # BLD AUTO: 5.2 K/UL
NEUTROPHILS NFR BLD: 65.1 %
NRBC BLD-RTO: 0 /100 WBC
PLATELET # BLD AUTO: 258 K/UL
PMV BLD AUTO: 9.5 FL
POCT GLUCOSE: 197 MG/DL (ref 70–110)
RBC # BLD AUTO: 3.63 M/UL
WBC # BLD AUTO: 7.94 K/UL

## 2018-01-18 PROCEDURE — 37000008 HC ANESTHESIA 1ST 15 MINUTES: Performed by: COLON & RECTAL SURGERY

## 2018-01-18 PROCEDURE — 83735 ASSAY OF MAGNESIUM: CPT

## 2018-01-18 PROCEDURE — S0030 INJECTION, METRONIDAZOLE: HCPCS | Performed by: COLON & RECTAL SURGERY

## 2018-01-18 PROCEDURE — 63600175 PHARM REV CODE 636 W HCPCS: Performed by: STUDENT IN AN ORGANIZED HEALTH CARE EDUCATION/TRAINING PROGRAM

## 2018-01-18 PROCEDURE — 0DUB07Z SUPPLEMENT ILEUM WITH AUTOLOGOUS TISSUE SUBSTITUTE, OPEN APPROACH: ICD-10-PCS | Performed by: COLON & RECTAL SURGERY

## 2018-01-18 PROCEDURE — 63600175 PHARM REV CODE 636 W HCPCS: Performed by: ANESTHESIOLOGY

## 2018-01-18 PROCEDURE — 25000003 PHARM REV CODE 250: Performed by: COLON & RECTAL SURGERY

## 2018-01-18 PROCEDURE — 88304 TISSUE EXAM BY PATHOLOGIST: CPT | Mod: 26,,, | Performed by: PATHOLOGY

## 2018-01-18 PROCEDURE — D9220A PRA ANESTHESIA: Mod: ANES,,, | Performed by: ANESTHESIOLOGY

## 2018-01-18 PROCEDURE — 25000003 PHARM REV CODE 250: Performed by: STUDENT IN AN ORGANIZED HEALTH CARE EDUCATION/TRAINING PROGRAM

## 2018-01-18 PROCEDURE — 63600175 PHARM REV CODE 636 W HCPCS: Performed by: COLON & RECTAL SURGERY

## 2018-01-18 PROCEDURE — C1769 GUIDE WIRE: HCPCS | Performed by: COLON & RECTAL SURGERY

## 2018-01-18 PROCEDURE — 11000001 HC ACUTE MED/SURG PRIVATE ROOM

## 2018-01-18 PROCEDURE — A4216 STERILE WATER/SALINE, 10 ML: HCPCS | Performed by: COLON & RECTAL SURGERY

## 2018-01-18 PROCEDURE — 27000221 HC OXYGEN, UP TO 24 HOURS

## 2018-01-18 PROCEDURE — 84100 ASSAY OF PHOSPHORUS: CPT

## 2018-01-18 PROCEDURE — 44640 REPAIR BOWEL-SKIN FISTULA: CPT | Mod: 22,,, | Performed by: COLON & RECTAL SURGERY

## 2018-01-18 PROCEDURE — 85025 COMPLETE CBC W/AUTO DIFF WBC: CPT

## 2018-01-18 PROCEDURE — 36000708 HC OR TIME LEV III 1ST 15 MIN: Performed by: COLON & RECTAL SURGERY

## 2018-01-18 PROCEDURE — 52005 CYSTO W/URTRL CATHJ: CPT | Mod: ,,, | Performed by: UROLOGY

## 2018-01-18 PROCEDURE — C1729 CATH, DRAINAGE: HCPCS | Performed by: COLON & RECTAL SURGERY

## 2018-01-18 PROCEDURE — 36000709 HC OR TIME LEV III EA ADD 15 MIN: Performed by: COLON & RECTAL SURGERY

## 2018-01-18 PROCEDURE — C1758 CATHETER, URETERAL: HCPCS | Performed by: COLON & RECTAL SURGERY

## 2018-01-18 PROCEDURE — 25000003 PHARM REV CODE 250: Performed by: NURSE ANESTHETIST, CERTIFIED REGISTERED

## 2018-01-18 PROCEDURE — 63600175 PHARM REV CODE 636 W HCPCS: Performed by: NURSE ANESTHETIST, CERTIFIED REGISTERED

## 2018-01-18 PROCEDURE — 0DNB0ZZ RELEASE ILEUM, OPEN APPROACH: ICD-10-PCS | Performed by: COLON & RECTAL SURGERY

## 2018-01-18 PROCEDURE — D9220A PRA ANESTHESIA: Mod: CRNA,,, | Performed by: NURSE ANESTHETIST, CERTIFIED REGISTERED

## 2018-01-18 PROCEDURE — 0T768DZ DILATION OF RIGHT URETER WITH INTRALUMINAL DEVICE, VIA NATURAL OR ARTIFICIAL OPENING ENDOSCOPIC: ICD-10-PCS | Performed by: UROLOGY

## 2018-01-18 PROCEDURE — 80053 COMPREHEN METABOLIC PANEL: CPT

## 2018-01-18 PROCEDURE — A4216 STERILE WATER/SALINE, 10 ML: HCPCS | Performed by: STUDENT IN AN ORGANIZED HEALTH CARE EDUCATION/TRAINING PROGRAM

## 2018-01-18 PROCEDURE — 0DBB0ZZ EXCISION OF ILEUM, OPEN APPROACH: ICD-10-PCS | Performed by: COLON & RECTAL SURGERY

## 2018-01-18 PROCEDURE — 11042 DBRDMT SUBQ TIS 1ST 20SQCM/<: CPT | Mod: 59,,, | Performed by: COLON & RECTAL SURGERY

## 2018-01-18 PROCEDURE — 88304 TISSUE EXAM BY PATHOLOGIST: CPT | Performed by: PATHOLOGY

## 2018-01-18 PROCEDURE — 71000033 HC RECOVERY, INTIAL HOUR: Performed by: COLON & RECTAL SURGERY

## 2018-01-18 PROCEDURE — C9290 INJ, BUPIVACAINE LIPOSOME: HCPCS | Performed by: COLON & RECTAL SURGERY

## 2018-01-18 PROCEDURE — 82962 GLUCOSE BLOOD TEST: CPT | Performed by: COLON & RECTAL SURGERY

## 2018-01-18 PROCEDURE — 27201423 OPTIME MED/SURG SUP & DEVICES STERILE SUPPLY: Performed by: COLON & RECTAL SURGERY

## 2018-01-18 PROCEDURE — 49905 OMENTAL FLAP INTRA-ABDOM: CPT | Mod: ,,, | Performed by: COLON & RECTAL SURGERY

## 2018-01-18 PROCEDURE — S0028 INJECTION, FAMOTIDINE, 20 MG: HCPCS | Performed by: NURSE ANESTHETIST, CERTIFIED REGISTERED

## 2018-01-18 PROCEDURE — C1765 ADHESION BARRIER: HCPCS | Performed by: COLON & RECTAL SURGERY

## 2018-01-18 PROCEDURE — 88305 TISSUE EXAM BY PATHOLOGIST: CPT | Performed by: PATHOLOGY

## 2018-01-18 PROCEDURE — 37000009 HC ANESTHESIA EA ADD 15 MINS: Performed by: COLON & RECTAL SURGERY

## 2018-01-18 PROCEDURE — 3E0M05Z INTRODUCTION OF ADHESION BARRIER INTO PERITONEAL CAVITY, OPEN APPROACH: ICD-10-PCS | Performed by: COLON & RECTAL SURGERY

## 2018-01-18 PROCEDURE — 71000039 HC RECOVERY, EACH ADD'L HOUR: Performed by: COLON & RECTAL SURGERY

## 2018-01-18 PROCEDURE — 88305 TISSUE EXAM BY PATHOLOGIST: CPT | Mod: 26,,, | Performed by: PATHOLOGY

## 2018-01-18 DEVICE — MEMBRANE SEPRAFILM 5 X 6: Type: IMPLANTABLE DEVICE | Site: ABDOMEN | Status: FUNCTIONAL

## 2018-01-18 RX ORDER — ACETAMINOPHEN 10 MG/ML
1000 INJECTION, SOLUTION INTRAVENOUS EVERY 8 HOURS
Status: COMPLETED | OUTPATIENT
Start: 2018-01-18 | End: 2018-01-19

## 2018-01-18 RX ORDER — SODIUM CHLORIDE 0.9 % (FLUSH) 0.9 %
3 SYRINGE (ML) INJECTION
Status: DISCONTINUED | OUTPATIENT
Start: 2018-01-18 | End: 2018-01-18 | Stop reason: HOSPADM

## 2018-01-18 RX ORDER — SODIUM CHLORIDE 9 MG/ML
INJECTION, SOLUTION INTRAVENOUS CONTINUOUS PRN
Status: DISCONTINUED | OUTPATIENT
Start: 2018-01-18 | End: 2018-01-18

## 2018-01-18 RX ORDER — FAMOTIDINE 10 MG/ML
INJECTION INTRAVENOUS
Status: DISCONTINUED | OUTPATIENT
Start: 2018-01-18 | End: 2018-01-18

## 2018-01-18 RX ORDER — NALOXONE HCL 0.4 MG/ML
0.02 VIAL (ML) INJECTION
Status: DISCONTINUED | OUTPATIENT
Start: 2018-01-18 | End: 2018-01-28 | Stop reason: HOSPADM

## 2018-01-18 RX ORDER — FENTANYL CITRATE 50 UG/ML
INJECTION, SOLUTION INTRAMUSCULAR; INTRAVENOUS
Status: DISCONTINUED | OUTPATIENT
Start: 2018-01-18 | End: 2018-01-18

## 2018-01-18 RX ORDER — HYDROMORPHONE HCL IN 0.9% NACL 6 MG/30 ML
PATIENT CONTROLLED ANALGESIA SYRINGE INTRAVENOUS CONTINUOUS
Status: DISCONTINUED | OUTPATIENT
Start: 2018-01-18 | End: 2018-01-22

## 2018-01-18 RX ORDER — LIDOCAINE HCL/PF 100 MG/5ML
SYRINGE (ML) INTRAVENOUS
Status: DISCONTINUED | OUTPATIENT
Start: 2018-01-18 | End: 2018-01-18

## 2018-01-18 RX ORDER — PROPOFOL 10 MG/ML
VIAL (ML) INTRAVENOUS
Status: DISCONTINUED | OUTPATIENT
Start: 2018-01-18 | End: 2018-01-18

## 2018-01-18 RX ORDER — GLYCOPYRROLATE 0.2 MG/ML
INJECTION INTRAMUSCULAR; INTRAVENOUS
Status: DISCONTINUED | OUTPATIENT
Start: 2018-01-18 | End: 2018-01-18

## 2018-01-18 RX ORDER — MEPERIDINE HYDROCHLORIDE 50 MG/ML
12.5 INJECTION INTRAMUSCULAR; INTRAVENOUS; SUBCUTANEOUS ONCE AS NEEDED
Status: COMPLETED | OUTPATIENT
Start: 2018-01-18 | End: 2018-01-18

## 2018-01-18 RX ORDER — NEOSTIGMINE METHYLSULFATE 1 MG/ML
INJECTION, SOLUTION INTRAVENOUS
Status: DISCONTINUED | OUTPATIENT
Start: 2018-01-18 | End: 2018-01-18

## 2018-01-18 RX ORDER — ACETAMINOPHEN 10 MG/ML
INJECTION, SOLUTION INTRAVENOUS
Status: DISCONTINUED | OUTPATIENT
Start: 2018-01-18 | End: 2018-01-18

## 2018-01-18 RX ORDER — MIDAZOLAM HYDROCHLORIDE 1 MG/ML
INJECTION, SOLUTION INTRAMUSCULAR; INTRAVENOUS
Status: DISCONTINUED | OUTPATIENT
Start: 2018-01-18 | End: 2018-01-18

## 2018-01-18 RX ORDER — GABAPENTIN 100 MG/1
100 CAPSULE ORAL 3 TIMES DAILY
Status: DISCONTINUED | OUTPATIENT
Start: 2018-01-18 | End: 2018-01-28 | Stop reason: HOSPADM

## 2018-01-18 RX ORDER — BUPIVACAINE HYDROCHLORIDE 2.5 MG/ML
INJECTION, SOLUTION EPIDURAL; INFILTRATION; INTRACAUDAL
Status: DISCONTINUED | OUTPATIENT
Start: 2018-01-18 | End: 2018-01-18 | Stop reason: HOSPADM

## 2018-01-18 RX ORDER — ROCURONIUM BROMIDE 10 MG/ML
INJECTION, SOLUTION INTRAVENOUS
Status: DISCONTINUED | OUTPATIENT
Start: 2018-01-18 | End: 2018-01-18

## 2018-01-18 RX ORDER — FENTANYL CITRATE 50 UG/ML
25 INJECTION, SOLUTION INTRAMUSCULAR; INTRAVENOUS EVERY 5 MIN PRN
Status: COMPLETED | OUTPATIENT
Start: 2018-01-18 | End: 2018-01-18

## 2018-01-18 RX ADMIN — CIPROFLOXACIN 400 MG: 2 INJECTION, SOLUTION INTRAVENOUS at 03:01

## 2018-01-18 RX ADMIN — LIDOCAINE HYDROCHLORIDE 100 MG: 20 INJECTION, SOLUTION INTRAVENOUS at 08:01

## 2018-01-18 RX ADMIN — Medication: at 03:01

## 2018-01-18 RX ADMIN — Medication 10 ML: at 11:01

## 2018-01-18 RX ADMIN — MIDAZOLAM HYDROCHLORIDE 2 MG: 1 INJECTION, SOLUTION INTRAMUSCULAR; INTRAVENOUS at 07:01

## 2018-01-18 RX ADMIN — GLYCOPYRROLATE 0.4 MG: 0.2 INJECTION, SOLUTION INTRAMUSCULAR; INTRAVENOUS at 12:01

## 2018-01-18 RX ADMIN — METRONIDAZOLE 500 MG: 500 INJECTION, SOLUTION INTRAVENOUS at 05:01

## 2018-01-18 RX ADMIN — ONDANSETRON 4 MG: 2 INJECTION INTRAMUSCULAR; INTRAVENOUS at 11:01

## 2018-01-18 RX ADMIN — METRONIDAZOLE 500 MG: 500 INJECTION, SOLUTION INTRAVENOUS at 11:01

## 2018-01-18 RX ADMIN — MEPERIDINE HYDROCHLORIDE 12.5 MG: 50 INJECTION, SOLUTION INTRAMUSCULAR; INTRAVENOUS; SUBCUTANEOUS at 02:01

## 2018-01-18 RX ADMIN — ACETAMINOPHEN 1000 MG: 10 INJECTION, SOLUTION INTRAVENOUS at 08:01

## 2018-01-18 RX ADMIN — CEFTRIAXONE 1 G: 1 INJECTION, SOLUTION INTRAVENOUS at 08:01

## 2018-01-18 RX ADMIN — FENTANYL CITRATE 50 MCG: 50 INJECTION, SOLUTION INTRAMUSCULAR; INTRAVENOUS at 11:01

## 2018-01-18 RX ADMIN — Medication 10 ML: at 05:01

## 2018-01-18 RX ADMIN — GABAPENTIN 100 MG: 100 CAPSULE ORAL at 03:01

## 2018-01-18 RX ADMIN — HYDROCODONE BITARTRATE AND ACETAMINOPHEN 1 TABLET: 10; 325 TABLET ORAL at 12:01

## 2018-01-18 RX ADMIN — FENTANYL CITRATE 100 MCG: 50 INJECTION, SOLUTION INTRAMUSCULAR; INTRAVENOUS at 08:01

## 2018-01-18 RX ADMIN — ROCURONIUM BROMIDE 50 MG: 10 INJECTION, SOLUTION INTRAVENOUS at 09:01

## 2018-01-18 RX ADMIN — IBUPROFEN 800 MG: 800 INJECTION INTRAVENOUS at 01:01

## 2018-01-18 RX ADMIN — GABAPENTIN 100 MG: 100 CAPSULE ORAL at 09:01

## 2018-01-18 RX ADMIN — SODIUM CHLORIDE, PRESERVATIVE FREE 3 ML: 5 INJECTION INTRAVENOUS at 05:01

## 2018-01-18 RX ADMIN — FENTANYL CITRATE 25 MCG: 50 INJECTION, SOLUTION INTRAMUSCULAR; INTRAVENOUS at 12:01

## 2018-01-18 RX ADMIN — ONDANSETRON 4 MG: 2 INJECTION INTRAMUSCULAR; INTRAVENOUS at 12:01

## 2018-01-18 RX ADMIN — Medication 10 ML: at 12:01

## 2018-01-18 RX ADMIN — ROCURONIUM BROMIDE 50 MG: 10 INJECTION, SOLUTION INTRAVENOUS at 08:01

## 2018-01-18 RX ADMIN — IBUPROFEN 800 MG: 800 INJECTION INTRAVENOUS at 09:01

## 2018-01-18 RX ADMIN — FENTANYL CITRATE 50 MCG: 50 INJECTION, SOLUTION INTRAMUSCULAR; INTRAVENOUS at 10:01

## 2018-01-18 RX ADMIN — SUGAMMADEX 122 MG: 100 INJECTION, SOLUTION INTRAVENOUS at 12:01

## 2018-01-18 RX ADMIN — SODIUM CHLORIDE: 0.9 INJECTION, SOLUTION INTRAVENOUS at 07:01

## 2018-01-18 RX ADMIN — FAMOTIDINE 20 MG: 10 INJECTION, SOLUTION INTRAVENOUS at 08:01

## 2018-01-18 RX ADMIN — NEOSTIGMINE METHYLSULFATE 5 MG: 1 INJECTION INTRAVENOUS at 12:01

## 2018-01-18 RX ADMIN — CIPROFLOXACIN 400 MG: 2 INJECTION, SOLUTION INTRAVENOUS at 05:01

## 2018-01-18 RX ADMIN — ROCURONIUM BROMIDE 50 MG: 10 INJECTION, SOLUTION INTRAVENOUS at 10:01

## 2018-01-18 RX ADMIN — METRONIDAZOLE 500 MG: 500 INJECTION, SOLUTION INTRAVENOUS at 03:01

## 2018-01-18 RX ADMIN — ACETAMINOPHEN 1000 MG: 10 INJECTION, SOLUTION INTRAVENOUS at 01:01

## 2018-01-18 RX ADMIN — HYDROCODONE BITARTRATE AND ACETAMINOPHEN 1 TABLET: 10; 325 TABLET ORAL at 05:01

## 2018-01-18 RX ADMIN — PROPOFOL 150 MG: 10 INJECTION, EMULSION INTRAVENOUS at 08:01

## 2018-01-18 RX ADMIN — Medication 10 ML: at 06:01

## 2018-01-18 RX ADMIN — ACETAMINOPHEN 1000 MG: 10 INJECTION, SOLUTION INTRAVENOUS at 09:01

## 2018-01-18 RX ADMIN — Medication: at 09:01

## 2018-01-18 RX ADMIN — SODIUM CHLORIDE: 0.9 INJECTION, SOLUTION INTRAVENOUS at 10:01

## 2018-01-18 RX ADMIN — DIPHENHYDRAMINE HYDROCHLORIDE 50 MG: 50 INJECTION, SOLUTION INTRAMUSCULAR; INTRAVENOUS at 08:01

## 2018-01-18 NOTE — PLAN OF CARE
Problem: Patient Care Overview  Goal: Plan of Care Review  Outcome: Ongoing (interventions implemented as appropriate)  Patient AAOx4. Pain controlled. VS stable, afrebrile. Neurovascular intact. Skin intact, with exception of fistula x3. Free from falls. Frequent rounds made for safety, pain and comfort. Bed at lowest position, call light within reach, side rails up x2.

## 2018-01-18 NOTE — TRANSFER OF CARE
"Anesthesia Transfer of Care Note    Patient: Pedro Nelson    Procedure(s) Performed: Procedure(s) (LRB):  EXPLORATORY-LAPAROTOMY (N/A)  CATHETERIZATION-URETHRAL (Right)  LYSIS-ADHESION EXTENSIVE- LASTING MORE THAN 2 HRS  REPAIR-FISTULA-ENTEROCUTANEOUS WITH RESECTION  ENTERECTOMY  WRAP-OMENTAL    Patient location: PACU    Anesthesia Type: general    Transport from OR: Transported from OR on 6-10 L/min O2 by face mask with adequate spontaneous ventilation    Post pain: adequate analgesia    Post assessment: no apparent anesthetic complications and tolerated procedure well    Post vital signs: stable    Level of consciousness: awake, alert and lethargic    Nausea/Vomiting: no nausea/vomiting    Complications: none    Transfer of care protocol was followed      Last vitals:   Visit Vitals  /69   Pulse 91   Temp 36.7 °C (98 °F) (Axillary)   Resp 18   Ht 6' 3" (1.905 m)   Wt 61.1 kg (134 lb 11.2 oz)   SpO2 100%   BMI 16.84 kg/m²     "

## 2018-01-18 NOTE — OP NOTE
[facility]  Operative Note    Date: 01/18/2018    Pre-Op Diagnosis:   1. Enterocutaneous fistulas  2. Chrohn's ileitis    Post-Op Diagnosis: same    Procedure(s) Performed:   1.  Cystoscopy with right ureteral catheter placement    Specimen(s): none    Staff Surgeon: Matt Toledo MD    Assistant Surgeon: Naomi Crockett MD    Anesthesia: General endotracheal anesthesia    Indications: Pedro Nelson is a 42 y.o. male with above history.  Dr. Upton has requested an intra-operative ureteral catheter on the right side to allow for early intra-operative identification and repair of any injuries.      Findings: normal cystourethroscopy, right ureteral catheter placed without complication    Estimated Blood Loss: min    Drains:   1.  right 5 Fr ureteral catheter  2.  16 Fr torres catheter    Procedure in Detail: Upon entering the room the patient was under general anesthesia.  The patient was then placed in the dorsal lithotomy position and prepped and draped in the usual sterile fashion. Preoperative antibiotics were administered per the primary surgeon preference.  Timeout was performed.      A 22 Fr cystoscope was inserted into the urethra and formal cystourethroscopy was performed. The urethra was normal.  The right and left ureteral orifices were in the normal anatomic position. There were no mucosal abnormalities. A 0.38 guide wire was inserted into the right ureteral orifice and advanced to the level of the right renal pelvis. A 5 Fr ureteral catheter was then inserted over the guide wire and the wire was removed. The cystoscope was then removed leaving the ureteral catheter in place.     A 16 Fr torres catheter was inserted and the balloon was filled with 10mL of sterile water. The ureteral catheters were secured in the standard fashion. There were no complications with the procedure and the patient tolerated our procedure well.     The case was then turned over to the primary surgeon.     Naomi  MD Bon

## 2018-01-18 NOTE — PLAN OF CARE
01/18/18 1259   Discharge Reassessment   Assessment Type Discharge Planning Reassessment   Provided patient/caregiver education on the expected discharge date and the discharge plan Yes   Do you have any problems affording any of your prescribed medications? Yes   Discharge Plan A Home with family   Discharge Plan B Home Health;Home with family

## 2018-01-18 NOTE — PROGRESS NOTES
New orders noted for hydromorphone PCA, but patient already got PO pain medication while in the PACU. Dr. Gordillo notified. Advised to hold the PCA until patient returns to the floor, then OK to initiate PCA.

## 2018-01-18 NOTE — NURSING TRANSFER
Nursing Transfer Note      1/18/2018     Transfer To: 543B    Transfer via stretcher    Transfer with iv pump with TPN infusing at 75ml/hr, torres catheter    Transported by PCT    Medicines sent: none    Chart send with patient: Yes    Notified: no family immediately available in waiting room, notified receiving nurse Mario    Patient reassessed at: 1/18/2018 2:30 PM

## 2018-01-19 LAB
ALBUMIN SERPL BCP-MCNC: 2.3 G/DL
ALBUMIN SERPL BCP-MCNC: 2.9 G/DL
ALP SERPL-CCNC: 81 U/L
ALP SERPL-CCNC: 99 U/L
ALT SERPL W/O P-5'-P-CCNC: 12 U/L
ALT SERPL W/O P-5'-P-CCNC: 13 U/L
ANION GAP SERPL CALC-SCNC: 10 MMOL/L
ANION GAP SERPL CALC-SCNC: 4 MMOL/L
AST SERPL-CCNC: 14 U/L
AST SERPL-CCNC: 15 U/L
BASOPHILS # BLD AUTO: 0.04 K/UL
BASOPHILS NFR BLD: 0.3 %
BILIRUB SERPL-MCNC: 0.6 MG/DL
BILIRUB SERPL-MCNC: 0.8 MG/DL
BUN SERPL-MCNC: 16 MG/DL
BUN SERPL-MCNC: 18 MG/DL
CALCIUM SERPL-MCNC: 7.9 MG/DL
CALCIUM SERPL-MCNC: 8.8 MG/DL
CHLORIDE SERPL-SCNC: 100 MMOL/L
CHLORIDE SERPL-SCNC: 99 MMOL/L
CO2 SERPL-SCNC: 25 MMOL/L
CO2 SERPL-SCNC: 26 MMOL/L
CREAT SERPL-MCNC: 0.8 MG/DL
CREAT SERPL-MCNC: 0.9 MG/DL
DIFFERENTIAL METHOD: ABNORMAL
EOSINOPHIL # BLD AUTO: 0.1 K/UL
EOSINOPHIL NFR BLD: 0.8 %
ERYTHROCYTE [DISTWIDTH] IN BLOOD BY AUTOMATED COUNT: 16.6 %
EST. GFR  (AFRICAN AMERICAN): >60 ML/MIN/1.73 M^2
EST. GFR  (AFRICAN AMERICAN): >60 ML/MIN/1.73 M^2
EST. GFR  (NON AFRICAN AMERICAN): >60 ML/MIN/1.73 M^2
EST. GFR  (NON AFRICAN AMERICAN): >60 ML/MIN/1.73 M^2
GLUCOSE SERPL-MCNC: 88 MG/DL
GLUCOSE SERPL-MCNC: 90 MG/DL
HCT VFR BLD AUTO: 27.5 %
HGB BLD-MCNC: 8.5 G/DL
IMM GRANULOCYTES # BLD AUTO: 0.08 K/UL
IMM GRANULOCYTES NFR BLD AUTO: 0.6 %
LYMPHOCYTES # BLD AUTO: 1.5 K/UL
LYMPHOCYTES NFR BLD: 11 %
MAGNESIUM SERPL-MCNC: 1.9 MG/DL
MCH RBC QN AUTO: 23.4 PG
MCHC RBC AUTO-ENTMCNC: 30.9 G/DL
MCV RBC AUTO: 76 FL
MONOCYTES # BLD AUTO: 1 K/UL
MONOCYTES NFR BLD: 7.2 %
NEUTROPHILS # BLD AUTO: 11.2 K/UL
NEUTROPHILS NFR BLD: 80.1 %
NRBC BLD-RTO: 0 /100 WBC
PHOSPHATE SERPL-MCNC: 2.9 MG/DL
PHOSPHATE SERPL-MCNC: 3.4 MG/DL
PLATELET # BLD AUTO: 296 K/UL
PMV BLD AUTO: 10 FL
POTASSIUM SERPL-SCNC: 4.2 MMOL/L
POTASSIUM SERPL-SCNC: 4.2 MMOL/L
PROT SERPL-MCNC: 6.5 G/DL
PROT SERPL-MCNC: 7.7 G/DL
RBC # BLD AUTO: 3.64 M/UL
SODIUM SERPL-SCNC: 129 MMOL/L
SODIUM SERPL-SCNC: 135 MMOL/L
WBC # BLD AUTO: 13.97 K/UL

## 2018-01-19 PROCEDURE — B4185 PARENTERAL SOL 10 GM LIPIDS: HCPCS | Performed by: COLON & RECTAL SURGERY

## 2018-01-19 PROCEDURE — 25000003 PHARM REV CODE 250: Performed by: COLON & RECTAL SURGERY

## 2018-01-19 PROCEDURE — 94770 HC EXHALED C02 TEST: CPT

## 2018-01-19 PROCEDURE — 85025 COMPLETE CBC W/AUTO DIFF WBC: CPT

## 2018-01-19 PROCEDURE — 11000001 HC ACUTE MED/SURG PRIVATE ROOM

## 2018-01-19 PROCEDURE — 80053 COMPREHEN METABOLIC PANEL: CPT

## 2018-01-19 PROCEDURE — 99900035 HC TECH TIME PER 15 MIN (STAT)

## 2018-01-19 PROCEDURE — 63600175 PHARM REV CODE 636 W HCPCS: Performed by: STUDENT IN AN ORGANIZED HEALTH CARE EDUCATION/TRAINING PROGRAM

## 2018-01-19 PROCEDURE — 63600175 PHARM REV CODE 636 W HCPCS: Performed by: COLON & RECTAL SURGERY

## 2018-01-19 PROCEDURE — 84100 ASSAY OF PHOSPHORUS: CPT

## 2018-01-19 PROCEDURE — 97803 MED NUTRITION INDIV SUBSEQ: CPT

## 2018-01-19 PROCEDURE — 99406 BEHAV CHNG SMOKING 3-10 MIN: CPT

## 2018-01-19 PROCEDURE — 25000003 PHARM REV CODE 250: Performed by: STUDENT IN AN ORGANIZED HEALTH CARE EDUCATION/TRAINING PROGRAM

## 2018-01-19 PROCEDURE — A4216 STERILE WATER/SALINE, 10 ML: HCPCS | Performed by: COLON & RECTAL SURGERY

## 2018-01-19 PROCEDURE — 25000003 PHARM REV CODE 250: Performed by: NURSE PRACTITIONER

## 2018-01-19 PROCEDURE — S0030 INJECTION, METRONIDAZOLE: HCPCS | Performed by: COLON & RECTAL SURGERY

## 2018-01-19 RX ORDER — SODIUM CHLORIDE, SODIUM LACTATE, POTASSIUM CHLORIDE, CALCIUM CHLORIDE 600; 310; 30; 20 MG/100ML; MG/100ML; MG/100ML; MG/100ML
INJECTION, SOLUTION INTRAVENOUS CONTINUOUS
Status: DISCONTINUED | OUTPATIENT
Start: 2018-01-19 | End: 2018-01-19

## 2018-01-19 RX ADMIN — Medication: at 08:01

## 2018-01-19 RX ADMIN — CIPROFLOXACIN 400 MG: 2 INJECTION, SOLUTION INTRAVENOUS at 06:01

## 2018-01-19 RX ADMIN — SODIUM CHLORIDE, SODIUM LACTATE, POTASSIUM CHLORIDE, AND CALCIUM CHLORIDE 1000 ML: .6; .31; .03; .02 INJECTION, SOLUTION INTRAVENOUS at 06:01

## 2018-01-19 RX ADMIN — ONDANSETRON 4 MG: 2 INJECTION INTRAMUSCULAR; INTRAVENOUS at 07:01

## 2018-01-19 RX ADMIN — ENOXAPARIN SODIUM 40 MG: 100 INJECTION SUBCUTANEOUS at 06:01

## 2018-01-19 RX ADMIN — Medication 10 ML: at 03:01

## 2018-01-19 RX ADMIN — SOYBEAN OIL 250 ML: 20 INJECTION, SOLUTION INTRAVENOUS at 10:01

## 2018-01-19 RX ADMIN — Medication: at 03:01

## 2018-01-19 RX ADMIN — METRONIDAZOLE 500 MG: 500 INJECTION, SOLUTION INTRAVENOUS at 08:01

## 2018-01-19 RX ADMIN — IBUPROFEN 800 MG: 800 INJECTION INTRAVENOUS at 06:01

## 2018-01-19 RX ADMIN — GABAPENTIN 100 MG: 100 CAPSULE ORAL at 03:01

## 2018-01-19 RX ADMIN — IBUPROFEN 800 MG: 800 INJECTION INTRAVENOUS at 03:01

## 2018-01-19 RX ADMIN — Medication: at 07:01

## 2018-01-19 RX ADMIN — METRONIDAZOLE 500 MG: 500 INJECTION, SOLUTION INTRAVENOUS at 04:01

## 2018-01-19 RX ADMIN — ACETAMINOPHEN 1000 MG: 10 INJECTION, SOLUTION INTRAVENOUS at 06:01

## 2018-01-19 RX ADMIN — SODIUM CHLORIDE, SODIUM LACTATE, POTASSIUM CHLORIDE, AND CALCIUM CHLORIDE: .6; .31; .03; .02 INJECTION, SOLUTION INTRAVENOUS at 08:01

## 2018-01-19 RX ADMIN — PROMETHAZINE HYDROCHLORIDE 6.25 MG: 25 INJECTION INTRAMUSCULAR; INTRAVENOUS at 02:01

## 2018-01-19 RX ADMIN — ASCORBIC ACID, VITAMIN A PALMITATE, CHOLECALCIFEROL, THIAMINE HYDROCHLORIDE, RIBOFLAVIN-5 PHOSPHATE SODIUM, PYRIDOXINE HYDROCHLORIDE, NIACINAMIDE, DEXPANTHENOL, ALPHA-TOCOPHEROL ACETATE, VITAMIN K1, FOLIC ACID, BIOTIN, CYANOCOBALAMIN: 200; 3300; 200; 6; 3.6; 6; 40; 15; 10; 150; 600; 60; 5 INJECTION, SOLUTION INTRAVENOUS at 08:01

## 2018-01-19 RX ADMIN — SODIUM CHLORIDE, SODIUM LACTATE, POTASSIUM CHLORIDE, AND CALCIUM CHLORIDE: .6; .31; .03; .02 INJECTION, SOLUTION INTRAVENOUS at 06:01

## 2018-01-19 RX ADMIN — Medication 10 ML: at 06:01

## 2018-01-19 RX ADMIN — FOLIC ACID 1 MG: 5 INJECTION, SOLUTION INTRAMUSCULAR; INTRAVENOUS; SUBCUTANEOUS at 08:01

## 2018-01-19 RX ADMIN — GABAPENTIN 100 MG: 100 CAPSULE ORAL at 10:01

## 2018-01-19 RX ADMIN — GABAPENTIN 100 MG: 100 CAPSULE ORAL at 06:01

## 2018-01-19 RX ADMIN — IBUPROFEN 800 MG: 800 INJECTION INTRAVENOUS at 10:01

## 2018-01-19 NOTE — PROGRESS NOTES
[facility]  Colorectal Surgery  Progress Note    Patient Name: Pedro Nelson  MRN: [id number]  Admission Date: 1/10/2018  Hospital Length of Stay: 8 days  Attending Physician: OKSANA Upton MD    Subjective:     Interval History:     Patient seen post-op. Complaining of pain at abdomen but no distress. No nausea or vomiting. No chest pain or SOB. Alert and able to converse appropriately.    Post-Op Info:  Procedure(s) (LRB):  EXPLORATORY-LAPAROTOMY (N/A)  CATHETERIZATION-URETHRAL (Right)  LYSIS-ADHESION EXTENSIVE- LASTING MORE THAN 2 HRS  REPAIR-FISTULA-ENTEROCUTANEOUS WITH RESECTION  ENTERECTOMY  WRAP-OMENTAL   Day of Surgery      Medications:  Continuous Infusions:   hydromorphone in 0.9 % NaCl 6 mg/30 ml      TPN ADULT CENTRAL LINE CUSTOM 75 mL/hr at 01/17/18 2316     Scheduled Meds:   acetaminophen  1,000 mg Intravenous Q8H    ciprofloxacin  400 mg Intravenous Q12H    cyanocobalamin  1,000 mcg Intramuscular Q30 Days    enoxaparin  40 mg Subcutaneous Daily    folic acid (FOLVITE) IVPB 1 mg  1 mg Intravenous Daily    gabapentin  100 mg Oral TID    ibuprofen  800 mg Intravenous Q8H    metronidazole  500 mg Intravenous Q8H    sodium chloride 0.9%  10 mL Intravenous Q6H     PRN Meds:   barium    barium    diphenhydrAMINE    naloxone    omnipaque    ondansetron    promethazine (PHENERGAN) IVPB    ramelteon    sodium chloride 0.9%    sodium chloride 0.9%        Objective:     Vital Signs (Most Recent):  Temp: 96.8 °F (36 °C) (01/18/18 2016)  Pulse: 79 (01/18/18 2016)  Resp: 14 (01/18/18 2016)  BP: 112/64 (01/18/18 2016)  SpO2: 99 % (01/18/18 2016) Vital Signs (24h Range):  Temp:  [96.8 °F (36 °C)-98 °F (36.7 °C)] 96.8 °F (36 °C)  Pulse:  [73-93] 79  Resp:  [14-20] 14  SpO2:  [99 %-100 %] 99 %  BP: ()/(54-79) 112/64     Intake/Output - Last 3 Shifts       01/16 0700 - 01/17 0659 01/17 0700 - 01/18 0659 01/18 0700 - 01/19 0659    P.O.       I.V. (mL/kg)   1900 (31.1)     Total Intake(mL/kg)   1900 (31.1)    Urine (mL/kg/hr)   800 (1)    Emesis/NG output       Drains   40 (0)    Other       Stool       Total Output     840    Net     +1060                 Physical Exam   Constitutional: He is oriented to person, place, and time. He appears well-developed. No distress.   HENT:   Head: Normocephalic and atraumatic.   Eyes: EOM are normal.   Neck: Normal range of motion.   Cardiovascular: Normal rate and intact distal pulses.    Pulmonary/Chest: Effort normal.   Abdominal: Soft. He exhibits no distension. There is tenderness. There is no rebound and no guarding.   Abdominal binder in place, SAMANTHA with serosang fluid   Musculoskeletal: Normal range of motion.   Neurological: He is alert and oriented to person, place, and time.   Skin: Skin is warm and dry.   Psychiatric: He has a normal mood and affect.     Significant Labs:  BMP (Last 3 Results):   Recent Labs  Lab 01/15/18  0539 01/16/18  0445 01/17/18  0537   * 90 106   * 133* 132*   K 4.4 4.4 4.3   CL 99 98 96   CO2 27 28 30*   BUN 16 14 14   CREATININE 0.8 0.9 0.8   CALCIUM 8.8 8.9 8.8     CBC (Last 3 Results):   Recent Labs  Lab 01/16/18  0445 01/17/18  0537 01/18/18  0619   WBC 8.23 7.12 7.94   RBC 3.83* 3.72* 3.63*   HGB 9.0* 8.5* 8.5*   HCT 29.5* 27.9* 27.3*    249 258   MCV 77* 75* 75*   MCH 23.5* 22.8* 23.4*   MCHC 30.5* 30.5* 31.1*     Assessment/Plan:     * Enterocutaneous fistula    -Allow CLD, continue TPN  -Dilaudid PCA for pain.  -OOB/IS.  -DVT ppx        Malnutrition    Cont TPN/diet              Gagandeep Gordillo MD  Colorectal Surgery  [facility]

## 2018-01-19 NOTE — SUBJECTIVE & OBJECTIVE
Subjective:     Interval History:     Patient seen post-op. Complaining of pain at abdomen but no distress. No nausea or vomiting. No chest pain or SOB. Alert and able to converse appropriately.    Post-Op Info:  Procedure(s) (LRB):  EXPLORATORY-LAPAROTOMY (N/A)  CATHETERIZATION-URETHRAL (Right)  LYSIS-ADHESION EXTENSIVE- LASTING MORE THAN 2 HRS  REPAIR-FISTULA-ENTEROCUTANEOUS WITH RESECTION  ENTERECTOMY  WRAP-OMENTAL   Day of Surgery      Medications:  Continuous Infusions:   hydromorphone in 0.9 % NaCl 6 mg/30 ml      TPN ADULT CENTRAL LINE CUSTOM 75 mL/hr at 01/17/18 2316     Scheduled Meds:   acetaminophen  1,000 mg Intravenous Q8H    ciprofloxacin  400 mg Intravenous Q12H    cyanocobalamin  1,000 mcg Intramuscular Q30 Days    enoxaparin  40 mg Subcutaneous Daily    folic acid (FOLVITE) IVPB 1 mg  1 mg Intravenous Daily    gabapentin  100 mg Oral TID    ibuprofen  800 mg Intravenous Q8H    metronidazole  500 mg Intravenous Q8H    sodium chloride 0.9%  10 mL Intravenous Q6H     PRN Meds:   barium    barium    diphenhydrAMINE    naloxone    omnipaque    ondansetron    promethazine (PHENERGAN) IVPB    ramelteon    sodium chloride 0.9%    sodium chloride 0.9%        Objective:     Vital Signs (Most Recent):  Temp: 96.8 °F (36 °C) (01/18/18 2016)  Pulse: 79 (01/18/18 2016)  Resp: 14 (01/18/18 2016)  BP: 112/64 (01/18/18 2016)  SpO2: 99 % (01/18/18 2016) Vital Signs (24h Range):  Temp:  [96.8 °F (36 °C)-98 °F (36.7 °C)] 96.8 °F (36 °C)  Pulse:  [73-93] 79  Resp:  [14-20] 14  SpO2:  [99 %-100 %] 99 %  BP: ()/(54-79) 112/64     Intake/Output - Last 3 Shifts       01/16 0700 - 01/17 0659 01/17 0700 - 01/18 0659 01/18 0700 - 01/19 0659    P.O.       I.V. (mL/kg)   1900 (31.1)    Total Intake(mL/kg)   1900 (31.1)    Urine (mL/kg/hr)   800 (1)    Emesis/NG output       Drains   40 (0)    Other       Stool       Total Output     840    Net     +1060                 Physical Exam   Constitutional:  He is oriented to person, place, and time. He appears well-developed. No distress.   HENT:   Head: Normocephalic and atraumatic.   Eyes: EOM are normal.   Neck: Normal range of motion.   Cardiovascular: Normal rate and intact distal pulses.    Pulmonary/Chest: Effort normal.   Abdominal: Soft. He exhibits no distension. There is tenderness. There is no rebound and no guarding.   Abdominal binder in place, SAMANTHA with serosang fluid   Musculoskeletal: Normal range of motion.   Neurological: He is alert and oriented to person, place, and time.   Skin: Skin is warm and dry.   Psychiatric: He has a normal mood and affect.     Significant Labs:  BMP (Last 3 Results):   Recent Labs  Lab 01/15/18  0539 01/16/18  0445 01/17/18  0537   * 90 106   * 133* 132*   K 4.4 4.4 4.3   CL 99 98 96   CO2 27 28 30*   BUN 16 14 14   CREATININE 0.8 0.9 0.8   CALCIUM 8.8 8.9 8.8     CBC (Last 3 Results):   Recent Labs  Lab 01/16/18  0445 01/17/18  0537 01/18/18  0619   WBC 8.23 7.12 7.94   RBC 3.83* 3.72* 3.63*   HGB 9.0* 8.5* 8.5*   HCT 29.5* 27.9* 27.3*    249 258   MCV 77* 75* 75*   MCH 23.5* 22.8* 23.4*   MCHC 30.5* 30.5* 31.1*

## 2018-01-19 NOTE — PROGRESS NOTES
[facility]  Adult Nutrition  Progress Note    SUMMARY     Recommendations  Recommendation/Intervention:  1. Current TPN exceeding EEN and EPN, recommend decreasing to Custom TPN 5% AA/20% Dextrose at 80 mL/hr + IV lipids daily - to provide 2190 kcal/day, 96 g protein/day, and 1920 mL fluid/day (GIR = 4.37 mg/kg/min).   2. As medically appropriate, ADAT to Low Fiber/Residue with texture per SLP.   RD to monitor.    Goals: Patient to meet > 85% EEN and EPN  Nutrition Goal Status: goal met  Communication of RD Recs: discussed on rounds    Reason for Assessment  Reason for Assessment: RD follow-up  Diagnosis:  (EC fistula)  Relevent Medical History: Crohn's disease   Interdisciplinary Rounds: attended  General Information Comments: POD#1 s/p ex lap, JS, and EC fistula repair. Patient NPO on TPN + lipids.  Nutrition Discharge Planning: Adequate nutrition via PO intake vs. TPN.    Nutrition Prescription Ordered  Current Diet Order: NPO  Current Nutrition Support Formula Ordered: Clinimix E 5/20 (+ lipids)  Current Nutrition Support Rate Ordered: 100 (ml)  Current Nutrition Support Frequency Ordered: mL/hr      Evaluation of Received Nutrients/Fluid Intake  Parenteral Calories (kcal): 2112  Parenteral Protein (gm): 120  Parenteral Fluid (mL): 2400  GIR (Glucose Infusion Rate) (mg/kg/min): 5.46 mg/kg/min  Lipid Calories (kcals): 500 kcals  Total Calories (kcal): 2612  Energy Calories Required: exceed needs  % Kcal Needs: 122  Protein Required: exceed needs  % Protein Needs: 130  I/O: +1.5L since admit  Fluid Required: exceed needs  Comments: LBM 1/9  Tolerance: tolerating  % Intake of Estimated Energy Needs: Other: > 100%  % Meal Intake: NPO     Nutrition Risk Screen   Nutrition Risk Screen: tube feeding or parenteral nutrition    Nutrition/Diet History  Typical Food/Fluid Intake: Patient on TPN PTA.  Food Preferences: No cultural/Druze needs identified at this time.  Factors Affecting Nutritional  "Intake: NPO, altered gastrointestinal function  Nutrition Support Formula Prior to Admit:  (Unsure of TPN formula)    Labs/Tests/Procedures/Meds   Pertinent Labs Reviewed: reviewed  Pertinent Labs Comments: Na 135, Alb 2.9  Pertinent Medications Reviewed: reviewed     Physical Findings  Overall Physical Appearance: underweight, generalized wasting  Tubes:  (none)  Oral/Mouth Cavity: WDL  Skin: non-healing wound(s) (fistula x3)    Anthropometrics  Height: 6' 3" (190.5 cm)  Weight Method: Bed Scale  Weight: 61.1 kg (134 lb 11.2 oz)  Ideal Body Weight (IBW), Male: 196 lb  % Ideal Body Weight, Male (lb): 68.72 lb  BMI (Calculated): 16.9  BMI Grade: 16 - 16.9 protein-energy malnutrition grade II  Usual Body Weight (UBW), k.2 kg (2017 per chart review)  % Usual Body Weight: 101.71  % Weight Change From Usual Weight: 1.49 %    Estimated/Assessed Needs  Weight Used For Calorie Calculations: 61.1 kg (134 lb 11.2 oz)   Energy Calorie Requirements (kcal): 6546-1309 kcal/day  Energy Need Method: Kcal/kg (30-35 kcal/kg)  RMR ([place]-St. Jeor Equation): 1606.62  Weight Used For Protein Calculations: 61.1 kg (134 lb 11.2 oz)  Protein Requirements: 80-92 g/day (1.2-1.4 g/kg)  Fluid Requirements (mL): 1 mL/kcal or per MD  Fluid Need Method: RDA Method  RDA Method (mL): 1833    Assessment and Plan  * Enterocutaneous fistula    Nutrition Problem  Altered GI Function    Related to (etiology):   Crohn's disease and EC fistulas    Signs and Symptoms (as evidenced by):   NPO and need for TPN    Nutrition Diagnosis Status:   Continues          Monitor and Evaluation  Food and Nutrient Intake: energy intake, parenteral nutrition intake  Food and Nutrient Adminstration: diet order, enteral and parenteral nutrition administration  Physical Activity and Function: nutrition-related ADLs and IADLs  Anthropometric Measurements: weight, weight change  Biochemical Data, Medical Tests and Procedures: electrolyte and renal panel, " gastrointestinal profile, glucose/endocrine profile, inflammatory profile, lipid profile  Nutrition-Focused Physical Findings: overall appearance, skin    Nutrition Risk  Level of Risk:  (1x/week)    Nutrition Follow-Up  RD Follow-up?: Yes

## 2018-01-19 NOTE — BRIEF OP NOTE
[facility]  Brief Operative Note    SUMMARY     Surgery Date: 1/18/2018     Surgeon(s) and Role:  Panel 1:     * OKSANA Upton MD - Primary  Gagandeep Gordillo MD    Panel 2:     * Mtat Toledo MD - Primary     * Naomi Crockett MD - Resident - Assisting     * Deon Glass MD - Resident - Assisting        Pre-op Diagnosis:  Enterocutaneous fistula [K63.2]  Crohn's disease of ileum with fistula [K50.013]    Post-op Diagnosis:  Post-Op Diagnosis Codes:     * Enterocutaneous fistula [K63.2]     * Crohn's disease of ileum with fistula [K50.013]    Procedure(s) (LRB):  EXPLORATORY-LAPAROTOMY (N/A)  CATHETERIZATION-URETHRAL (Right)  LYSIS-ADHESION EXTENSIVE- LASTING MORE THAN 2 HRS  REPAIR-FISTULA-ENTEROCUTANEOUS WITH RESECTION  ENTERECTOMY  WRAP-OMENTAL    Anesthesia: General    Description of Procedure: Ex lap, lysis of adhesions, small bowel resection, excision fistula tract    Description of the findings of the procedure: Portion of ileum near previous ileocolic anastomosis adhered to the abdominal wall and was source of fistula, which tracked from the RLQ to the midline and R flank. Chronic granulated tissue in fistula tracts.    Estimated Blood Loss: 100cc         Specimens:   Specimen (12h ago through future)    Start     Ordered    01/18/18 1202  Specimen to Pathology - Surgery  Once     Comments:  Specimen to Pathology: 1) fistula   - permanent , formalin , to refrigerator      01/18/18 1201    01/18/18 1054  Specimen to Pathology - Surgery  Once     Comments:  Specimen to Pathology: 1) portion of ileum - permanent , formalin , to refrigerator2) fistula tract - permanent , formalin , to refrigerator      01/18/18 1112

## 2018-01-19 NOTE — ASSESSMENT & PLAN NOTE
-Continue CLD, continue TPN  -Dilaudid PCA for pain.  -OOB/IS. PT to see.  -PADMAJA torres today.  -DVT ppx

## 2018-01-19 NOTE — PROGRESS NOTES
[facility]  Colorectal Surgery  Progress Note    Patient Name: Pedro Nelson  MRN: [id number]  Admission Date: 1/10/2018  Hospital Length of Stay: 9 days  Attending Physician: OKSANA Upton MD    Subjective:     Interval History:     NAEON. Pain controlled with PCA. Had some nausea initially but now resolved. Has not ambulated. Denies BM or flatus.    Post-Op Info:  Procedure(s) (LRB):  EXPLORATORY-LAPAROTOMY (N/A)  CATHETERIZATION-URETHRAL (Right)  LYSIS-ADHESION EXTENSIVE- LASTING MORE THAN 2 HRS  REPAIR-FISTULA-ENTEROCUTANEOUS WITH RESECTION  ENTERECTOMY  WRAP-OMENTAL   1 Day Post-Op      Medications:  Continuous Infusions:   Amino acid 5% - dextrose 20% (CLINIMIX-E) solution with additives (1L provides 50 gm AA, 200 gm CHO (680 kcal/L dextrose), Na 35, K 30, Mg 5, Ca 4.5, Acetate 80, Cl 39, Phos 15) 100 mL/hr at 01/19/18 0843    hydromorphone in 0.9 % NaCl 6 mg/30 ml       Scheduled Meds:   ciprofloxacin  400 mg Intravenous Q12H    cyanocobalamin  1,000 mcg Intramuscular Q30 Days    enoxaparin  40 mg Subcutaneous Daily    fat emulsion 20%  250 mL Intravenous Daily    folic acid (FOLVITE) IVPB 1 mg  1 mg Intravenous Daily    gabapentin  100 mg Oral TID    ibuprofen  800 mg Intravenous Q8H    metronidazole  500 mg Intravenous Q8H    sodium chloride 0.9%  10 mL Intravenous Q6H     PRN Meds:   barium    barium    diphenhydrAMINE    naloxone    omnipaque    ondansetron    promethazine (PHENERGAN) IVPB    ramelteon    sodium chloride 0.9%    sodium chloride 0.9%        Objective:     Vital Signs (Most Recent):  Temp: 96.2 °F (35.7 °C) (01/19/18 1214)  Pulse: 96 (01/19/18 1214)  Resp: 16 (01/19/18 1214)  BP: (!) 109/58 (01/19/18 1214)  SpO2: 100 % (01/19/18 1214) Vital Signs (24h Range):  Temp:  [96.2 °F (35.7 °C)-98 °F (36.7 °C)] 96.2 °F (35.7 °C)  Pulse:  [73-96] 96  Resp:  [14-20] 16  SpO2:  [98 %-100 %] 100 %  BP: (101-133)/(58-72) 109/58     Intake/Output - Last 3 Shifts        01/17 0700 - 01/18 0659 01/18 0700 - 01/19 0659 01/19 0700 - 01/20 0659    P.O.   120    I.V. (mL/kg)  1900 (31.1)     IV Piggyback   200    Total Intake(mL/kg)  1900 (31.1) 320 (5.2)    Urine (mL/kg/hr)  800 (0.5) 1100 (2.6)    Drains  40 (0)     Total Output   840 1100    Net   +1060 -780                 Physical Exam   Constitutional: He is oriented to person, place, and time. He appears well-developed. No distress.   HENT:   Head: Normocephalic and atraumatic.   Eyes: EOM are normal.   Neck: Normal range of motion.   Cardiovascular: Normal rate and intact distal pulses.    Pulmonary/Chest: Effort normal. No respiratory distress.   Abdominal: Soft. He exhibits no distension. There is tenderness. There is no rebound and no guarding.   Midline incision c/d/i, penrose in place in RLQ tracking to R flank, SAMANTHA drain with serosang fluid        Musculoskeletal: Normal range of motion.   Neurological: He is alert and oriented to person, place, and time.   Skin: Skin is warm and dry.   Psychiatric: He has a normal mood and affect.         Significant Labs:    Recent Labs  Lab 01/19/18  0638   WBC 13.97*   RBC 3.64*   HGB 8.5*   HCT 27.5*      MCV 76*   MCH 23.4*   MCHC 30.9*       Assessment/Plan:     * Enterocutaneous fistula    -Continue CLD, continue TPN  -Dilaudid PCA for pain.  -OOB/IS. PT to see.  -PADMAJA torres today.  -DVT ppx        Malnutrition    Cont TPN/diet              Gagandeep Gordillo MD  Colorectal Surgery  [facility]

## 2018-01-19 NOTE — PROGRESS NOTES
Assisted to transfer patient to new room .  Very guarded about allowing abdominal assessment due to pain.  Loosened abdominal binder to reveal midline incision c/d/i, penrose in place in RLQ tracking to R flank with bandage intact, SAMANTHA drain with serosangunious fluid. Very pleased he did not have to have a diverting ostomy.  Call paced to [facility] to discuss what they would like form wound care at this point. He refuses pouches to his right flank fistula . Michelle Ng NP states they want us to follow as we may be needed with his wound care and we already know and have worked with the patient.   Will keep him on our patient roster to follow.  Kelin Cadena RN CWON  [phone]

## 2018-01-19 NOTE — PROGRESS NOTES
Patient Consulted by CTTS:     The following was discussed by the Tobacco Treatment Specialist:  ? Relevance of Quitting  ? Risk to Health  ? Long Term Risk  ? Risk for Others  ? Rewards of Quitting  ? Motivation Intervention to Quit          Pt was given verbal information on the program. He stated that he's trying to quit. Pt is a resident of [place]. Will seek information on smoking cessation clinics there.

## 2018-01-19 NOTE — SUBJECTIVE & OBJECTIVE
Subjective:     Interval History:     NAEON. Pain controlled with PCA. Had some nausea initially but now resolved. Has not ambulated. Denies BM or flatus.    Post-Op Info:  Procedure(s) (LRB):  EXPLORATORY-LAPAROTOMY (N/A)  CATHETERIZATION-URETHRAL (Right)  LYSIS-ADHESION EXTENSIVE- LASTING MORE THAN 2 HRS  REPAIR-FISTULA-ENTEROCUTANEOUS WITH RESECTION  ENTERECTOMY  WRAP-OMENTAL   1 Day Post-Op      Medications:  Continuous Infusions:   Amino acid 5% - dextrose 20% (CLINIMIX-E) solution with additives (1L provides 50 gm AA, 200 gm CHO (680 kcal/L dextrose), Na 35, K 30, Mg 5, Ca 4.5, Acetate 80, Cl 39, Phos 15) 100 mL/hr at 01/19/18 0843    hydromorphone in 0.9 % NaCl 6 mg/30 ml       Scheduled Meds:   ciprofloxacin  400 mg Intravenous Q12H    cyanocobalamin  1,000 mcg Intramuscular Q30 Days    enoxaparin  40 mg Subcutaneous Daily    fat emulsion 20%  250 mL Intravenous Daily    folic acid (FOLVITE) IVPB 1 mg  1 mg Intravenous Daily    gabapentin  100 mg Oral TID    ibuprofen  800 mg Intravenous Q8H    metronidazole  500 mg Intravenous Q8H    sodium chloride 0.9%  10 mL Intravenous Q6H     PRN Meds:   barium    barium    diphenhydrAMINE    naloxone    omnipaque    ondansetron    promethazine (PHENERGAN) IVPB    ramelteon    sodium chloride 0.9%    sodium chloride 0.9%        Objective:     Vital Signs (Most Recent):  Temp: 96.2 °F (35.7 °C) (01/19/18 1214)  Pulse: 96 (01/19/18 1214)  Resp: 16 (01/19/18 1214)  BP: (!) 109/58 (01/19/18 1214)  SpO2: 100 % (01/19/18 1214) Vital Signs (24h Range):  Temp:  [96.2 °F (35.7 °C)-98 °F (36.7 °C)] 96.2 °F (35.7 °C)  Pulse:  [73-96] 96  Resp:  [14-20] 16  SpO2:  [98 %-100 %] 100 %  BP: (101-133)/(58-72) 109/58     Intake/Output - Last 3 Shifts       01/17 0700 - 01/18 0659 01/18 0700 - 01/19 0659 01/19 0700 - 01/20 0659    P.O.   120    I.V. (mL/kg)  1900 (31.1)     IV Piggyback   200    Total Intake(mL/kg)  1900 (31.1) 320 (5.2)    Urine (mL/kg/hr)  800  (0.5) 1100 (2.6)    Drains  40 (0)     Total Output   840 1100    Net   +1060 -780                 Physical Exam   Constitutional: He is oriented to person, place, and time. He appears well-developed. No distress.   HENT:   Head: Normocephalic and atraumatic.   Eyes: EOM are normal.   Neck: Normal range of motion.   Cardiovascular: Normal rate and intact distal pulses.    Pulmonary/Chest: Effort normal. No respiratory distress.   Abdominal: Soft. He exhibits no distension. There is tenderness. There is no rebound and no guarding.   Midline incision c/d/i, penrose in place in RLQ tracking to R flank, SAMANTHA drain with serosang fluid        Musculoskeletal: Normal range of motion.   Neurological: He is alert and oriented to person, place, and time.   Skin: Skin is warm and dry.   Psychiatric: He has a normal mood and affect.         Significant Labs:  {Select Labs:67068}    Significant Diagnostics:  {Imaging Review:[id number]}

## 2018-01-19 NOTE — ASSESSMENT & PLAN NOTE
Nutrition Problem  Altered GI Function    Related to (etiology):   Crohn's disease and EC fistulas    Signs and Symptoms (as evidenced by):   NPO and need for TPN    Nutrition Diagnosis Status:   Continues

## 2018-01-19 NOTE — PLAN OF CARE
GINO following for d/c needs.  SW waiting for Pt/OT recs for d/c planning.             Aron Gibbs, LMSW [facility]  [id number]

## 2018-01-20 LAB
ABO + RH BLD: NORMAL
ALBUMIN SERPL BCP-MCNC: 2 G/DL
ALP SERPL-CCNC: 73 U/L
ALT SERPL W/O P-5'-P-CCNC: 11 U/L
ANION GAP SERPL CALC-SCNC: 4 MMOL/L
AST SERPL-CCNC: 14 U/L
BASOPHILS # BLD AUTO: 0.01 K/UL
BASOPHILS # BLD AUTO: 0.01 K/UL
BASOPHILS NFR BLD: 0.1 %
BASOPHILS NFR BLD: 0.1 %
BILIRUB SERPL-MCNC: 0.4 MG/DL
BLD GP AB SCN CELLS X3 SERPL QL: NORMAL
BUN SERPL-MCNC: 14 MG/DL
CALCIUM SERPL-MCNC: 8 MG/DL
CHLORIDE SERPL-SCNC: 103 MMOL/L
CO2 SERPL-SCNC: 27 MMOL/L
CREAT SERPL-MCNC: 0.8 MG/DL
DIFFERENTIAL METHOD: ABNORMAL
DIFFERENTIAL METHOD: ABNORMAL
EOSINOPHIL # BLD AUTO: 0.2 K/UL
EOSINOPHIL # BLD AUTO: 0.3 K/UL
EOSINOPHIL NFR BLD: 2.8 %
EOSINOPHIL NFR BLD: 3.4 %
ERYTHROCYTE [DISTWIDTH] IN BLOOD BY AUTOMATED COUNT: 16.6 %
ERYTHROCYTE [DISTWIDTH] IN BLOOD BY AUTOMATED COUNT: 16.7 %
EST. GFR  (AFRICAN AMERICAN): >60 ML/MIN/1.73 M^2
EST. GFR  (NON AFRICAN AMERICAN): >60 ML/MIN/1.73 M^2
GLUCOSE SERPL-MCNC: 113 MG/DL
HCT VFR BLD AUTO: 22.7 %
HCT VFR BLD AUTO: 23.6 %
HGB BLD-MCNC: 6.9 G/DL
HGB BLD-MCNC: 7.1 G/DL
IMM GRANULOCYTES # BLD AUTO: 0.03 K/UL
IMM GRANULOCYTES # BLD AUTO: 0.05 K/UL
IMM GRANULOCYTES NFR BLD AUTO: 0.4 %
IMM GRANULOCYTES NFR BLD AUTO: 0.5 %
LYMPHOCYTES # BLD AUTO: 0.8 K/UL
LYMPHOCYTES # BLD AUTO: 1 K/UL
LYMPHOCYTES NFR BLD: 10.5 %
LYMPHOCYTES NFR BLD: 10.5 %
MCH RBC QN AUTO: 22.9 PG
MCH RBC QN AUTO: 22.9 PG
MCHC RBC AUTO-ENTMCNC: 30.1 G/DL
MCHC RBC AUTO-ENTMCNC: 30.4 G/DL
MCV RBC AUTO: 75 FL
MCV RBC AUTO: 76 FL
MONOCYTES # BLD AUTO: 0.8 K/UL
MONOCYTES # BLD AUTO: 1 K/UL
MONOCYTES NFR BLD: 10 %
MONOCYTES NFR BLD: 10.7 %
NEUTROPHILS # BLD AUTO: 5.3 K/UL
NEUTROPHILS # BLD AUTO: 7.4 K/UL
NEUTROPHILS NFR BLD: 74.9 %
NEUTROPHILS NFR BLD: 76.1 %
NRBC BLD-RTO: 0 /100 WBC
NRBC BLD-RTO: 0 /100 WBC
PHOSPHATE SERPL-MCNC: 2.7 MG/DL
PLATELET # BLD AUTO: 224 K/UL
PLATELET # BLD AUTO: 254 K/UL
PMV BLD AUTO: 9.4 FL
PMV BLD AUTO: 9.8 FL
POTASSIUM SERPL-SCNC: 4.2 MMOL/L
PROT SERPL-MCNC: 5.8 G/DL
RBC # BLD AUTO: 3.01 M/UL
RBC # BLD AUTO: 3.1 M/UL
SODIUM SERPL-SCNC: 134 MMOL/L
WBC # BLD AUTO: 7.13 K/UL
WBC # BLD AUTO: 9.72 K/UL

## 2018-01-20 PROCEDURE — A4216 STERILE WATER/SALINE, 10 ML: HCPCS | Performed by: COLON & RECTAL SURGERY

## 2018-01-20 PROCEDURE — B4185 PARENTERAL SOL 10 GM LIPIDS: HCPCS | Performed by: SURGERY

## 2018-01-20 PROCEDURE — G8979 MOBILITY GOAL STATUS: HCPCS | Mod: CJ

## 2018-01-20 PROCEDURE — 25000003 PHARM REV CODE 250: Performed by: COLON & RECTAL SURGERY

## 2018-01-20 PROCEDURE — 86850 RBC ANTIBODY SCREEN: CPT

## 2018-01-20 PROCEDURE — C9113 INJ PANTOPRAZOLE SODIUM, VIA: HCPCS | Performed by: SURGERY

## 2018-01-20 PROCEDURE — 84100 ASSAY OF PHOSPHORUS: CPT

## 2018-01-20 PROCEDURE — 63600175 PHARM REV CODE 636 W HCPCS: Performed by: COLON & RECTAL SURGERY

## 2018-01-20 PROCEDURE — 63600175 PHARM REV CODE 636 W HCPCS: Performed by: SURGERY

## 2018-01-20 PROCEDURE — G8978 MOBILITY CURRENT STATUS: HCPCS | Mod: CK

## 2018-01-20 PROCEDURE — 80053 COMPREHEN METABOLIC PANEL: CPT

## 2018-01-20 PROCEDURE — A4217 STERILE WATER/SALINE, 500 ML: HCPCS | Performed by: SURGERY

## 2018-01-20 PROCEDURE — S0030 INJECTION, METRONIDAZOLE: HCPCS | Performed by: COLON & RECTAL SURGERY

## 2018-01-20 PROCEDURE — 85025 COMPLETE CBC W/AUTO DIFF WBC: CPT

## 2018-01-20 PROCEDURE — 86920 COMPATIBILITY TEST SPIN: CPT

## 2018-01-20 PROCEDURE — 25000003 PHARM REV CODE 250: Performed by: SURGERY

## 2018-01-20 PROCEDURE — 11000001 HC ACUTE MED/SURG PRIVATE ROOM

## 2018-01-20 RX ORDER — DEXTROSE MONOHYDRATE, SODIUM CHLORIDE, AND POTASSIUM CHLORIDE 50; 1.49; 9 G/1000ML; G/1000ML; G/1000ML
INJECTION, SOLUTION INTRAVENOUS CONTINUOUS
Status: DISCONTINUED | OUTPATIENT
Start: 2018-01-20 | End: 2018-01-20

## 2018-01-20 RX ORDER — DEXTROSE MONOHYDRATE, SODIUM CHLORIDE, AND POTASSIUM CHLORIDE 50; 1.49; 9 G/1000ML; G/1000ML; G/1000ML
INJECTION, SOLUTION INTRAVENOUS CONTINUOUS
Status: DISPENSED | OUTPATIENT
Start: 2018-01-20 | End: 2018-01-20

## 2018-01-20 RX ORDER — PANTOPRAZOLE SODIUM 40 MG/10ML
40 INJECTION, POWDER, LYOPHILIZED, FOR SOLUTION INTRAVENOUS DAILY
Status: DISCONTINUED | OUTPATIENT
Start: 2018-01-20 | End: 2018-01-28 | Stop reason: HOSPADM

## 2018-01-20 RX ORDER — ENOXAPARIN SODIUM 100 MG/ML
40 INJECTION SUBCUTANEOUS EVERY 24 HOURS
Status: DISCONTINUED | OUTPATIENT
Start: 2018-01-21 | End: 2018-01-28 | Stop reason: HOSPADM

## 2018-01-20 RX ADMIN — GABAPENTIN 100 MG: 100 CAPSULE ORAL at 10:01

## 2018-01-20 RX ADMIN — GABAPENTIN 100 MG: 100 CAPSULE ORAL at 05:01

## 2018-01-20 RX ADMIN — PANTOPRAZOLE SODIUM 40 MG: 40 INJECTION, POWDER, FOR SOLUTION INTRAVENOUS at 02:01

## 2018-01-20 RX ADMIN — METRONIDAZOLE 500 MG: 500 INJECTION, SOLUTION INTRAVENOUS at 03:01

## 2018-01-20 RX ADMIN — CALCIUM GLUCONATE: 94 INJECTION, SOLUTION INTRAVENOUS at 10:01

## 2018-01-20 RX ADMIN — IBUPROFEN 400 MG: 800 INJECTION INTRAVENOUS at 10:01

## 2018-01-20 RX ADMIN — Medication: at 10:01

## 2018-01-20 RX ADMIN — FOLIC ACID 1 MG: 5 INJECTION, SOLUTION INTRAMUSCULAR; INTRAVENOUS; SUBCUTANEOUS at 08:01

## 2018-01-20 RX ADMIN — Medication: at 05:01

## 2018-01-20 RX ADMIN — Medication 10 ML: at 12:01

## 2018-01-20 RX ADMIN — Medication 10 ML: at 06:01

## 2018-01-20 RX ADMIN — CIPROFLOXACIN 400 MG: 2 INJECTION, SOLUTION INTRAVENOUS at 06:01

## 2018-01-20 RX ADMIN — METRONIDAZOLE 500 MG: 500 INJECTION, SOLUTION INTRAVENOUS at 08:01

## 2018-01-20 RX ADMIN — ALTEPLASE 2 MG: 2.2 INJECTION, POWDER, LYOPHILIZED, FOR SOLUTION INTRAVENOUS at 02:01

## 2018-01-20 RX ADMIN — METRONIDAZOLE 500 MG: 500 INJECTION, SOLUTION INTRAVENOUS at 12:01

## 2018-01-20 RX ADMIN — IBUPROFEN 800 MG: 800 INJECTION INTRAVENOUS at 05:01

## 2018-01-20 RX ADMIN — Medication 10 ML: at 02:01

## 2018-01-20 RX ADMIN — IBUPROFEN 800 MG: 800 INJECTION INTRAVENOUS at 01:01

## 2018-01-20 RX ADMIN — SOYBEAN OIL 250 ML: 20 INJECTION, SOLUTION INTRAVENOUS at 10:01

## 2018-01-20 RX ADMIN — Medication 10 ML: at 05:01

## 2018-01-20 RX ADMIN — DEXTROSE MONOHYDRATE, SODIUM CHLORIDE, AND POTASSIUM CHLORIDE: 50; 9; 1.49 INJECTION, SOLUTION INTRAVENOUS at 02:01

## 2018-01-20 RX ADMIN — GABAPENTIN 100 MG: 100 CAPSULE ORAL at 02:01

## 2018-01-20 NOTE — SUBJECTIVE & OBJECTIVE
Subjective:     Interval History: NAEON. Pain controlled with PCA. Nausea improved. Ambulating in hallway. Denies BM or flatus.    Post-Op Info:  Procedure(s) (LRB):  EXPLORATORY-LAPAROTOMY (N/A)  CATHETERIZATION-URETHRAL (Right)  LYSIS-ADHESION EXTENSIVE- LASTING MORE THAN 2 HRS  REPAIR-FISTULA-ENTEROCUTANEOUS WITH RESECTION  ENTERECTOMY  WRAP-OMENTAL   2 Days Post-Op      Medications:  Continuous Infusions:   hydromorphone in 0.9 % NaCl 6 mg/30 ml       Scheduled Meds:   ciprofloxacin  400 mg Intravenous Q12H    cyanocobalamin  1,000 mcg Intramuscular Q30 Days    enoxaparin  40 mg Subcutaneous Daily    fat emulsion 20%  250 mL Intravenous Daily    folic acid (FOLVITE) IVPB 1 mg  1 mg Intravenous Daily    gabapentin  100 mg Oral TID    ibuprofen  800 mg Intravenous Q8H    metronidazole  500 mg Intravenous Q8H    sodium chloride 0.9%  10 mL Intravenous Q6H     PRN Meds:   barium    barium    diphenhydrAMINE    naloxone    omnipaque    ondansetron    promethazine (PHENERGAN) IVPB    ramelteon    sodium chloride 0.9%    sodium chloride 0.9%        Objective:     Vital Signs (Most Recent):  Temp: 97.2 °F (36.2 °C) (01/20/18 0732)  Pulse: 90 (01/20/18 0732)  Resp: 18 (01/20/18 0732)  BP: (!) 116/59 (01/20/18 0732)  SpO2: 95 % (01/20/18 0732) Vital Signs (24h Range):  Temp:  [96.2 °F (35.7 °C)-98.3 °F (36.8 °C)] 97.2 °F (36.2 °C)  Pulse:  [89-96] 90  Resp:  [16-18] 18  SpO2:  [95 %-100 %] 95 %  BP: (108-116)/(54-59) 116/59     Intake/Output - Last 3 Shifts       01/18 0700 - 01/19 0659 01/19 0700 - 01/20 0659 01/20 0700 - 01/21 0659    P.O.  620     I.V. (mL/kg) 1900 (31.1)      IV Piggyback  400     TPN  1100     Total Intake(mL/kg) 1900 (31.1) 2120 (34.7)     Urine (mL/kg/hr) 800 (0.5) 3125 (2.1)     Drains 40 (0)  30 (0.1)    Stool   0 (0)    Total Output 840 3125 30    Net +1060 -1005 -30           Urine Occurrence  1 x     Stool Occurrence   0 x          Physical Exam   Constitutional: He is  oriented to person, place, and time. He appears well-developed. No distress.   HENT:   Head: Normocephalic and atraumatic.   Eyes: EOM are normal.   Neck: Normal range of motion.   Cardiovascular: Normal rate and intact distal pulses.    Pulmonary/Chest: Effort normal. No respiratory distress.   Abdominal: Soft. He exhibits no distension. There is tenderness. There is no rebound and no guarding.   Midline incision c/d/i, penrose in place in RLQ tracking to R flank, SAMANTHA drain with serosang fluid        Musculoskeletal: Normal range of motion.   Neurological: He is alert and oriented to person, place, and time.   Skin: Skin is warm and dry.   Psychiatric: He has a normal mood and affect.       Significant Labs:  BMP:   Recent Labs  Lab 01/18/18  0619  01/20/18  0321   GLU 88  < > 113*   *  < > 134*   K 4.2  < > 4.2     < > 103   CO2 25  < > 27   BUN 16  < > 14   CREATININE 0.8  < > 0.8   CALCIUM 8.8  < > 8.0*   MG 1.9  --   --    < > = values in this interval not displayed.  CBC:   Recent Labs  Lab 01/20/18  0321   WBC 9.72   RBC 3.10*   HGB 7.1*   HCT 23.6*      MCV 76*   MCH 22.9*   MCHC 30.1*     Microbiology Results (last 7 days)     ** No results found for the last 168 hours. **          Significant Diagnostics:  None

## 2018-01-20 NOTE — ASSESSMENT & PLAN NOTE
-Continue NPO with chips/sips, continue TPN  -Dilaudid PCA for pain.  -OOB/IS. PT to see.  -Ambulate in hallway TID  -DVT ppx  -RN to changes dressings BID

## 2018-01-20 NOTE — NURSING
Patient TPN order not renewed.  Paged MD on call 3x.  No return call as of yet.    Called pharmacy for replacement fluid til order is renewed.  Oncoming nurse aware.  Will continue to monitor.

## 2018-01-20 NOTE — PT/OT/SLP EVAL
Physical Therapy Evaluation    Patient Name:  Pedro Nelson   MRN:  [id number]    Recommendations:     Discharge Recommendations:  home   Discharge Equipment Recommendations: none   Barriers to discharge: None    Assessment:     Pedro Nelson is a 42 y.o. male admitted with a medical diagnosis of Enterocutaneous fistula.  He presents with the following impairments/functional limitations:  pain. Pt at overall SBA for transfers and mobility. Pt with reports of mild dizziness during evaluation which he attributes to medication. RN notified. Pt safe to ambulate with RN. PT to see patient 2x/wk to further assess balance and tolerance to activity. No recommendations needed. Pt able to return home with no DME needs. '    Rehab Prognosis:  Good; patient would benefit from acute skilled PT services to address these deficits and reach maximum level of function.      Recent Surgery: Procedure(s) (LRB):  EXPLORATORY-LAPAROTOMY (N/A)  CATHETERIZATION-URETHRAL (Right)  LYSIS-ADHESION EXTENSIVE- LASTING MORE THAN 2 HRS  REPAIR-FISTULA-ENTEROCUTANEOUS WITH RESECTION  ENTERECTOMY  WRAP-OMENTAL 2 Days Post-Op    Plan:     During this hospitalization, patient to be seen 2 x/week to address the above listed problems via gait training, therapeutic activities, therapeutic exercises  · Plan of Care Expires:  02/19/18   Plan of Care Reviewed with: patient    Subjective     Communicated with RN prior to session.  Patient found in bed upon PT entry to room, agreeable to evaluation.      Chief Complaint: pain  Patient comments/goals: get home  Pain/Comfort:  · Pain Rating 1: 6/10  · Location - Side 1: Bilateral  · Location - Orientation 1: generalized  · Location 1: abdomen  · Pain Addressed 1: Pre-medicate for activity, Cessation of Activity, Nurse notified  · Pain Rating Post-Intervention 1: 6/10    Patients cultural, spiritual, Religion conflicts given the current situation: none noted    Living Environment:  Pt reports living alone in  [place]. He reports having no concerns with home environment upon discharge.   Prior to admission, patients level of function was independent.  Patient has the following equipment: none.  DME owned (not currently used): none.  Upon discharge, patient will have assistance from family/friends.    Objective:     Patient found with: SCD, SAMANTHA drain, PCA, peripheral IV, telemetry     General Precautions: Standard, fall   Orthopedic Precautions:N/A   Braces: N/A     Exams:  · RLE Strength: WNL  · LLE Strength: WNL    Functional Mobility:  · Bed Mobility:     · Supine to Sit: stand by assistance  · Sit to Supine: stand by assistance  · Transfers:     · Sit to Stand:  stand by assistance with no AD  · Gait: Pt ambulated 300 feet with SBA and no AD. No LOB noted.     AM-PAC 6 CLICK MOBILITY  Total Score:18     Patient left HOB elevated with all lines intact, call button in reach and RN notified.    GOALS:    Physical Therapy Goals        Problem: Physical Therapy Goal    Goal Priority Disciplines Outcome Goal Variances Interventions   Physical Therapy Goal     PT/OT, PT      Description:  Goals to be met by: 2/3/2018     Patient will increase functional independence with mobility by performin. Supine to sit with Modified [place]  2. Sit to supine with Modified [place]  3. Sit to stand transfer with Supervision  4. Bed to chair transfer with Supervision.  5. Gait  x 200 feet with Supervision.  6. Lower extremity exercise program x 15 reps per handout, with supervision                      History:     Past Medical History:   Diagnosis Date    Crohn's disease        Past Surgical History:   Procedure Laterality Date    COLON SURGERY         Clinical Decision Making:     History  Co-morbidities and personal factors that may impact the plan of care Examination  Body Structures and Functions, activity limitations and participation restrictions that may impact the plan of care Clinical Presentation   Decision  Making/ Complexity Score   Co-morbidities:   [] Time since onset of injury / illness / exacerbation  [x] Status of current condition  []Patient's cognitive status and safety concerns    [] Multiple Medical Problems (see med hx)  Personal Factors:   [] Patient's age  [] Prior Level of function   [] Patient's home situation (environment and family support)  [] Patient's level of motivation  [] Expected progression of patient      HISTORY:(criteria)    [x] 37113 - no personal factors/history    [] 17185 - has 1-2 personal factor/comorbidity     [] 45208 - has >3 personal factor/comorbidity     Body Regions:  [] Objective examination findings  [] Head     []  Neck  [] Trunk   [] Upper Extremity  [x] Lower Extremity    Body Systems:  [] For communication ability, affect, cognition, language, and learning style: the assessment of the ability to make needs known, consciousness, orientation (person, place, and time), expected emotional /behavioral responses, and learning preferences (eg, learning barriers, education  needs)  [] For the neuromuscular system: a general assessment of gross coordinated movement (eg, balance, gait, locomotion, transfers, and transitions) and motor function  (motor control and motor learning)  [x] For the musculoskeletal system: the assessment of gross symmetry, gross range of motion, gross strength, height, and weight  [] For the integumentary system: the assessment of pliability(texture), presence of scar formation, skin color, and skin integrity  [] For cardiovascular/pulmonary system: the assessment of heart rate, respiratory rate, blood pressure, and edema     Activity limitations:    [] Patient's cognitive status and saf ety concerns          [] Status of current condition      [] Weight bearing restriction  [] Cardiopulmunary Restriction    Participation Restrictions:   [] Goals and goal agreement with the patient     [] Rehab potential (prognosis) and probable outcome      Examination of  Body System: (criteria)    [x] 19075 - addressing 1-2 elements    [] 72700 - addressing a total of 3 or more elements     [] 28090 -  Addressing a total of 4 or more elements         Clinical Presentation: (criteria)  Stable - 23530     On examination of body system using standardized tests and measures patient presents with 1-2 elements from any of the following: body structures and functions, activity limitations, and/or participation restrictions.  Leading to a clinical presentation that is considered stable and/or uncomplicated                              Clinical Decision Making  (Eval Complexity):  Low- 14244     Time Tracking:     PT Received On: 01/20/18  PT Start Time: 1128     PT Stop Time: 1143  PT Total Time (min): 15 min     Billable Minutes: Evaluation 1 PROCEDURE      Elsi Ashley, PT  01/20/2018

## 2018-01-21 LAB
ALBUMIN SERPL BCP-MCNC: 2 G/DL
ALP SERPL-CCNC: 68 U/L
ALT SERPL W/O P-5'-P-CCNC: 10 U/L
ANION GAP SERPL CALC-SCNC: 7 MMOL/L
AST SERPL-CCNC: 14 U/L
BASOPHILS # BLD AUTO: 0.01 K/UL
BASOPHILS NFR BLD: 0.2 %
BILIRUB SERPL-MCNC: 0.4 MG/DL
BLD PROD TYP BPU: NORMAL
BLD PROD TYP BPU: NORMAL
BLOOD UNIT EXPIRATION DATE: NORMAL
BLOOD UNIT EXPIRATION DATE: NORMAL
BLOOD UNIT TYPE CODE: 2800
BLOOD UNIT TYPE CODE: 9500
BLOOD UNIT TYPE: NORMAL
BLOOD UNIT TYPE: NORMAL
BUN SERPL-MCNC: 13 MG/DL
CALCIUM SERPL-MCNC: 8.3 MG/DL
CHLORIDE SERPL-SCNC: 106 MMOL/L
CO2 SERPL-SCNC: 24 MMOL/L
CODING SYSTEM: NORMAL
CODING SYSTEM: NORMAL
CREAT SERPL-MCNC: 1.1 MG/DL
DIFFERENTIAL METHOD: ABNORMAL
DISPENSE STATUS: NORMAL
DISPENSE STATUS: NORMAL
EOSINOPHIL # BLD AUTO: 0.3 K/UL
EOSINOPHIL NFR BLD: 4.3 %
ERYTHROCYTE [DISTWIDTH] IN BLOOD BY AUTOMATED COUNT: 16.7 %
EST. GFR  (AFRICAN AMERICAN): >60 ML/MIN/1.73 M^2
EST. GFR  (NON AFRICAN AMERICAN): >60 ML/MIN/1.73 M^2
GLUCOSE SERPL-MCNC: 111 MG/DL
HCT VFR BLD AUTO: 22.2 %
HGB BLD-MCNC: 6.7 G/DL
IMM GRANULOCYTES # BLD AUTO: 0.02 K/UL
IMM GRANULOCYTES NFR BLD AUTO: 0.3 %
LYMPHOCYTES # BLD AUTO: 0.8 K/UL
LYMPHOCYTES NFR BLD: 13.5 %
MCH RBC QN AUTO: 23.3 PG
MCHC RBC AUTO-ENTMCNC: 30.2 G/DL
MCV RBC AUTO: 77 FL
MONOCYTES # BLD AUTO: 0.7 K/UL
MONOCYTES NFR BLD: 11.2 %
NEUTROPHILS # BLD AUTO: 4.3 K/UL
NEUTROPHILS NFR BLD: 70.5 %
NRBC BLD-RTO: 0 /100 WBC
NUM UNITS TRANS PACKED RBC: NORMAL
PHOSPHATE SERPL-MCNC: 2.9 MG/DL
PLATELET # BLD AUTO: 212 K/UL
PMV BLD AUTO: 9.5 FL
POTASSIUM SERPL-SCNC: 4.2 MMOL/L
PROT SERPL-MCNC: 6 G/DL
RBC # BLD AUTO: 2.87 M/UL
SODIUM SERPL-SCNC: 137 MMOL/L
TRANS ERYTHROCYTES VOL PATIENT: NORMAL ML
WBC # BLD AUTO: 6.07 K/UL

## 2018-01-21 PROCEDURE — S0030 INJECTION, METRONIDAZOLE: HCPCS | Performed by: COLON & RECTAL SURGERY

## 2018-01-21 PROCEDURE — 25000003 PHARM REV CODE 250: Performed by: SURGERY

## 2018-01-21 PROCEDURE — 84100 ASSAY OF PHOSPHORUS: CPT

## 2018-01-21 PROCEDURE — 25000003 PHARM REV CODE 250: Performed by: COLON & RECTAL SURGERY

## 2018-01-21 PROCEDURE — 63600175 PHARM REV CODE 636 W HCPCS: Performed by: SURGERY

## 2018-01-21 PROCEDURE — 85025 COMPLETE CBC W/AUTO DIFF WBC: CPT

## 2018-01-21 PROCEDURE — C9113 INJ PANTOPRAZOLE SODIUM, VIA: HCPCS | Performed by: SURGERY

## 2018-01-21 PROCEDURE — 63600175 PHARM REV CODE 636 W HCPCS: Performed by: COLON & RECTAL SURGERY

## 2018-01-21 PROCEDURE — A4217 STERILE WATER/SALINE, 500 ML: HCPCS | Performed by: SURGERY

## 2018-01-21 PROCEDURE — 63600175 PHARM REV CODE 636 W HCPCS: Performed by: STUDENT IN AN ORGANIZED HEALTH CARE EDUCATION/TRAINING PROGRAM

## 2018-01-21 PROCEDURE — 80053 COMPREHEN METABOLIC PANEL: CPT

## 2018-01-21 PROCEDURE — B4185 PARENTERAL SOL 10 GM LIPIDS: HCPCS | Performed by: SURGERY

## 2018-01-21 PROCEDURE — 11000001 HC ACUTE MED/SURG PRIVATE ROOM

## 2018-01-21 PROCEDURE — A4216 STERILE WATER/SALINE, 10 ML: HCPCS | Performed by: COLON & RECTAL SURGERY

## 2018-01-21 RX ADMIN — PANTOPRAZOLE SODIUM 40 MG: 40 INJECTION, POWDER, FOR SOLUTION INTRAVENOUS at 08:01

## 2018-01-21 RX ADMIN — METRONIDAZOLE 500 MG: 500 INJECTION, SOLUTION INTRAVENOUS at 12:01

## 2018-01-21 RX ADMIN — ENOXAPARIN SODIUM 40 MG: 100 INJECTION SUBCUTANEOUS at 04:01

## 2018-01-21 RX ADMIN — ONDANSETRON 4 MG: 2 INJECTION INTRAMUSCULAR; INTRAVENOUS at 06:01

## 2018-01-21 RX ADMIN — IBUPROFEN 400 MG: 800 INJECTION INTRAVENOUS at 02:01

## 2018-01-21 RX ADMIN — CIPROFLOXACIN 400 MG: 2 INJECTION, SOLUTION INTRAVENOUS at 06:01

## 2018-01-21 RX ADMIN — METRONIDAZOLE 500 MG: 500 INJECTION, SOLUTION INTRAVENOUS at 04:01

## 2018-01-21 RX ADMIN — GABAPENTIN 100 MG: 100 CAPSULE ORAL at 06:01

## 2018-01-21 RX ADMIN — GABAPENTIN 100 MG: 100 CAPSULE ORAL at 02:01

## 2018-01-21 RX ADMIN — Medication 10 ML: at 02:01

## 2018-01-21 RX ADMIN — Medication: at 11:01

## 2018-01-21 RX ADMIN — GABAPENTIN 100 MG: 100 CAPSULE ORAL at 09:01

## 2018-01-21 RX ADMIN — Medication 10 ML: at 11:01

## 2018-01-21 RX ADMIN — CALCIUM GLUCONATE: 94 INJECTION, SOLUTION INTRAVENOUS at 09:01

## 2018-01-21 RX ADMIN — IBUPROFEN 400 MG: 800 INJECTION INTRAVENOUS at 11:01

## 2018-01-21 RX ADMIN — Medication: at 09:01

## 2018-01-21 RX ADMIN — Medication: at 04:01

## 2018-01-21 RX ADMIN — CIPROFLOXACIN 400 MG: 2 INJECTION, SOLUTION INTRAVENOUS at 05:01

## 2018-01-21 RX ADMIN — SOYBEAN OIL 250 ML: 20 INJECTION, SOLUTION INTRAVENOUS at 09:01

## 2018-01-21 RX ADMIN — Medication 10 ML: at 06:01

## 2018-01-21 RX ADMIN — Medication 10 ML: at 12:01

## 2018-01-21 RX ADMIN — METRONIDAZOLE 500 MG: 500 INJECTION, SOLUTION INTRAVENOUS at 08:01

## 2018-01-21 NOTE — OP NOTE
DATE OF PROCEDURE:  01/18/2018    INDICATIONS:  The patient is a 42-year-old male with a history of Crohn ileitis.    He had undergone resection in May 2017 for enterocutaneous fistulae secondary   to Crohn disease of the terminal ileum.  The patient underwent resection and   repair of the fistulas and initially did well.  After being discharged from the   hospital, the patient traveled back home to [place] where he developed abdominal   pain and recurrence of his fistulas with drainage from his fistula sites.  The   patient was managed medically and received biologic therapy for his Crohn   disease without affecting the fistula output.  He had drainage from three sites   including his midline wound, right lower quadrant and right flank area.  These   represented prior fistula sites.  He represented to the [facility]   01/10/2018 and at that time was admitted to the hospital for further evaluation   and management.  The patient received total parenteral nutrition as well as   antibiotics.  Workup included a CT scan, which did not reveal any evidence of an   abscess.  His subsequent enterography revealed a fistula most likely coming   from his previous anastomosis.  The patient was brought to the Operating Room   for definitive surgical treatment.  The expectation was that this would require   excision of his prior anastomosis and we planned on performing ileostomy at this   time.  I discussed this at length with the patient.  He understood the   objectives of the operation.    PREOPERATIVE DIAGNOSES:  Recurrent enterocutaneous fistulae (three), history of   Crohn ileitis.    POSTOPERATIVE DIAGNOSES:  Recurrent enterocutaneous fistulae (three), history of   Crohn ileitis, intact anastomosis with drainage from the divided end of ileum.    There was no evidence of intra-abdominal abscess.  There was no evidence of   recurrence of Crohn disease.    NAME OF PROCEDURES:  Exploratory laparotomy, extensive lysis of  "adhesions   lasting more than two hours, repair of enterocutaneous fistula with resection of   prior divided end of ileum at side-to-side anastomosis, pedicled omental wrap,   repair of enterocutaneous fistulae x3.    SURGEON:  OKSANA Upton M.D.    ASSISTANT SURGEON:  Gagandeep Gordillo M.D. (RES)    TYPE OF ANESTHESIA:  General endotracheal.    ESTIMATED BLOOD LOSS:  200 mL.    DRAINS:  None.    SPECIMENS:  Portion of ileum.  This represented the tip of the ileum involved in   the patient's prior ileocolonic anastomosis.  This was at the divided staple   line where the fistula emanated.  The anastomosis itself was healthy and there   was no evidence of recurrent Crohn disease.    FINDINGS:  1.  Multiple intra-abdominal adhesions necessitating extensive lysis of   adhesions lasting more than two hours.  2.  Multiple external openings of fistula emanating from one leak or one   intestinal source.  The source was the staple line at the side-to-side   anastomosis at the divided end of ileum.  There was a "diverticulum" at this   point, which could be resected.  The bowel itself was healthy and there was no   evidence of recurrent Crohn disease.  3.  The ileocolonic anastomosis was noted to be otherwise healthy without   evidence of recurrent Crohn disease.  A prior small bowel anastomosis also side   to side in configuration was noted to be healthy.  4.  During the course of lysis of adhesions, three serotomies were sustained and   required suture repair imbricating the defects.  There were no enterotomies   during the course of lysis of adhesions.  5.  The right lower quadrant fistula site communicated with the right flank   site.  There was noted an abdominal wall tract without evidence of pus.  6.  The cavity of this fistula site communicated intra-abdominally with an   opening along the right flank.  This opening was closed to prevent herniation of   the bowel.  The external fistulas in the right lower quadrant and " right flank   areas were drained with a 0.5-inch Penrose drain.  The third fistula site, which   was in the midline incision, was excised and essentially defect was   incorporated into the closure of the fascia.      TECHNIQUE IN DETAIL:  The patient was brought to the Operating Room, positively   identified and placed on the operating room table in the supine position with   sequential compression devices on his lower extremities.  The patient had   undergone an inpatient oral mechanical and oral antibiotic bowel preparation.    He received intravenous antibiotics.  He underwent general endotracheal   anesthesia without complication.  An orogastric tube was inserted.  He was   positioned in a modified lithotomy position and padded Yellofin stirrups.    Critical time-out was performed.  The Urology team at this point performed   cystoscopy with insertion of a right ureteral stent and also placed the Serrano   catheter.  This was secured.  The patient was then positioned in the supine   position with his left arm tucked at his side.  The patient's appliance was   removed and his abdomen was prepared and draped in the usual fashion.  The   patient was explored through a long midline incision, which extended from   essentially the xiphoid process to just above the symphysis pubis.  We   incorporated the external fistula site in the midline wound into the incision.    A prior scar was excised.  The abdomen was entered just below the xiphoid   process and in virginal territory.  The fascia was opened carefully in the   midline.  Adhesions were encountered and lysis of adhesions was carefully   performed using sharp scissor dissection.  The midline wound was opened   ultimately down to the level of the symphysis pubis.  In the region of the   fistula in the midline wound, there was no evidence of any significant adherent   bowel.  This tracked towards the right lower quadrant fistula in the   subcutaneous plane.    We then  performed complete lysis of adhesions first dissecting in the left upper   quadrant, left lower quadrant, dissecting the small bowel away from the colon   and transverse colon.  The ligament of Treitz was identified.  We identified the   base of the small bowel mesentery.  We dissected the bowel out of the pelvis as   well.  We then carefully dissected the bowel along the right lower quadrant up   to the level of the fistula site to the anterior abdominal wall, which was noted   to be lateral.  This was left in place.  I dissected the small bowel out of the   right upper quadrant from underneath the transverse colon identifying the prior   anastomosis.  The anastomosis was noted to be densely adherent to the right   side of the abdominal wall and this was consistent with the expectation that the   fistula source was the prior anastomosis.  However, the side-to-side   anastomosis itself appeared healthy and we could see clearly the vertical staple   line of the side-to-side anastomosis and it was noted to be unremarkable.  We   then performed careful pinch and sharp dissection of the small bowel away from   the anterior abdominal wall.  There was noted to be a fistula emanating from the   tip of the divided ileum, which was separate from the actual vertical staple   line of the side-to-side anastomosis.  The bowel itself appeared healthy and   this was right at the staple line.  There was approximately 2 to 2.5 cm of this   end of ileum.  We transected this using a LOW 75 mm stapling device.  The   specimen was handed off the operative field.  We carefully inspected the   anastomosis, otherwise and it was noted to be healthy and there was no evidence   of recurrent Crohn disease.  The mesentery was noted to be soft and   unremarkable.  We then dissected the remaining small bowel off of the right   retroperitoneum and right side of the abdominal wall delivering the bowel into   the abdomen.  There was no pus present.   There was no stool in the abdomen.    There was no evidence of any contamination of the abdominal wall cavity.  We   carefully irrigated the right lower quadrant identifying the opening into the   anterior abdominal wall as the fistula site coursing to the right lower quadrant   as well as to the right flank.  We placed sponges over this.  We then performed   complete lysis of adhesions of all loops of small bowel.  There was noted to be   a segment of small bowel, which was adherent to the fistula site of the   anterior abdominal wall, but there was no actual fistula to the bowel.  This was   just a dense inflammatory adhesion.  We ran the small bowel from the ligament   of Treitz to the anastomosis of the small bowel, which was noted to be healthy.    This was at approximately 300 cm.  The small bowel was then run to the distal   ileocolonic anastomosis.  There were no areas suspicious for Crohn disease.    Three serotomies were repaired using Lembert sutures of 3-0 Vicryl.  We   imbricated the divided end of ileum, the fresh staple line, with imbricating   sutures of 3-0 Vicryl.  The abdomen was then irrigated with saline solution.    The fistula sites were carefully irrigated with the Pulsavac device with 3 L of   antibiotic saline solution, bacitracin.  We closed the abdominal wall defect in   the right side of the abdominal cavity using interrupted 0 Vicryl suture for   fear of herniation into this defect.  We then harvested a pedicled omental graft   and covered the ileocolonic anastomosis and the mesenteric defect using   interrupted 3-0 Vicryl suture.  The small bowel was returned to its anatomic   position.  We injected Exparel.  We then used a separate closing tray to close   the abdomen.  We changed gown and gloves.  We placed Seprafilm over the contents   of the peritoneal cavity.  We then mobilized the subcutaneous tissues off of   the anterior fascia to assist in closure of the midline wound.  The  previous   fistula site in the midline wound was excised and sent to Surgical Pathology.    We then closed the fascia using continuous loop #1 PDS suture.  The subcutaneous   tissues were irrigated with saline solution.  We placed a flat Erwin-Bermudez   drain into the subcutaneous tissues.  We used the Pulsavac to irrigate the   subcutaneous tissues.  Hemostasis was assured.  The drain was secured to the   skin in a stab incision in the left lower quadrant using 2-0 nylon.  The   umbilicus was reapproximated to the fascia using 3-0 Vicryl.  The skin was   reapproximated using subcuticular 4-0 Monocryl and skin glue.    We then debrided the fistula tracts in the right lower quadrant using a curette.    We then turned the patient up onto his left side, so we could gain access to   the right flank wound, which was where the skin edges were freshened and the   wound debrided with a curette.  Pulsavac was used to irrigate the right flank   wound and the right lower quadrant wound.  We placed a 0.5-inch Penrose drain   through the defect and it was sutured to the skin using 2-0 nylon.  The wounds   were covered with dry dressings.  These were taped in place.  The patient was   then awakened from anesthesia, extubated without incident and returned to the   Recovery Area in stable condition.      ALINE/HN  dd: 01/20/2018 18:22:20 (CST)  td: 01/20/2018 20:28:18 (CST)  Doc ID   #[id number]  Job ID #[id number]    CC:

## 2018-01-21 NOTE — PROGRESS NOTES
[facility]  Colorectal Surgery  Progress Note    Patient Name: Pedro Nelson  MRN: [id number]  Admission Date: 1/10/2018  Hospital Length of Stay: 11 days  Attending Physician: OKSANA Upton MD    Subjective:     Interval History: No acute events. Pain well-controlled. No N/V. Ambulating in hallway.     Post-Op Info:  Procedure(s) (LRB):  EXPLORATORY-LAPAROTOMY (N/A)  CATHETERIZATION-URETHRAL (Right)  LYSIS-ADHESION EXTENSIVE- LASTING MORE THAN 2 HRS  REPAIR-FISTULA-ENTEROCUTANEOUS WITH RESECTION  ENTERECTOMY  WRAP-OMENTAL   3 Days Post-Op      Medications:  Continuous Infusions:   hydromorphone in 0.9 % NaCl 6 mg/30 ml      TPN ADULT CENTRAL LINE CUSTOM 60 mL/hr at 01/20/18 2245     Scheduled Meds:   alteplase  2 mg Intra-Catheter Weekly    ciprofloxacin  400 mg Intravenous Q12H    cyanocobalamin  1,000 mcg Intramuscular Q30 Days    enoxaparin  40 mg Subcutaneous Daily    gabapentin  100 mg Oral TID    ibuprofen  400 mg Intravenous Q8H    metronidazole  500 mg Intravenous Q8H    pantoprazole  40 mg Intravenous Daily    sodium chloride 0.9%  10 mL Intravenous Q6H     PRN Meds:   alteplase    barium    barium    diphenhydrAMINE    naloxone    omnipaque    ondansetron    promethazine (PHENERGAN) IVPB    ramelteon    sodium chloride 0.9%        Objective:     Vital Signs (Most Recent):  Temp: 97.4 °F (36.3 °C) (01/21/18 1146)  Pulse: 96 (01/21/18 1146)  Resp: 20 (01/21/18 1146)  BP: 120/62 (01/21/18 1146)  SpO2: 98 % (01/21/18 1146) Vital Signs (24h Range):  Temp:  [97.2 °F (36.2 °C)-98.6 °F (37 °C)] 97.4 °F (36.3 °C)  Pulse:  [92-97] 96  Resp:  [15-20] 20  SpO2:  [96 %-98 %] 98 %  BP: (108-128)/(55-62) 120/62     Intake/Output - Last 3 Shifts       01/19 0700 - 01/20 0659 01/20 0700 - 01/21 0659 01/21 0700 - 01/22 0659    P.O. 620 100 0    I.V. (mL/kg)       IV Piggyback 400      TPN 1100      Total Intake(mL/kg) 2120 (34.7) 100 (1.6) 0 (0)    Urine (mL/kg/hr) 3125 (2.1) 1600  (1.1) 900 (2.6)    Emesis/NG output  0 (0) 0 (0)    Drains  40 (0) 5 (0)    Other  0 (0)     Stool  0 (0) 0 (0)    Blood  0 (0) 0 (0)    Total Output 3125 1640 905    [facility]1005 -1540 -905           Urine Occurrence 1 x      Stool Occurrence  0 x 0 x          Physical Exam   Constitutional: He is oriented to person, place, and time. He appears well-developed. No distress.   HENT:   Head: Normocephalic and atraumatic.   Eyes: EOM are normal.   Neck: Normal range of motion.   Cardiovascular: Normal rate and intact distal pulses.    Pulmonary/Chest: Effort normal. No respiratory distress.   Abdominal: Soft. He exhibits no distension. There is tenderness. There is no rebound and no guarding.   Midline incision c/d/i, penrose in place in RLQ tracking to R flank, SAMANTHA drain with serosang fluid        Musculoskeletal: Normal range of motion.   Neurological: He is alert and oriented to person, place, and time.   Skin: Skin is warm and dry.   Psychiatric: He has a normal mood and affect.     Significant Labs:  BMP (Last 3 Results):   Recent Labs  Lab 01/18/18  0619 01/19/18  0638 01/20/18  0321 01/21/18  0250   GLU 88 90 113* 111*   * 129* 134* 137   K 4.2 4.2 4.2 4.2    99 103 106   CO2 25 26 27 24   BUN 16 18 14 13   CREATININE 0.8 0.9 0.8 1.1   CALCIUM 8.8 7.9* 8.0* 8.3*   MG 1.9  --   --   --      CBC (Last 3 Results):   Recent Labs  Lab 01/20/18  0321 01/20/18  1718 01/21/18  0250   WBC 9.72 7.13 6.07   RBC 3.10* 3.01* 2.87*   HGB 7.1* 6.9* 6.7*   HCT 23.6* 22.7* 22.2*    224 212   MCV 76* 75* 77*   MCH 22.9* 22.9* 23.3*   MCHC 30.1* 30.4* 30.2*       Significant Diagnostics:  None    Assessment/Plan:     * Enterocutaneous fistula    -Continue NPO with chips/sips, continue TPN  -Dilaudid PCA for pain.  -OOB/IS. PT to see.  -Ambulate in hallway TID  -DVT ppx  -RN to changes dressings BID        Malnutrition    Cont TPN              Manny Richard MD  Colorectal Surgery  [facility]

## 2018-01-21 NOTE — PLAN OF CARE
Problem: Patient Care Overview  Goal: Plan of Care Review  Outcome: Ongoing (interventions implemented as appropriate)  Patient AAOx4. Pain controlled by PCA. VS stable, afrebrile. Neurovascular intact. Skin intact, with exception of incisions. Free from falls. Frequent rounds made for safety, pain and comfort. Bed at lowest position, call light within reach, side rails up x2.

## 2018-01-22 LAB
ALBUMIN SERPL BCP-MCNC: 2.1 G/DL
ALP SERPL-CCNC: 75 U/L
ALT SERPL W/O P-5'-P-CCNC: 11 U/L
ANION GAP SERPL CALC-SCNC: 7 MMOL/L
AST SERPL-CCNC: 16 U/L
BASOPHILS # BLD AUTO: 0.01 K/UL
BASOPHILS NFR BLD: 0.2 %
BILIRUB SERPL-MCNC: 0.3 MG/DL
BLD PROD TYP BPU: NORMAL
BLOOD UNIT EXPIRATION DATE: NORMAL
BLOOD UNIT TYPE CODE: 2800
BLOOD UNIT TYPE: NORMAL
BUN SERPL-MCNC: 20 MG/DL
CALCIUM SERPL-MCNC: 8.5 MG/DL
CHLORIDE SERPL-SCNC: 104 MMOL/L
CO2 SERPL-SCNC: 26 MMOL/L
CODING SYSTEM: NORMAL
CREAT SERPL-MCNC: 1.1 MG/DL
CRP SERPL-MCNC: 72.7 MG/L
DIFFERENTIAL METHOD: ABNORMAL
DISPENSE STATUS: NORMAL
EOSINOPHIL # BLD AUTO: 0.2 K/UL
EOSINOPHIL NFR BLD: 3.9 %
ERYTHROCYTE [DISTWIDTH] IN BLOOD BY AUTOMATED COUNT: 16.9 %
EST. GFR  (AFRICAN AMERICAN): >60 ML/MIN/1.73 M^2
EST. GFR  (NON AFRICAN AMERICAN): >60 ML/MIN/1.73 M^2
GLUCOSE SERPL-MCNC: 98 MG/DL
HCT VFR BLD AUTO: 22 %
HGB BLD-MCNC: 6.8 G/DL
IMM GRANULOCYTES # BLD AUTO: 0.02 K/UL
IMM GRANULOCYTES NFR BLD AUTO: 0.3 %
LYMPHOCYTES # BLD AUTO: 1 K/UL
LYMPHOCYTES NFR BLD: 15.7 %
MAGNESIUM SERPL-MCNC: 2 MG/DL
MCH RBC QN AUTO: 23.4 PG
MCHC RBC AUTO-ENTMCNC: 30.9 G/DL
MCV RBC AUTO: 76 FL
MONOCYTES # BLD AUTO: 0.6 K/UL
MONOCYTES NFR BLD: 10.2 %
NEUTROPHILS # BLD AUTO: 4.3 K/UL
NEUTROPHILS NFR BLD: 69.7 %
NRBC BLD-RTO: 0 /100 WBC
NUM UNITS TRANS PACKED RBC: NORMAL
PHOSPHATE SERPL-MCNC: 3.8 MG/DL
PLATELET # BLD AUTO: 251 K/UL
PMV BLD AUTO: 9.7 FL
POTASSIUM SERPL-SCNC: 4.2 MMOL/L
PREALB SERPL-MCNC: 11 MG/DL
PROT SERPL-MCNC: 6.5 G/DL
RBC # BLD AUTO: 2.9 M/UL
SODIUM SERPL-SCNC: 137 MMOL/L
WBC # BLD AUTO: 6.18 K/UL

## 2018-01-22 PROCEDURE — 25000003 PHARM REV CODE 250: Performed by: COLON & RECTAL SURGERY

## 2018-01-22 PROCEDURE — 63600175 PHARM REV CODE 636 W HCPCS: Performed by: COLON & RECTAL SURGERY

## 2018-01-22 PROCEDURE — A4216 STERILE WATER/SALINE, 10 ML: HCPCS | Performed by: COLON & RECTAL SURGERY

## 2018-01-22 PROCEDURE — 25000003 PHARM REV CODE 250: Performed by: SURGERY

## 2018-01-22 PROCEDURE — 63600175 PHARM REV CODE 636 W HCPCS: Performed by: STUDENT IN AN ORGANIZED HEALTH CARE EDUCATION/TRAINING PROGRAM

## 2018-01-22 PROCEDURE — 25000003 PHARM REV CODE 250: Performed by: NURSE PRACTITIONER

## 2018-01-22 PROCEDURE — 84134 ASSAY OF PREALBUMIN: CPT

## 2018-01-22 PROCEDURE — C9113 INJ PANTOPRAZOLE SODIUM, VIA: HCPCS | Performed by: SURGERY

## 2018-01-22 PROCEDURE — 83735 ASSAY OF MAGNESIUM: CPT

## 2018-01-22 PROCEDURE — S0030 INJECTION, METRONIDAZOLE: HCPCS | Performed by: COLON & RECTAL SURGERY

## 2018-01-22 PROCEDURE — B4185 PARENTERAL SOL 10 GM LIPIDS: HCPCS | Performed by: NURSE PRACTITIONER

## 2018-01-22 PROCEDURE — 80053 COMPREHEN METABOLIC PANEL: CPT

## 2018-01-22 PROCEDURE — P9016 RBC LEUKOCYTES REDUCED: HCPCS

## 2018-01-22 PROCEDURE — 36430 TRANSFUSION BLD/BLD COMPNT: CPT

## 2018-01-22 PROCEDURE — 99900035 HC TECH TIME PER 15 MIN (STAT)

## 2018-01-22 PROCEDURE — 84100 ASSAY OF PHOSPHORUS: CPT

## 2018-01-22 PROCEDURE — A4217 STERILE WATER/SALINE, 500 ML: HCPCS | Performed by: NURSE PRACTITIONER

## 2018-01-22 PROCEDURE — 86140 C-REACTIVE PROTEIN: CPT

## 2018-01-22 PROCEDURE — 11000001 HC ACUTE MED/SURG PRIVATE ROOM

## 2018-01-22 PROCEDURE — 63600175 PHARM REV CODE 636 W HCPCS: Performed by: NURSE PRACTITIONER

## 2018-01-22 PROCEDURE — 63600175 PHARM REV CODE 636 W HCPCS: Performed by: SURGERY

## 2018-01-22 PROCEDURE — 85025 COMPLETE CBC W/AUTO DIFF WBC: CPT

## 2018-01-22 RX ORDER — HYDROCODONE BITARTRATE AND ACETAMINOPHEN 500; 5 MG/1; MG/1
TABLET ORAL
Status: DISCONTINUED | OUTPATIENT
Start: 2018-01-22 | End: 2018-01-28 | Stop reason: HOSPADM

## 2018-01-22 RX ORDER — FENTANYL CITRATE 50 UG/ML
25 INJECTION, SOLUTION INTRAMUSCULAR; INTRAVENOUS EVERY 4 HOURS PRN
Status: DISCONTINUED | OUTPATIENT
Start: 2018-01-22 | End: 2018-01-22

## 2018-01-22 RX ORDER — OXYCODONE HYDROCHLORIDE 5 MG/1
10 TABLET ORAL EVERY 4 HOURS PRN
Status: DISCONTINUED | OUTPATIENT
Start: 2018-01-22 | End: 2018-01-25

## 2018-01-22 RX ORDER — OXYCODONE HYDROCHLORIDE 5 MG/1
10 TABLET ORAL EVERY 6 HOURS PRN
Status: DISCONTINUED | OUTPATIENT
Start: 2018-01-22 | End: 2018-01-22

## 2018-01-22 RX ORDER — MEPERIDINE HYDROCHLORIDE 50 MG/ML
50 INJECTION INTRAMUSCULAR; INTRAVENOUS; SUBCUTANEOUS EVERY 4 HOURS PRN
Status: DISPENSED | OUTPATIENT
Start: 2018-01-22 | End: 2018-01-24

## 2018-01-22 RX ADMIN — IBUPROFEN 400 MG: 800 INJECTION INTRAVENOUS at 09:01

## 2018-01-22 RX ADMIN — SOYBEAN OIL 250 ML: 20 INJECTION, SOLUTION INTRAVENOUS at 09:01

## 2018-01-22 RX ADMIN — METRONIDAZOLE 500 MG: 500 INJECTION, SOLUTION INTRAVENOUS at 05:01

## 2018-01-22 RX ADMIN — METRONIDAZOLE 500 MG: 500 INJECTION, SOLUTION INTRAVENOUS at 08:01

## 2018-01-22 RX ADMIN — GABAPENTIN 100 MG: 100 CAPSULE ORAL at 05:01

## 2018-01-22 RX ADMIN — Medication 10 ML: at 11:01

## 2018-01-22 RX ADMIN — CIPROFLOXACIN 400 MG: 2 INJECTION, SOLUTION INTRAVENOUS at 08:01

## 2018-01-22 RX ADMIN — Medication: at 06:01

## 2018-01-22 RX ADMIN — IBUPROFEN 400 MG: 800 INJECTION INTRAVENOUS at 06:01

## 2018-01-22 RX ADMIN — OXYCODONE HYDROCHLORIDE 10 MG: 5 TABLET ORAL at 09:01

## 2018-01-22 RX ADMIN — Medication 10 ML: at 05:01

## 2018-01-22 RX ADMIN — ENOXAPARIN SODIUM 40 MG: 100 INJECTION SUBCUTANEOUS at 05:01

## 2018-01-22 RX ADMIN — OXYCODONE HYDROCHLORIDE 10 MG: 5 TABLET ORAL at 12:01

## 2018-01-22 RX ADMIN — METRONIDAZOLE 500 MG: 500 INJECTION, SOLUTION INTRAVENOUS at 12:01

## 2018-01-22 RX ADMIN — ONDANSETRON 4 MG: 2 INJECTION INTRAMUSCULAR; INTRAVENOUS at 09:01

## 2018-01-22 RX ADMIN — ONDANSETRON 4 MG: 2 INJECTION INTRAMUSCULAR; INTRAVENOUS at 03:01

## 2018-01-22 RX ADMIN — MEPERIDINE HYDROCHLORIDE 50 MG: 50 INJECTION INTRAMUSCULAR; INTRAVENOUS; SUBCUTANEOUS at 03:01

## 2018-01-22 RX ADMIN — PANTOPRAZOLE SODIUM 40 MG: 40 INJECTION, POWDER, FOR SOLUTION INTRAVENOUS at 08:01

## 2018-01-22 RX ADMIN — Medication 10 ML: at 06:01

## 2018-01-22 RX ADMIN — GABAPENTIN 100 MG: 100 CAPSULE ORAL at 09:01

## 2018-01-22 RX ADMIN — OXYCODONE HYDROCHLORIDE 10 MG: 5 TABLET ORAL at 05:01

## 2018-01-22 RX ADMIN — Medication 10 ML: at 12:01

## 2018-01-22 RX ADMIN — CALCIUM GLUCONATE: 94 INJECTION, SOLUTION INTRAVENOUS at 09:01

## 2018-01-22 RX ADMIN — IBUPROFEN 400 MG: 800 INJECTION INTRAVENOUS at 02:01

## 2018-01-22 RX ADMIN — MEPERIDINE HYDROCHLORIDE 50 MG: 50 INJECTION INTRAMUSCULAR; INTRAVENOUS; SUBCUTANEOUS at 08:01

## 2018-01-22 RX ADMIN — GABAPENTIN 100 MG: 100 CAPSULE ORAL at 02:01

## 2018-01-22 NOTE — SUBJECTIVE & OBJECTIVE
Subjective:     Interval History:     BECCA. Had some nausea yesterday but has since resolved. Ambulating. No bowel function yet. Pain controlled.    Post-Op Info:  Procedure(s) (LRB):  EXPLORATORY-LAPAROTOMY (N/A)  CATHETERIZATION-URETHRAL (Right)  LYSIS-ADHESION EXTENSIVE- LASTING MORE THAN 2 HRS  REPAIR-FISTULA-ENTEROCUTANEOUS WITH RESECTION  ENTERECTOMY  WRAP-OMENTAL   4 Days Post-Op      Medications:  Continuous Infusions:   hydromorphone in 0.9 % NaCl 6 mg/30 ml      TPN ADULT CENTRAL LINE CUSTOM 60 mL/hr at 01/21/18 2118    TPN ADULT CENTRAL LINE CUSTOM       Scheduled Meds:   alteplase  2 mg Intra-Catheter Weekly    ciprofloxacin  400 mg Intravenous Q12H    cyanocobalamin  1,000 mcg Intramuscular Q30 Days    enoxaparin  40 mg Subcutaneous Daily    fat emulsion 20%  250 mL Intravenous Daily    gabapentin  100 mg Oral TID    ibuprofen  400 mg Intravenous Q8H    metronidazole  500 mg Intravenous Q8H    pantoprazole  40 mg Intravenous Daily    sodium chloride 0.9%  10 mL Intravenous Q6H     PRN Meds:   sodium chloride    alteplase    barium    barium    diphenhydrAMINE    naloxone    omnipaque    ondansetron    promethazine (PHENERGAN) IVPB    ramelteon    sodium chloride 0.9%        Objective:     Vital Signs (Most Recent):  Temp: 97.3 °F (36.3 °C) (01/22/18 1105)  Pulse: 73 (01/22/18 1105)  Resp: 16 (01/22/18 1105)  BP: 108/68 (01/22/18 1105)  SpO2: 99 % (01/22/18 1105) Vital Signs (24h Range):  Temp:  [97.2 °F (36.2 °C)-98.1 °F (36.7 °C)] 97.3 °F (36.3 °C)  Pulse:  [73-96] 73  Resp:  [16-20] 16  SpO2:  [97 %-99 %] 99 %  BP: (102-120)/(56-68) 108/68     Intake/Output - Last 3 Shifts       01/20 0700 - 01/21 0659 01/21 0700 - 01/22 0659 01/22 0700 - 01/23 0659    P.O. 100 0     IV Piggyback   200    TPN       Total Intake(mL/kg) 100 (1.6) 0 (0) 200 (3.3)    Urine (mL/kg/hr) 1600 (1.1) 900 (0.6) 750 (2.7)    Emesis/NG output 0 (0) 0 (0)     Drains 40 (0) 5 (0)     Other 0 (0)      Stool  0 (0) 0 (0)     Blood 0 (0) 0 (0)     Total Output 1640 905 261    Net -1540 -905 -550           Stool Occurrence 0 x 0 x           Physical Exam   Constitutional: He is oriented to person, place, and time. He appears well-developed. No distress.   HENT:   Head: Normocephalic and atraumatic.   Eyes: EOM are normal.   Neck: Normal range of motion.   Cardiovascular: Normal rate and intact distal pulses.    Pulmonary/Chest: Effort normal. No respiratory distress.   Abdominal: Soft. He exhibits no distension. There is tenderness. There is no rebound and no guarding.   Midline incision c/d/i, penrose in place in RLQ tracking to R flank, SAMANTHA drain with serosang fluid        Musculoskeletal: Normal range of motion.   Neurological: He is alert and oriented to person, place, and time.   Skin: Skin is warm and dry.   Psychiatric: He has a normal mood and affect.     Significant Labs:  CBC (Last 3 Results):   Recent Labs  Lab 01/20/18  1718 01/21/18  0250 01/22/18  0531   WBC 7.13 6.07 6.18   RBC 3.01* 2.87* 2.90*   HGB 6.9* 6.7* 6.8*   HCT 22.7* 22.2* 22.0*    212 251   MCV 75* 77* 76*   MCH 22.9* 23.3* 23.4*   MCHC 30.4* 30.2* 30.9*     CMP (Last 3 Results):   Recent Labs  Lab 01/20/18  0321 01/21/18  0250 01/22/18  0531   * 111* 98   CALCIUM 8.0* 8.3* 8.5*   ALBUMIN 2.0* 2.0* 2.1*   PROT 5.8* 6.0 6.5   * 137 137   K 4.2 4.2 4.2   CO2 27 24 26    106 104   BUN 14 13 20   CREATININE 0.8 1.1 1.1   ALKPHOS 73 68 75   ALT 11 10 11   AST 14 14 16   BILITOT 0.4 0.4 0.3

## 2018-01-22 NOTE — ASSESSMENT & PLAN NOTE
42yoM now 4 Days Post-Op from ex lap, JS, takedown ECF.    -Will allow sips CLD. Continue TPN.  -Transition to PO pain meds with IV breakthrough.  -OOB/IS.  -Ambulate in hallway TID  -Remove SAMANTHA drain today.  -DVT ppx  -RN to changes dressings BID

## 2018-01-22 NOTE — PHYSICIAN QUERY
"PT Name: Pedro Nelson  MR #: [id number]    Physician Query Form - Hematology Clarification      CDS/: Melodie Wiseman               Contact information: [email or web address]    This form is a permanent document in the medical record.      Query Date: January 22, 2018    By submitting this query, we are merely seeking further clarification of documentation. Please utilize your independent clinical judgment when addressing the question(s) below.    The Medical record contains the following:   Indicators  Supporting Clinical Findings Location in Medical Record   x "Anemia" documented Anemia Anesthesia Report, Reviw of Systems   x H & H = Hgb= 7.8 - 9.3 - 8.5 - 6.8  Hct= 25.7 - 29.4 - 27.3 - 22 Labs, CBC 01/10 - 01/14 - 01/18 -1/22    BP =                     HR=      "GI bleeding" documented     x Acute bleeding (Non GI site) Estimated Blood Loss: 200mL Op Note   x Transfusion(s) Will transfuse 1u prbc for Hgb 6.8 CRS PN 01/22, Physician Orders    Treatment:     x Other:  Enterocutaneous fistula  Malnutrition    Crohn Ileitis CRS PN 01/22      Op Note     Provider, please specify diagnosis or diagnoses associated with above clinical findings.    [x ] Acute blood loss anemia expected post-operatively  [  ] Acute blood loss anemia  [  ] Chronic blood loss anemia  [x  ] Anemia of chronic disease ( Specify chronic disease)    [ x ] Other (Specify) ___Crohn's ileitis____________________________   [  ] Clinically Undetermined   [  ] Other Hematological Diagnosis (please specify): _________________________________  [  ] Clinically Undetermined       Please document in your progress notes daily for the duration of treatment, until resolved, and include in your discharge summary.                                                                                                      "

## 2018-01-22 NOTE — PROGRESS NOTES
[facility]  Colorectal Surgery  Progress Note    Patient Name: Pedro Nelson  MRN: [id number]  Admission Date: 1/10/2018  Hospital Length of Stay: 12 days  Attending Physician: OKSANA Upton MD    Subjective:     Interval History:     NAEON. Had some nausea yesterday but has since resolved. Ambulating. No bowel function yet. Pain controlled.    Post-Op Info:  Procedure(s) (LRB):  EXPLORATORY-LAPAROTOMY (N/A)  CATHETERIZATION-URETHRAL (Right)  LYSIS-ADHESION EXTENSIVE- LASTING MORE THAN 2 HRS  REPAIR-FISTULA-ENTEROCUTANEOUS WITH RESECTION  ENTERECTOMY  WRAP-OMENTAL   4 Days Post-Op      Medications:  Continuous Infusions:   hydromorphone in 0.9 % NaCl 6 mg/30 ml      TPN ADULT CENTRAL LINE CUSTOM 60 mL/hr at 01/21/18 2118    TPN ADULT CENTRAL LINE CUSTOM       Scheduled Meds:   alteplase  2 mg Intra-Catheter Weekly    ciprofloxacin  400 mg Intravenous Q12H    cyanocobalamin  1,000 mcg Intramuscular Q30 Days    enoxaparin  40 mg Subcutaneous Daily    fat emulsion 20%  250 mL Intravenous Daily    gabapentin  100 mg Oral TID    ibuprofen  400 mg Intravenous Q8H    metronidazole  500 mg Intravenous Q8H    pantoprazole  40 mg Intravenous Daily    sodium chloride 0.9%  10 mL Intravenous Q6H     PRN Meds:   sodium chloride    alteplase    barium    barium    diphenhydrAMINE    naloxone    omnipaque    ondansetron    promethazine (PHENERGAN) IVPB    ramelteon    sodium chloride 0.9%        Objective:     Vital Signs (Most Recent):  Temp: 97.3 °F (36.3 °C) (01/22/18 1105)  Pulse: 73 (01/22/18 1105)  Resp: 16 (01/22/18 1105)  BP: 108/68 (01/22/18 1105)  SpO2: 99 % (01/22/18 1105) Vital Signs (24h Range):  Temp:  [97.2 °F (36.2 °C)-98.1 °F (36.7 °C)] 97.3 °F (36.3 °C)  Pulse:  [73-96] 73  Resp:  [16-20] 16  SpO2:  [97 %-99 %] 99 %  BP: (102-120)/(56-68) 108/68     Intake/Output - Last 3 Shifts       01/20 0700 - 01/21 0659 01/21 0700 - 01/22 0659 01/22 0700 - 01/23 0659    P.O. 100  0     IV Piggyback   200    TPN       Total Intake(mL/kg) 100 (1.6) 0 (0) 200 (3.3)    Urine (mL/kg/hr) 1600 (1.1) 900 (0.6) 750 (2.7)    Emesis/NG output 0 (0) 0 (0)     Drains 40 (0) 5 (0)     Other 0 (0)      Stool 0 (0) 0 (0)     Blood 0 (0) 0 (0)     Total Output 1640 905 750    Net -1540 -905 -550           Stool Occurrence 0 x 0 x           Physical Exam   Constitutional: He is oriented to person, place, and time. He appears well-developed. No distress.   HENT:   Head: Normocephalic and atraumatic.   Eyes: EOM are normal.   Neck: Normal range of motion.   Cardiovascular: Normal rate and intact distal pulses.    Pulmonary/Chest: Effort normal. No respiratory distress.   Abdominal: Soft. He exhibits no distension. There is tenderness. There is no rebound and no guarding.   Midline incision c/d/i, penrose in place in RLQ tracking to R flank, SAMANTHA drain with serosang fluid        Musculoskeletal: Normal range of motion.   Neurological: He is alert and oriented to person, place, and time.   Skin: Skin is warm and dry.   Psychiatric: He has a normal mood and affect.     Significant Labs:  CBC (Last 3 Results):   Recent Labs  Lab 01/20/18  1718 01/21/18  0250 01/22/18  0531   WBC 7.13 6.07 6.18   RBC 3.01* 2.87* 2.90*   HGB 6.9* 6.7* 6.8*   HCT 22.7* 22.2* 22.0*    212 251   MCV 75* 77* 76*   MCH 22.9* 23.3* 23.4*   MCHC 30.4* 30.2* 30.9*     CMP (Last 3 Results):   Recent Labs  Lab 01/20/18  0321 01/21/18  0250 01/22/18  0531   * 111* 98   CALCIUM 8.0* 8.3* 8.5*   ALBUMIN 2.0* 2.0* 2.1*   PROT 5.8* 6.0 6.5   * 137 137   K 4.2 4.2 4.2   CO2 27 24 26    106 104   BUN 14 13 20   CREATININE 0.8 1.1 1.1   ALKPHOS 73 68 75   ALT 11 10 11   AST 14 14 16   BILITOT 0.4 0.4 0.3     Assessment/Plan:     * Enterocutaneous fistula    42yoM now 4 Days Post-Op from ex lap, JS, takedown ECF.    -Will allow sips CLD. Continue TPN.  -Transition to PO pain meds with IV breakthrough.  -Will transfuse 1u prbc  for Hgb 6.8.  -OOB/IS.  -Ambulate in hallway TID  -Remove SAMANTHA drain today.  -DVT ppx  -RN to changes dressings BID        Malnutrition    Cont TPN              Gagandeep Gordillo MD  Colorectal Surgery  [facility]

## 2018-01-22 NOTE — PLAN OF CARE
SW following for d/c needs.  Pt anticipated d/c is 1/25/2018.  Pt expected to d/c home with no needs.              Aron Gibbs, LMSW [facility]  [id number]

## 2018-01-22 NOTE — PLAN OF CARE
01/22/18 1218   Discharge Reassessment   Assessment Type Discharge Planning Reassessment   Provided patient/caregiver education on the expected discharge date and the discharge plan Yes   Do you have any problems affording any of your prescribed medications? Yes   Discharge Plan A Home with family;Home Health   Discharge Plan B Home with family

## 2018-01-22 NOTE — ANESTHESIA POSTPROCEDURE EVALUATION
"Anesthesia Post Evaluation    Patient: Pedro Nelson    Procedure(s) Performed: Procedure(s) (LRB):  EXPLORATORY-LAPAROTOMY (N/A)  CATHETERIZATION-URETHRAL (Right)  LYSIS-ADHESION EXTENSIVE- LASTING MORE THAN 2 HRS  REPAIR-FISTULA-ENTEROCUTANEOUS WITH RESECTION  ENTERECTOMY  WRAP-OMENTAL    Final Anesthesia Type: general  Patient location during evaluation: PACU  Patient participation: Yes- Able to Participate  Level of consciousness: awake and alert  Post-procedure vital signs: reviewed and stable  Pain management: adequate  Airway patency: patent  PONV status at discharge: No PONV  Anesthetic complications: no      Cardiovascular status: blood pressure returned to baseline and hemodynamically stable  Respiratory status: unassisted, spontaneous ventilation and room air  Hydration status: euvolemic  Follow-up not needed.        Visit Vitals  /65 (BP Location: Left arm, Patient Position: Lying)   Pulse 74   Temp 36.3 °C (97.4 °F) (Oral)   Resp 18   Ht 6' 3" (1.905 m)   Wt 61.1 kg (134 lb 11.2 oz)   SpO2 98%   BMI 16.84 kg/m²       Pain/Pravin Score: Pain Assessment Performed: Yes (1/22/2018  6:00 AM)  Presence of Pain: complains of pain/discomfort (1/22/2018  6:00 AM)  Pain Rating Prior to Med Admin: 6 (1/22/2018  6:05 AM)      "

## 2018-01-23 LAB
ALBUMIN SERPL BCP-MCNC: 2.2 G/DL
ALP SERPL-CCNC: 84 U/L
ALT SERPL W/O P-5'-P-CCNC: 8 U/L
ANION GAP SERPL CALC-SCNC: 7 MMOL/L
AST SERPL-CCNC: 10 U/L
BASOPHILS # BLD AUTO: 0.01 K/UL
BASOPHILS NFR BLD: 0.1 %
BILIRUB SERPL-MCNC: 0.4 MG/DL
BUN SERPL-MCNC: 23 MG/DL
CALCIUM SERPL-MCNC: 8.7 MG/DL
CHLORIDE SERPL-SCNC: 105 MMOL/L
CO2 SERPL-SCNC: 26 MMOL/L
CREAT SERPL-MCNC: 0.9 MG/DL
CRP SERPL-MCNC: 47.2 MG/L
DIFFERENTIAL METHOD: ABNORMAL
EOSINOPHIL # BLD AUTO: 0.2 K/UL
EOSINOPHIL NFR BLD: 2.2 %
ERYTHROCYTE [DISTWIDTH] IN BLOOD BY AUTOMATED COUNT: 16.9 %
EST. GFR  (AFRICAN AMERICAN): >60 ML/MIN/1.73 M^2
EST. GFR  (NON AFRICAN AMERICAN): >60 ML/MIN/1.73 M^2
GLUCOSE SERPL-MCNC: 117 MG/DL
HCT VFR BLD AUTO: 25.7 %
HGB BLD-MCNC: 8.2 G/DL
IMM GRANULOCYTES # BLD AUTO: 0.02 K/UL
IMM GRANULOCYTES NFR BLD AUTO: 0.3 %
LYMPHOCYTES # BLD AUTO: 1.2 K/UL
LYMPHOCYTES NFR BLD: 15.9 %
MAGNESIUM SERPL-MCNC: 1.9 MG/DL
MCH RBC QN AUTO: 24.1 PG
MCHC RBC AUTO-ENTMCNC: 31.9 G/DL
MCV RBC AUTO: 76 FL
MONOCYTES # BLD AUTO: 0.7 K/UL
MONOCYTES NFR BLD: 9.3 %
NEUTROPHILS # BLD AUTO: 5.5 K/UL
NEUTROPHILS NFR BLD: 72.2 %
NRBC BLD-RTO: 0 /100 WBC
PHOSPHATE SERPL-MCNC: 4.1 MG/DL
PLATELET # BLD AUTO: 278 K/UL
PMV BLD AUTO: 9.3 FL
POTASSIUM SERPL-SCNC: 4.2 MMOL/L
PROT SERPL-MCNC: 6.8 G/DL
RBC # BLD AUTO: 3.4 M/UL
SODIUM SERPL-SCNC: 138 MMOL/L
WBC # BLD AUTO: 7.62 K/UL

## 2018-01-23 PROCEDURE — 63600175 PHARM REV CODE 636 W HCPCS: Performed by: NURSE PRACTITIONER

## 2018-01-23 PROCEDURE — C9113 INJ PANTOPRAZOLE SODIUM, VIA: HCPCS | Performed by: SURGERY

## 2018-01-23 PROCEDURE — B4185 PARENTERAL SOL 10 GM LIPIDS: HCPCS | Performed by: NURSE PRACTITIONER

## 2018-01-23 PROCEDURE — 63600175 PHARM REV CODE 636 W HCPCS: Performed by: STUDENT IN AN ORGANIZED HEALTH CARE EDUCATION/TRAINING PROGRAM

## 2018-01-23 PROCEDURE — 25000003 PHARM REV CODE 250: Performed by: COLON & RECTAL SURGERY

## 2018-01-23 PROCEDURE — A4216 STERILE WATER/SALINE, 10 ML: HCPCS | Performed by: COLON & RECTAL SURGERY

## 2018-01-23 PROCEDURE — 80053 COMPREHEN METABOLIC PANEL: CPT

## 2018-01-23 PROCEDURE — A4217 STERILE WATER/SALINE, 500 ML: HCPCS | Performed by: NURSE PRACTITIONER

## 2018-01-23 PROCEDURE — S0030 INJECTION, METRONIDAZOLE: HCPCS | Performed by: COLON & RECTAL SURGERY

## 2018-01-23 PROCEDURE — 85025 COMPLETE CBC W/AUTO DIFF WBC: CPT

## 2018-01-23 PROCEDURE — 25000003 PHARM REV CODE 250: Performed by: STUDENT IN AN ORGANIZED HEALTH CARE EDUCATION/TRAINING PROGRAM

## 2018-01-23 PROCEDURE — 63600175 PHARM REV CODE 636 W HCPCS: Performed by: SURGERY

## 2018-01-23 PROCEDURE — 11000001 HC ACUTE MED/SURG PRIVATE ROOM

## 2018-01-23 PROCEDURE — 25000003 PHARM REV CODE 250: Performed by: NURSE PRACTITIONER

## 2018-01-23 PROCEDURE — 25000003 PHARM REV CODE 250: Performed by: SURGERY

## 2018-01-23 PROCEDURE — 83735 ASSAY OF MAGNESIUM: CPT

## 2018-01-23 PROCEDURE — 86140 C-REACTIVE PROTEIN: CPT

## 2018-01-23 PROCEDURE — 84100 ASSAY OF PHOSPHORUS: CPT

## 2018-01-23 RX ADMIN — MEPERIDINE HYDROCHLORIDE 50 MG: 50 INJECTION INTRAMUSCULAR; INTRAVENOUS; SUBCUTANEOUS at 12:01

## 2018-01-23 RX ADMIN — METRONIDAZOLE 500 MG: 500 INJECTION, SOLUTION INTRAVENOUS at 03:01

## 2018-01-23 RX ADMIN — SODIUM CHLORIDE, SODIUM LACTATE, POTASSIUM CHLORIDE, AND CALCIUM CHLORIDE 1000 ML: .6; .31; .03; .02 INJECTION, SOLUTION INTRAVENOUS at 05:01

## 2018-01-23 RX ADMIN — SOYBEAN OIL 250 ML: 20 INJECTION, SOLUTION INTRAVENOUS at 08:01

## 2018-01-23 RX ADMIN — ONDANSETRON 4 MG: 2 INJECTION INTRAMUSCULAR; INTRAVENOUS at 12:01

## 2018-01-23 RX ADMIN — ENOXAPARIN SODIUM 40 MG: 100 INJECTION SUBCUTANEOUS at 05:01

## 2018-01-23 RX ADMIN — ONDANSETRON 4 MG: 2 INJECTION INTRAMUSCULAR; INTRAVENOUS at 07:01

## 2018-01-23 RX ADMIN — PANTOPRAZOLE SODIUM 40 MG: 40 INJECTION, POWDER, FOR SOLUTION INTRAVENOUS at 09:01

## 2018-01-23 RX ADMIN — MEPERIDINE HYDROCHLORIDE 50 MG: 50 INJECTION INTRAMUSCULAR; INTRAVENOUS; SUBCUTANEOUS at 07:01

## 2018-01-23 RX ADMIN — IBUPROFEN 400 MG: 800 INJECTION INTRAVENOUS at 06:01

## 2018-01-23 RX ADMIN — CALCIUM GLUCONATE: 94 INJECTION, SOLUTION INTRAVENOUS at 08:01

## 2018-01-23 RX ADMIN — GABAPENTIN 100 MG: 100 CAPSULE ORAL at 08:01

## 2018-01-23 RX ADMIN — IBUPROFEN 400 MG: 800 INJECTION INTRAVENOUS at 01:01

## 2018-01-23 RX ADMIN — OXYCODONE HYDROCHLORIDE 10 MG: 5 TABLET ORAL at 06:01

## 2018-01-23 RX ADMIN — MEPERIDINE HYDROCHLORIDE 50 MG: 50 INJECTION INTRAMUSCULAR; INTRAVENOUS; SUBCUTANEOUS at 11:01

## 2018-01-23 RX ADMIN — GABAPENTIN 100 MG: 100 CAPSULE ORAL at 06:01

## 2018-01-23 RX ADMIN — GABAPENTIN 100 MG: 100 CAPSULE ORAL at 01:01

## 2018-01-23 RX ADMIN — ONDANSETRON 4 MG: 2 INJECTION INTRAMUSCULAR; INTRAVENOUS at 06:01

## 2018-01-23 RX ADMIN — OXYCODONE HYDROCHLORIDE 10 MG: 5 TABLET ORAL at 09:01

## 2018-01-23 RX ADMIN — OXYCODONE HYDROCHLORIDE 10 MG: 5 TABLET ORAL at 05:01

## 2018-01-23 RX ADMIN — Medication 10 ML: at 05:01

## 2018-01-23 RX ADMIN — Medication 10 ML: at 12:01

## 2018-01-23 RX ADMIN — IBUPROFEN 400 MG: 800 INJECTION INTRAVENOUS at 08:01

## 2018-01-23 RX ADMIN — MEPERIDINE HYDROCHLORIDE 50 MG: 50 INJECTION INTRAMUSCULAR; INTRAVENOUS; SUBCUTANEOUS at 09:01

## 2018-01-23 RX ADMIN — OXYCODONE HYDROCHLORIDE 10 MG: 5 TABLET ORAL at 12:01

## 2018-01-23 RX ADMIN — Medication 10 ML: at 06:01

## 2018-01-23 NOTE — SUBJECTIVE & OBJECTIVE
Subjective:     Interval History:     Episode of emesis last night and small one this morning. Belching. No BM or flatus yet. Pain controlled. Ambulating, voiding.    Post-Op Info:  Procedure(s) (LRB):  EXPLORATORY-LAPAROTOMY (N/A)  CATHETERIZATION-URETHRAL (Right)  LYSIS-ADHESION EXTENSIVE- LASTING MORE THAN 2 HRS  REPAIR-FISTULA-ENTEROCUTANEOUS WITH RESECTION  ENTERECTOMY  WRAP-OMENTAL   5 Days Post-Op      Medications:  Continuous Infusions:   TPN ADULT CENTRAL LINE CUSTOM 60 mL/hr at 01/22/18 2158    TPN ADULT CENTRAL LINE CUSTOM       Scheduled Meds:   alteplase  2 mg Intra-Catheter Weekly    cyanocobalamin  1,000 mcg Intramuscular Q30 Days    enoxaparin  40 mg Subcutaneous Daily    fat emulsion 20%  250 mL Intravenous Daily    gabapentin  100 mg Oral TID    ibuprofen  400 mg Intravenous Q8H    pantoprazole  40 mg Intravenous Daily    sodium chloride 0.9%  10 mL Intravenous Q6H     PRN Meds:   sodium chloride    alteplase    barium    barium    diphenhydrAMINE    meperidine    naloxone    omnipaque    ondansetron    oxyCODONE    promethazine (PHENERGAN) IVPB    ramelteon    sodium chloride 0.9%        Objective:     Vital Signs (Most Recent):  Temp: 97.4 °F (36.3 °C) (01/23/18 0800)  Pulse: 67 (01/23/18 0800)  Resp: 18 (01/23/18 0800)  BP: 120/75 (01/23/18 0331)  SpO2: 100 % (01/23/18 0331) Vital Signs (24h Range):  Temp:  [96.2 °F (35.7 °C)-98.1 °F (36.7 °C)] 97.4 °F (36.3 °C)  Pulse:  [62-87] 67  Resp:  [16-18] 18  SpO2:  [98 %-100 %] 100 %  BP: (108-122)/(66-75) 120/75     Intake/Output - Last 3 Shifts       01/21 0700 - 01/22 0659 01/22 0700 - 01/23 0659 01/23 0700 - 01/24 0659    P.O. 0 600     IV Piggyback  200     TPN  600     Total Intake(mL/kg) 0 (0) 1400 (22.9)     Urine (mL/kg/hr) 900 (0.6) 750 (0.5)     Emesis/NG output 0 (0) 200 (0.1)     Drains 5 (0) 0 (0)     Other       Stool 0 (0)      Blood 0 (0)      Total Output 905 950      Net -905 +450             Urine Occurrence   1250 x     Stool Occurrence 0 x            Physical Exam   Constitutional: He is oriented to person, place, and time. He appears well-developed. No distress.   HENT:   Head: Normocephalic and atraumatic.   Eyes: EOM are normal.   Neck: Normal range of motion.   Cardiovascular: Normal rate and intact distal pulses.    Pulmonary/Chest: Effort normal. No respiratory distress.   Abdominal: Soft. He exhibits no distension. There is tenderness. There is no rebound and no guarding.   Midline incision c/d/i, penrose in place in RLQ tracking to R flank, SAMANTHA drain with serosang fluid        Musculoskeletal: Normal range of motion.   Neurological: He is alert and oriented to person, place, and time.   Skin: Skin is warm and dry.   Psychiatric: He has a normal mood and affect.     Significant Labs:  BMP (Last 3 Results):   Recent Labs  Lab 01/18/18  0619  01/21/18 0250 01/22/18  0531 01/23/18  0338   GLU 88  < > 111* 98 117*   *  < > 137 137 138   K 4.2  < > 4.2 4.2 4.2     < > 106 104 105   CO2 25  < > 24 26 26   BUN 16  < > 13 20 23*   CREATININE 0.8  < > 1.1 1.1 0.9   CALCIUM 8.8  < > 8.3* 8.5* 8.7   MG 1.9  --   --  2.0 1.9   < > = values in this interval not displayed.  CBC (Last 3 Results):   Recent Labs  Lab 01/21/18  0250 01/22/18  0531 01/23/18  0338   WBC 6.07 6.18 7.62   RBC 2.87* 2.90* 3.40*   HGB 6.7* 6.8* 8.2*   HCT 22.2* 22.0* 25.7*    251 278   MCV 77* 76* 76*   MCH 23.3* 23.4* 24.1*   MCHC 30.2* 30.9* 31.9*     CRP (Last 3 Results):   Recent Labs  Lab 01/22/18  0531 01/23/18  0338   CRP 72.7* 47.2*

## 2018-01-23 NOTE — PLAN OF CARE
Problem: Patient Care Overview  Goal: Plan of Care Review  Outcome: Ongoing (interventions implemented as appropriate)  Pt alert and oriented to name, , place and situation. Pt bed in lowest position with call light within reach. Safety precautions maintained. No falls observed or reported, at this time. Pt pain assessed and managed. SCDs in place. Pt assisted with toileting. Will continue to monitor

## 2018-01-23 NOTE — PROGRESS NOTES
[facility]  Colorectal Surgery  Progress Note    Patient Name: Pedro Nelson  MRN: [id number]  Admission Date: 1/10/2018  Hospital Length of Stay: 13 days  Attending Physician: OKSANA Upton MD    Subjective:     Interval History:     Episode of emesis last night and small one this morning. Belching. No BM or flatus yet. Pain controlled. Ambulating, voiding.    Post-Op Info:  Procedure(s) (LRB):  EXPLORATORY-LAPAROTOMY (N/A)  CATHETERIZATION-URETHRAL (Right)  LYSIS-ADHESION EXTENSIVE- LASTING MORE THAN 2 HRS  REPAIR-FISTULA-ENTEROCUTANEOUS WITH RESECTION  ENTERECTOMY  WRAP-OMENTAL   5 Days Post-Op      Medications:  Continuous Infusions:   TPN ADULT CENTRAL LINE CUSTOM 60 mL/hr at 01/22/18 2158    TPN ADULT CENTRAL LINE CUSTOM       Scheduled Meds:   alteplase  2 mg Intra-Catheter Weekly    cyanocobalamin  1,000 mcg Intramuscular Q30 Days    enoxaparin  40 mg Subcutaneous Daily    fat emulsion 20%  250 mL Intravenous Daily    gabapentin  100 mg Oral TID    ibuprofen  400 mg Intravenous Q8H    pantoprazole  40 mg Intravenous Daily    sodium chloride 0.9%  10 mL Intravenous Q6H     PRN Meds:   sodium chloride    alteplase    barium    barium    diphenhydrAMINE    meperidine    naloxone    omnipaque    ondansetron    oxyCODONE    promethazine (PHENERGAN) IVPB    ramelteon    sodium chloride 0.9%        Objective:     Vital Signs (Most Recent):  Temp: 97.4 °F (36.3 °C) (01/23/18 0800)  Pulse: 67 (01/23/18 0800)  Resp: 18 (01/23/18 0800)  BP: 120/75 (01/23/18 0331)  SpO2: 100 % (01/23/18 0331) Vital Signs (24h Range):  Temp:  [96.2 °F (35.7 °C)-98.1 °F (36.7 °C)] 97.4 °F (36.3 °C)  Pulse:  [62-87] 67  Resp:  [16-18] 18  SpO2:  [98 %-100 %] 100 %  BP: (108-122)/(66-75) 120/75     Intake/Output - Last 3 Shifts       01/21 0700 - 01/22 0659 01/22 0700 - 01/23 0659 01/23 0700 - 01/24 0659    P.O. 0 600     IV Piggyback  200     TPN  600     Total Intake(mL/kg) 0 (0) 1400 (22.9)      Urine (mL/kg/hr) 900 (0.6) 750 (0.5)     Emesis/NG output 0 (0) 200 (0.1)     Drains 5 (0) 0 (0)     Other       Stool 0 (0)      Blood 0 (0)      Total Output 905 950      Net -905 +450             Urine Occurrence  1250 x     Stool Occurrence 0 x            Physical Exam   Constitutional: He is oriented to person, place, and time. He appears well-developed. No distress.   HENT:   Head: Normocephalic and atraumatic.   Eyes: EOM are normal.   Neck: Normal range of motion.   Cardiovascular: Normal rate and intact distal pulses.    Pulmonary/Chest: Effort normal. No respiratory distress.   Abdominal: Soft. He exhibits no distension. There is tenderness. There is no rebound and no guarding.   Midline incision c/d/i, penrose in place in RLQ tracking to R flank, SAMANTHA drain with serosang fluid        Musculoskeletal: Normal range of motion.   Neurological: He is alert and oriented to person, place, and time.   Skin: Skin is warm and dry.   Psychiatric: He has a normal mood and affect.     Significant Labs:  BMP (Last 3 Results):   Recent Labs  Lab 01/18/18  0619  01/21/18  0250 01/22/18  0531 01/23/18  0338   GLU 88  < > 111* 98 117*   *  < > 137 137 138   K 4.2  < > 4.2 4.2 4.2     < > 106 104 105   CO2 25  < > 24 26 26   BUN 16  < > 13 20 23*   CREATININE 0.8  < > 1.1 1.1 0.9   CALCIUM 8.8  < > 8.3* 8.5* 8.7   MG 1.9  --   --  2.0 1.9   < > = values in this interval not displayed.  CBC (Last 3 Results):   Recent Labs  Lab 01/21/18  0250 01/22/18  0531 01/23/18  0338   WBC 6.07 6.18 7.62   RBC 2.87* 2.90* 3.40*   HGB 6.7* 6.8* 8.2*   HCT 22.2* 22.0* 25.7*    251 278   MCV 77* 76* 76*   MCH 23.3* 23.4* 24.1*   MCHC 30.2* 30.9* 31.9*     CRP (Last 3 Results):   Recent Labs  Lab 01/22/18  0531 01/23/18  0338   CRP 72.7* 47.2*     Assessment/Plan:     * Enterocutaneous fistula    42yoM now 5 Days Post-Op from ex lap, JS, takedown ECF.    -NPO x meds. Continue TPN. Get flat/upright.  -Pain/nausea meds as  needed.  -OOB/IS.  -Ambulate in hallway TID  -DVT ppx        Malnutrition    Cont TPN              Gagandeep Gordillo MD  Colorectal Surgery  [facility]

## 2018-01-23 NOTE — ASSESSMENT & PLAN NOTE
42yoM now 5 Days Post-Op from ex lap, JS, takedown ECF.    -NPO x meds. Continue TPN. Get flat/upright.  -Pain/nausea meds as needed.  -OOB/IS.  -Ambulate in hallway TID  -DVT ppx

## 2018-01-24 LAB
ALBUMIN SERPL BCP-MCNC: 2.3 G/DL
ALP SERPL-CCNC: 105 U/L
ALT SERPL W/O P-5'-P-CCNC: 8 U/L
ANION GAP SERPL CALC-SCNC: 8 MMOL/L
AST SERPL-CCNC: 10 U/L
BASOPHILS # BLD AUTO: 0.02 K/UL
BASOPHILS NFR BLD: 0.3 %
BILIRUB SERPL-MCNC: 0.3 MG/DL
BUN SERPL-MCNC: 23 MG/DL
CALCIUM SERPL-MCNC: 8.8 MG/DL
CHLORIDE SERPL-SCNC: 105 MMOL/L
CO2 SERPL-SCNC: 26 MMOL/L
CREAT SERPL-MCNC: 0.8 MG/DL
CRP SERPL-MCNC: 26.8 MG/L
DIFFERENTIAL METHOD: ABNORMAL
EOSINOPHIL # BLD AUTO: 0.3 K/UL
EOSINOPHIL NFR BLD: 4.1 %
ERYTHROCYTE [DISTWIDTH] IN BLOOD BY AUTOMATED COUNT: 17.5 %
EST. GFR  (AFRICAN AMERICAN): >60 ML/MIN/1.73 M^2
EST. GFR  (NON AFRICAN AMERICAN): >60 ML/MIN/1.73 M^2
GLUCOSE SERPL-MCNC: 84 MG/DL
HCT VFR BLD AUTO: 26.9 %
HGB BLD-MCNC: 8.6 G/DL
IMM GRANULOCYTES # BLD AUTO: 0.04 K/UL
IMM GRANULOCYTES NFR BLD AUTO: 0.6 %
LYMPHOCYTES # BLD AUTO: 1.4 K/UL
LYMPHOCYTES NFR BLD: 18.9 %
MAGNESIUM SERPL-MCNC: 1.9 MG/DL
MCH RBC QN AUTO: 24.9 PG
MCHC RBC AUTO-ENTMCNC: 32 G/DL
MCV RBC AUTO: 78 FL
MONOCYTES # BLD AUTO: 0.9 K/UL
MONOCYTES NFR BLD: 12.3 %
NEUTROPHILS # BLD AUTO: 4.6 K/UL
NEUTROPHILS NFR BLD: 63.8 %
NRBC BLD-RTO: 0 /100 WBC
PHOSPHATE SERPL-MCNC: 4.2 MG/DL
PLATELET # BLD AUTO: 285 K/UL
PMV BLD AUTO: 9.4 FL
POTASSIUM SERPL-SCNC: 4.4 MMOL/L
PROT SERPL-MCNC: 7.1 G/DL
RBC # BLD AUTO: 3.46 M/UL
SODIUM SERPL-SCNC: 139 MMOL/L
WBC # BLD AUTO: 7.24 K/UL

## 2018-01-24 PROCEDURE — A4217 STERILE WATER/SALINE, 500 ML: HCPCS | Performed by: NURSE PRACTITIONER

## 2018-01-24 PROCEDURE — 63600175 PHARM REV CODE 636 W HCPCS: Performed by: STUDENT IN AN ORGANIZED HEALTH CARE EDUCATION/TRAINING PROGRAM

## 2018-01-24 PROCEDURE — 25000003 PHARM REV CODE 250: Performed by: COLON & RECTAL SURGERY

## 2018-01-24 PROCEDURE — 11000001 HC ACUTE MED/SURG PRIVATE ROOM

## 2018-01-24 PROCEDURE — 85025 COMPLETE CBC W/AUTO DIFF WBC: CPT

## 2018-01-24 PROCEDURE — 80053 COMPREHEN METABOLIC PANEL: CPT

## 2018-01-24 PROCEDURE — 84100 ASSAY OF PHOSPHORUS: CPT

## 2018-01-24 PROCEDURE — B4185 PARENTERAL SOL 10 GM LIPIDS: HCPCS | Performed by: NURSE PRACTITIONER

## 2018-01-24 PROCEDURE — 63600175 PHARM REV CODE 636 W HCPCS: Performed by: NURSE PRACTITIONER

## 2018-01-24 PROCEDURE — 83735 ASSAY OF MAGNESIUM: CPT

## 2018-01-24 PROCEDURE — 63600175 PHARM REV CODE 636 W HCPCS: Performed by: SURGERY

## 2018-01-24 PROCEDURE — 25000003 PHARM REV CODE 250: Performed by: SURGERY

## 2018-01-24 PROCEDURE — 86140 C-REACTIVE PROTEIN: CPT

## 2018-01-24 PROCEDURE — C9113 INJ PANTOPRAZOLE SODIUM, VIA: HCPCS | Performed by: SURGERY

## 2018-01-24 PROCEDURE — 25000003 PHARM REV CODE 250: Performed by: NURSE PRACTITIONER

## 2018-01-24 PROCEDURE — A4216 STERILE WATER/SALINE, 10 ML: HCPCS | Performed by: COLON & RECTAL SURGERY

## 2018-01-24 RX ORDER — MEPERIDINE HYDROCHLORIDE 50 MG/ML
40 INJECTION INTRAMUSCULAR; INTRAVENOUS; SUBCUTANEOUS EVERY 4 HOURS PRN
Status: DISPENSED | OUTPATIENT
Start: 2018-01-24 | End: 2018-01-26

## 2018-01-24 RX ADMIN — OXYCODONE HYDROCHLORIDE 10 MG: 5 TABLET ORAL at 05:01

## 2018-01-24 RX ADMIN — GABAPENTIN 100 MG: 100 CAPSULE ORAL at 09:01

## 2018-01-24 RX ADMIN — CALCIUM GLUCONATE: 94 INJECTION, SOLUTION INTRAVENOUS at 09:01

## 2018-01-24 RX ADMIN — Medication 10 ML: at 05:01

## 2018-01-24 RX ADMIN — SOYBEAN OIL 250 ML: 20 INJECTION, SOLUTION INTRAVENOUS at 09:01

## 2018-01-24 RX ADMIN — OXYCODONE HYDROCHLORIDE 10 MG: 5 TABLET ORAL at 09:01

## 2018-01-24 RX ADMIN — Medication 10 ML: at 11:01

## 2018-01-24 RX ADMIN — MEPERIDINE HYDROCHLORIDE 50 MG: 50 INJECTION INTRAMUSCULAR; INTRAVENOUS; SUBCUTANEOUS at 07:01

## 2018-01-24 RX ADMIN — IBUPROFEN 400 MG: 800 INJECTION INTRAVENOUS at 05:01

## 2018-01-24 RX ADMIN — ONDANSETRON 4 MG: 2 INJECTION INTRAMUSCULAR; INTRAVENOUS at 05:01

## 2018-01-24 RX ADMIN — MEPERIDINE HYDROCHLORIDE 50 MG: 50 INJECTION INTRAMUSCULAR; INTRAVENOUS; SUBCUTANEOUS at 03:01

## 2018-01-24 RX ADMIN — GABAPENTIN 100 MG: 100 CAPSULE ORAL at 05:01

## 2018-01-24 RX ADMIN — OXYCODONE HYDROCHLORIDE 10 MG: 5 TABLET ORAL at 02:01

## 2018-01-24 RX ADMIN — GABAPENTIN 100 MG: 100 CAPSULE ORAL at 02:01

## 2018-01-24 RX ADMIN — ENOXAPARIN SODIUM 40 MG: 100 INJECTION SUBCUTANEOUS at 05:01

## 2018-01-24 RX ADMIN — MEPERIDINE HYDROCHLORIDE 50 MG: 50 INJECTION INTRAMUSCULAR; INTRAVENOUS; SUBCUTANEOUS at 11:01

## 2018-01-24 RX ADMIN — IBUPROFEN 400 MG: 800 INJECTION INTRAVENOUS at 09:01

## 2018-01-24 RX ADMIN — MEPERIDINE HYDROCHLORIDE 40 MG: 50 INJECTION INTRAMUSCULAR; INTRAVENOUS; SUBCUTANEOUS at 09:01

## 2018-01-24 RX ADMIN — OXYCODONE HYDROCHLORIDE 10 MG: 5 TABLET ORAL at 06:01

## 2018-01-24 RX ADMIN — IBUPROFEN 400 MG: 800 INJECTION INTRAVENOUS at 02:01

## 2018-01-24 RX ADMIN — PANTOPRAZOLE SODIUM 40 MG: 40 INJECTION, POWDER, FOR SOLUTION INTRAVENOUS at 09:01

## 2018-01-24 NOTE — ASSESSMENT & PLAN NOTE
42yoM now 6 Days Post-Op from ex lap, JS, takedown ECF.    -Sips of clears   -Continue TPN   -Pain/nausea meds as needed.  -OOB/IS.  -Ambulate in hallway TID  -DVT ppx

## 2018-01-24 NOTE — SUBJECTIVE & OBJECTIVE
Subjective:     Interval History:   N/v improved. Still awaiting bowel function.     Post-Op Info:  Procedure(s) (LRB):  EXPLORATORY-LAPAROTOMY (N/A)  CATHETERIZATION-URETHRAL (Right)  LYSIS-ADHESION EXTENSIVE- LASTING MORE THAN 2 HRS  REPAIR-FISTULA-ENTEROCUTANEOUS WITH RESECTION  ENTERECTOMY  WRAP-OMENTAL   6 Days Post-Op      Medications:  Continuous Infusions:   TPN ADULT CENTRAL LINE CUSTOM 60 mL/hr at 01/23/18 2052     Scheduled Meds:   alteplase  2 mg Intra-Catheter Weekly    cyanocobalamin  1,000 mcg Intramuscular Q30 Days    enoxaparin  40 mg Subcutaneous Daily    gabapentin  100 mg Oral TID    ibuprofen  400 mg Intravenous Q8H    pantoprazole  40 mg Intravenous Daily    sodium chloride 0.9%  10 mL Intravenous Q6H     PRN Meds:   sodium chloride    alteplase    barium    barium    diphenhydrAMINE    meperidine    naloxone    omnipaque    ondansetron    oxyCODONE    promethazine (PHENERGAN) IVPB    ramelteon    sodium chloride 0.9%        Objective:     Vital Signs (Most Recent):  Temp: 97.2 °F (36.2 °C) (01/24/18 0805)  Pulse: 66 (01/24/18 0805)  Resp: 16 (01/24/18 0805)  BP: (!) 109/58 (01/24/18 0805)  SpO2: 98 % (01/24/18 0805) Vital Signs (24h Range):  Temp:  [97 °F (36.1 °C)-98.4 °F (36.9 °C)] 97.2 °F (36.2 °C)  Pulse:  [56-80] 66  Resp:  [14-18] 16  SpO2:  [87 %-99 %] 98 %  BP: (100-125)/(57-68) 109/58     Intake/Output - Last 3 Shifts       01/22 0700 - 01/23 0659 01/23 0700 - 01/24 0659 01/24 0700 - 01/25 0659    P.O. 600 120     I.V. (mL/kg)  300 (4.9)     IV Piggyback 200 1000      720     Total Intake(mL/kg) 1400 (22.9) 2140 (35)     Urine (mL/kg/hr) 750 (0.5) 2650 (1.8)     Emesis/NG output 200 (0.1) 0 (0)     Drains 0 (0)      Stool  0 (0)     Blood       Total Output 950 2650      Net +450 -510             Urine Occurrence 1250 x      Stool Occurrence  0 x     Emesis Occurrence  0 x           Physical Exam   Constitutional: He is oriented to person, place, and  time. He appears well-developed. No distress.   HENT:   Head: Normocephalic and atraumatic.   Eyes: EOM are normal.   Neck: Normal range of motion.   Cardiovascular: Normal rate and intact distal pulses.    Pulmonary/Chest: Effort normal. No respiratory distress.   Abdominal: Soft. He exhibits no distension. There is tenderness. There is no rebound and no guarding.   Midline incision c/d/i, penrose in place in RLQ tracking to R flank,    Musculoskeletal: Normal range of motion.   Neurological: He is alert and oriented to person, place, and time.   Skin: Skin is warm and dry.   Psychiatric: He has a normal mood and affect.     Significant Labs:  BMP (Last 3 Results):     Recent Labs  Lab 01/22/18  0531 01/23/18  0338 01/24/18  0433   GLU 98 117* 84    138 139   K 4.2 4.2 4.4    105 105   CO2 26 26 26   BUN 20 23* 23*   CREATININE 1.1 0.9 0.8   CALCIUM 8.5* 8.7 8.8   MG 2.0 1.9 1.9     CBC (Last 3 Results):     Recent Labs  Lab 01/22/18  0531 01/23/18  0338 01/24/18  0433   WBC 6.18 7.62 7.24   RBC 2.90* 3.40* 3.46*   HGB 6.8* 8.2* 8.6*   HCT 22.0* 25.7* 26.9*    278 285   MCV 76* 76* 78*   MCH 23.4* 24.1* 24.9*   MCHC 30.9* 31.9* 32.0     CRP (Last 3 Results):     Recent Labs  Lab 01/22/18  0531 01/23/18  0338 01/24/18  0433   CRP 72.7* 47.2* 26.8*

## 2018-01-24 NOTE — PLAN OF CARE
Problem: Patient Care Overview  Goal: Plan of Care Review  Outcome: Ongoing (interventions implemented as appropriate)  Pt is AAOx4. VSS. No falls/injury as pt calls for assistance when needed. No s/s of skin breakdown as pt is independent with re-positioning self. Pain being monitored and controlled with PRN and scheduled meds. No s/s of infection noted- midline incision open to air clean and intact. TPN infusing as ordered. Pt voiding per urinal. PICC line dressing changed today. Bed in lowest position. Call light within reach. Will continue to monitor.

## 2018-01-24 NOTE — PROGRESS NOTES
[facility]  Colorectal Surgery  Progress Note    Patient Name: Pedro Nelson  MRN: [id number]  Admission Date: 1/10/2018  Hospital Length of Stay: 14 days  Attending Physician: OKSANA Upton MD    Subjective:     Interval History:   N/v improved. Still awaiting bowel function.     Post-Op Info:  Procedure(s) (LRB):  EXPLORATORY-LAPAROTOMY (N/A)  CATHETERIZATION-URETHRAL (Right)  LYSIS-ADHESION EXTENSIVE- LASTING MORE THAN 2 HRS  REPAIR-FISTULA-ENTEROCUTANEOUS WITH RESECTION  ENTERECTOMY  WRAP-OMENTAL   6 Days Post-Op      Medications:  Continuous Infusions:   TPN ADULT CENTRAL LINE CUSTOM 60 mL/hr at 01/23/18 2052     Scheduled Meds:   alteplase  2 mg Intra-Catheter Weekly    cyanocobalamin  1,000 mcg Intramuscular Q30 Days    enoxaparin  40 mg Subcutaneous Daily    gabapentin  100 mg Oral TID    ibuprofen  400 mg Intravenous Q8H    pantoprazole  40 mg Intravenous Daily    sodium chloride 0.9%  10 mL Intravenous Q6H     PRN Meds:   sodium chloride    alteplase    barium    barium    diphenhydrAMINE    meperidine    naloxone    omnipaque    ondansetron    oxyCODONE    promethazine (PHENERGAN) IVPB    ramelteon    sodium chloride 0.9%        Objective:     Vital Signs (Most Recent):  Temp: 97.2 °F (36.2 °C) (01/24/18 0805)  Pulse: 66 (01/24/18 0805)  Resp: 16 (01/24/18 0805)  BP: (!) 109/58 (01/24/18 0805)  SpO2: 98 % (01/24/18 0805) Vital Signs (24h Range):  Temp:  [97 °F (36.1 °C)-98.4 °F (36.9 °C)] 97.2 °F (36.2 °C)  Pulse:  [56-80] 66  Resp:  [14-18] 16  SpO2:  [87 %-99 %] 98 %  BP: (100-125)/(57-68) 109/58     Intake/Output - Last 3 Shifts       01/22 0700 - 01/23 0659 01/23 0700 - 01/24 0659 01/24 0700 - 01/25 0659    P.O. 600 120     I.V. (mL/kg)  300 (4.9)     IV Piggyback 200 1000      720     Total Intake(mL/kg) 1400 (22.9) 2140 (35)     Urine (mL/kg/hr) 750 (0.5) 2650 (1.8)     Emesis/NG output 200 (0.1) 0 (0)     Drains 0 (0)      Stool  0 (0)     Blood        Total Output 950 2650      Net +450 -510             Urine Occurrence 1250 x      Stool Occurrence  0 x     Emesis Occurrence  0 x           Physical Exam   Constitutional: He is oriented to person, place, and time. He appears well-developed. No distress.   HENT:   Head: Normocephalic and atraumatic.   Eyes: EOM are normal.   Neck: Normal range of motion.   Cardiovascular: Normal rate and intact distal pulses.    Pulmonary/Chest: Effort normal. No respiratory distress.   Abdominal: Soft. He exhibits no distension. There is tenderness. There is no rebound and no guarding.   Midline incision c/d/i, penrose in place in RLQ tracking to R flank,    Musculoskeletal: Normal range of motion.   Neurological: He is alert and oriented to person, place, and time.   Skin: Skin is warm and dry.   Psychiatric: He has a normal mood and affect.     Significant Labs:  BMP (Last 3 Results):     Recent Labs  Lab 01/22/18  0531 01/23/18  0338 01/24/18  0433   GLU 98 117* 84    138 139   K 4.2 4.2 4.4    105 105   CO2 26 26 26   BUN 20 23* 23*   CREATININE 1.1 0.9 0.8   CALCIUM 8.5* 8.7 8.8   MG 2.0 1.9 1.9     CBC (Last 3 Results):     Recent Labs  Lab 01/22/18  0531 01/23/18  0338 01/24/18  0433   WBC 6.18 7.62 7.24   RBC 2.90* 3.40* 3.46*   HGB 6.8* 8.2* 8.6*   HCT 22.0* 25.7* 26.9*    278 285   MCV 76* 76* 78*   MCH 23.4* 24.1* 24.9*   MCHC 30.9* 31.9* 32.0     CRP (Last 3 Results):     Recent Labs  Lab 01/22/18  0531 01/23/18  0338 01/24/18  0433   CRP 72.7* 47.2* 26.8*     Assessment/Plan:     * Enterocutaneous fistula    42yoM now 6 Days Post-Op from ex lap, JS, takedown ECF.    -Sips of clears   -Continue TPN   -Pain/nausea meds as needed.  -OOB/IS.  -Ambulate in hallway TID  -DVT ppx        Malnutrition    Cont TPN              Michelle Ng NP  Colorectal Surgery  [facility]

## 2018-01-25 LAB
ALBUMIN SERPL BCP-MCNC: 2.6 G/DL
ALP SERPL-CCNC: 129 U/L
ALT SERPL W/O P-5'-P-CCNC: 9 U/L
ANION GAP SERPL CALC-SCNC: 7 MMOL/L
AST SERPL-CCNC: 12 U/L
BASOPHILS # BLD AUTO: 0.04 K/UL
BASOPHILS NFR BLD: 0.5 %
BILIRUB SERPL-MCNC: 0.3 MG/DL
BUN SERPL-MCNC: 25 MG/DL
CALCIUM SERPL-MCNC: 9.2 MG/DL
CHLORIDE SERPL-SCNC: 101 MMOL/L
CO2 SERPL-SCNC: 27 MMOL/L
CREAT SERPL-MCNC: 0.9 MG/DL
CRP SERPL-MCNC: 18.8 MG/L
DIFFERENTIAL METHOD: ABNORMAL
EOSINOPHIL # BLD AUTO: 0.4 K/UL
EOSINOPHIL NFR BLD: 5.4 %
ERYTHROCYTE [DISTWIDTH] IN BLOOD BY AUTOMATED COUNT: 17.4 %
EST. GFR  (AFRICAN AMERICAN): >60 ML/MIN/1.73 M^2
EST. GFR  (NON AFRICAN AMERICAN): >60 ML/MIN/1.73 M^2
GLUCOSE SERPL-MCNC: 96 MG/DL
HCT VFR BLD AUTO: 28.5 %
HGB BLD-MCNC: 9.1 G/DL
IMM GRANULOCYTES # BLD AUTO: 0.04 K/UL
IMM GRANULOCYTES NFR BLD AUTO: 0.5 %
LYMPHOCYTES # BLD AUTO: 1.5 K/UL
LYMPHOCYTES NFR BLD: 20.4 %
MAGNESIUM SERPL-MCNC: 2.1 MG/DL
MCH RBC QN AUTO: 25.1 PG
MCHC RBC AUTO-ENTMCNC: 31.9 G/DL
MCV RBC AUTO: 79 FL
MONOCYTES # BLD AUTO: 0.8 K/UL
MONOCYTES NFR BLD: 10.9 %
NEUTROPHILS # BLD AUTO: 4.6 K/UL
NEUTROPHILS NFR BLD: 62.3 %
NRBC BLD-RTO: 0 /100 WBC
PHOSPHATE SERPL-MCNC: 4.4 MG/DL
PLATELET # BLD AUTO: 315 K/UL
PMV BLD AUTO: 9.7 FL
POTASSIUM SERPL-SCNC: 4.7 MMOL/L
PROT SERPL-MCNC: 7.6 G/DL
RBC # BLD AUTO: 3.63 M/UL
SODIUM SERPL-SCNC: 135 MMOL/L
WBC # BLD AUTO: 7.37 K/UL

## 2018-01-25 PROCEDURE — 25000003 PHARM REV CODE 250: Performed by: COLON & RECTAL SURGERY

## 2018-01-25 PROCEDURE — B4185 PARENTERAL SOL 10 GM LIPIDS: HCPCS | Performed by: STUDENT IN AN ORGANIZED HEALTH CARE EDUCATION/TRAINING PROGRAM

## 2018-01-25 PROCEDURE — 86140 C-REACTIVE PROTEIN: CPT

## 2018-01-25 PROCEDURE — 63600175 PHARM REV CODE 636 W HCPCS: Performed by: SURGERY

## 2018-01-25 PROCEDURE — 80053 COMPREHEN METABOLIC PANEL: CPT

## 2018-01-25 PROCEDURE — 25000003 PHARM REV CODE 250: Performed by: STUDENT IN AN ORGANIZED HEALTH CARE EDUCATION/TRAINING PROGRAM

## 2018-01-25 PROCEDURE — A4216 STERILE WATER/SALINE, 10 ML: HCPCS | Performed by: COLON & RECTAL SURGERY

## 2018-01-25 PROCEDURE — 63600175 PHARM REV CODE 636 W HCPCS: Performed by: NURSE PRACTITIONER

## 2018-01-25 PROCEDURE — C9113 INJ PANTOPRAZOLE SODIUM, VIA: HCPCS | Performed by: SURGERY

## 2018-01-25 PROCEDURE — 63600175 PHARM REV CODE 636 W HCPCS: Performed by: STUDENT IN AN ORGANIZED HEALTH CARE EDUCATION/TRAINING PROGRAM

## 2018-01-25 PROCEDURE — 25000003 PHARM REV CODE 250: Performed by: SURGERY

## 2018-01-25 PROCEDURE — 25000003 PHARM REV CODE 250: Performed by: NURSE PRACTITIONER

## 2018-01-25 PROCEDURE — 84100 ASSAY OF PHOSPHORUS: CPT

## 2018-01-25 PROCEDURE — 85025 COMPLETE CBC W/AUTO DIFF WBC: CPT

## 2018-01-25 PROCEDURE — A4217 STERILE WATER/SALINE, 500 ML: HCPCS | Performed by: NURSE PRACTITIONER

## 2018-01-25 PROCEDURE — 83735 ASSAY OF MAGNESIUM: CPT

## 2018-01-25 PROCEDURE — 11000001 HC ACUTE MED/SURG PRIVATE ROOM

## 2018-01-25 RX ORDER — HYDROCODONE BITARTRATE AND ACETAMINOPHEN 7.5; 325 MG/1; MG/1
1 TABLET ORAL EVERY 6 HOURS PRN
Status: DISCONTINUED | OUTPATIENT
Start: 2018-01-25 | End: 2018-01-26

## 2018-01-25 RX ORDER — SIMETHICONE 80 MG
1 TABLET,CHEWABLE ORAL 3 TIMES DAILY PRN
Status: DISCONTINUED | OUTPATIENT
Start: 2018-01-25 | End: 2018-01-28 | Stop reason: HOSPADM

## 2018-01-25 RX ORDER — DIPHENHYDRAMINE HYDROCHLORIDE 50 MG/ML
25 INJECTION INTRAMUSCULAR; INTRAVENOUS EVERY 4 HOURS PRN
Status: DISCONTINUED | OUTPATIENT
Start: 2018-01-25 | End: 2018-01-28 | Stop reason: HOSPADM

## 2018-01-25 RX ADMIN — IBUPROFEN 400 MG: 800 INJECTION INTRAVENOUS at 05:01

## 2018-01-25 RX ADMIN — GABAPENTIN 100 MG: 100 CAPSULE ORAL at 09:01

## 2018-01-25 RX ADMIN — IBUPROFEN 400 MG: 800 INJECTION INTRAVENOUS at 10:01

## 2018-01-25 RX ADMIN — OXYCODONE HYDROCHLORIDE 10 MG: 5 TABLET ORAL at 01:01

## 2018-01-25 RX ADMIN — MEPERIDINE HYDROCHLORIDE 40 MG: 50 INJECTION INTRAMUSCULAR; INTRAVENOUS; SUBCUTANEOUS at 08:01

## 2018-01-25 RX ADMIN — IBUPROFEN 400 MG: 800 INJECTION INTRAVENOUS at 02:01

## 2018-01-25 RX ADMIN — HYDROCODONE BITARTRATE AND ACETAMINOPHEN 1 TABLET: 7.5; 325 TABLET ORAL at 04:01

## 2018-01-25 RX ADMIN — CALCIUM GLUCONATE: 94 INJECTION, SOLUTION INTRAVENOUS at 10:01

## 2018-01-25 RX ADMIN — SODIUM CHLORIDE 1000 ML: 0.9 INJECTION, SOLUTION INTRAVENOUS at 11:01

## 2018-01-25 RX ADMIN — Medication 10 ML: at 12:01

## 2018-01-25 RX ADMIN — Medication 10 ML: at 05:01

## 2018-01-25 RX ADMIN — MEPERIDINE HYDROCHLORIDE 40 MG: 50 INJECTION INTRAMUSCULAR; INTRAVENOUS; SUBCUTANEOUS at 12:01

## 2018-01-25 RX ADMIN — SOYBEAN OIL 250 ML: 20 INJECTION, SOLUTION INTRAVENOUS at 10:01

## 2018-01-25 RX ADMIN — OXYCODONE HYDROCHLORIDE 10 MG: 5 TABLET ORAL at 05:01

## 2018-01-25 RX ADMIN — MEPERIDINE HYDROCHLORIDE 40 MG: 50 INJECTION INTRAMUSCULAR; INTRAVENOUS; SUBCUTANEOUS at 03:01

## 2018-01-25 RX ADMIN — ENOXAPARIN SODIUM 40 MG: 100 INJECTION SUBCUTANEOUS at 04:01

## 2018-01-25 RX ADMIN — ONDANSETRON 4 MG: 2 INJECTION INTRAMUSCULAR; INTRAVENOUS at 12:01

## 2018-01-25 RX ADMIN — GABAPENTIN 100 MG: 100 CAPSULE ORAL at 05:01

## 2018-01-25 RX ADMIN — PANTOPRAZOLE SODIUM 40 MG: 40 INJECTION, POWDER, FOR SOLUTION INTRAVENOUS at 08:01

## 2018-01-25 RX ADMIN — HYDROCODONE BITARTRATE AND ACETAMINOPHEN 1 TABLET: 7.5; 325 TABLET ORAL at 10:01

## 2018-01-25 RX ADMIN — MEPERIDINE HYDROCHLORIDE 40 MG: 50 INJECTION INTRAMUSCULAR; INTRAVENOUS; SUBCUTANEOUS at 05:01

## 2018-01-25 RX ADMIN — GABAPENTIN 100 MG: 100 CAPSULE ORAL at 02:01

## 2018-01-25 RX ADMIN — MEPERIDINE HYDROCHLORIDE 40 MG: 50 INJECTION INTRAMUSCULAR; INTRAVENOUS; SUBCUTANEOUS at 09:01

## 2018-01-25 NOTE — PROGRESS NOTES
[facility]  Colorectal Surgery  Progress Note    Patient Name: Pedro Nelson  MRN: [id number]  Admission Date: 1/10/2018  Hospital Length of Stay: 15 days  Attending Physician: OKSANA Upton MD    Subjective:     Interval History:     NAEON. Pain stable. Denies nausea or vomiting. No BM or flatus yet but feels a good amount of rumbling in his abdomen. Ambulating. Voiding.     Post-Op Info:  Procedure(s) (LRB):  EXPLORATORY-LAPAROTOMY (N/A)  CATHETERIZATION-URETHRAL (Right)  LYSIS-ADHESION EXTENSIVE- LASTING MORE THAN 2 HRS  REPAIR-FISTULA-ENTEROCUTANEOUS WITH RESECTION  ENTERECTOMY  WRAP-OMENTAL   7 Days Post-Op      Medications:  Continuous Infusions:   TPN ADULT CENTRAL LINE CUSTOM 60 mL/hr at 01/24/18 2132     Scheduled Meds:   alteplase  2 mg Intra-Catheter Weekly    cyanocobalamin  1,000 mcg Intramuscular Q30 Days    enoxaparin  40 mg Subcutaneous Daily    fat emulsion 20%  250 mL Intravenous Daily    gabapentin  100 mg Oral TID    ibuprofen  400 mg Intravenous Q8H    pantoprazole  40 mg Intravenous Daily    sodium chloride 0.9%  10 mL Intravenous Q6H     PRN Meds:   sodium chloride    alteplase    barium    barium    diphenhydrAMINE    hydrocodone-acetaminophen 7.5-325mg    meperidine    naloxone    omnipaque    ondansetron    promethazine (PHENERGAN) IVPB    ramelteon    simethicone    sodium chloride 0.9%        Objective:     Vital Signs (Most Recent):  Temp: 96.7 °F (35.9 °C) (01/25/18 0823)  Pulse: 61 (01/25/18 0823)  Resp: 18 (01/25/18 0823)  BP: 117/72 (01/25/18 0823)  SpO2: 96 % (01/25/18 0823) Vital Signs (24h Range):  Temp:  [96.7 °F (35.9 °C)-98 °F (36.7 °C)] 96.7 °F (35.9 °C)  Pulse:  [60-69] 61  Resp:  [14-18] 18  SpO2:  [96 %-98 %] 96 %  BP: (103-123)/(58-72) 117/72     Intake/Output - Last 3 Shifts       01/23 0700 - 01/24 0659 01/24 0700 - 01/25 0659 01/25 0700 - 01/26 0659    P.O. 120 120     I.V. (mL/kg) 300 (4.9) 367 (6)     IV Piggyback 1000 100            Total Intake(mL/kg) 2140 (35) 587 (9.6)     Urine (mL/kg/hr) 2650 (1.8) 750 (0.5)     Emesis/NG output 0 (0)      Drains       Stool 0 (0)      Total Output 2650 750      Net -510 -163             Stool Occurrence 0 x      Emesis Occurrence 0 x            Physical Exam   Constitutional: He is oriented to person, place, and time. He appears well-developed. No distress.   HENT:   Head: Normocephalic and atraumatic.   Eyes: EOM are normal.   Neck: Normal range of motion.   Cardiovascular: Normal rate and intact distal pulses.    Pulmonary/Chest: Effort normal. No respiratory distress.   Abdominal: Soft. He exhibits no distension. There is tenderness. There is no rebound and no guarding.   Midline incision c/d/i, penrose in place in RLQ tracking to R flank,    Musculoskeletal: Normal range of motion.   Neurological: He is alert and oriented to person, place, and time.   Skin: Skin is warm and dry.   Psychiatric: He has a normal mood and affect.     Significant Labs:  BMP (Last 3 Results):   Recent Labs  Lab 01/23/18  0338 01/24/18  0433 01/25/18  0531   * 84 96    139 135*   K 4.2 4.4 4.7    105 101   CO2 26 26 27   BUN 23* 23* 25*   CREATININE 0.9 0.8 0.9   CALCIUM 8.7 8.8 9.2   MG 1.9 1.9 2.1     CBC (Last 3 Results):   Recent Labs  Lab 01/23/18  0338 01/24/18  0433 01/25/18  0531   WBC 7.62 7.24 7.37   RBC 3.40* 3.46* 3.63*   HGB 8.2* 8.6* 9.1*   HCT 25.7* 26.9* 28.5*    285 315   MCV 76* 78* 79*   MCH 24.1* 24.9* 25.1*   MCHC 31.9* 32.0 31.9*     Prealbumin (Last 3 Results):   Recent Labs  Lab 01/22/18  0531   PREALBUMIN 11*     Assessment/Plan:     * Enterocutaneous fistula    42yoM now 7 Days Post-Op from ex lap, JS, takedown ECF.    -Allow some sips of FLD. Supplement with Boost.   -Continue TPN   -Pain/nausea meds as needed.  -Will start to back out Penrose drain.  -OOB/IS. Ambulate in hallway TID.  -DVT ppx        Malnutrition    Cont TPN              Gagandeep Gordillo,  MD  Colorectal Surgery  [facility]

## 2018-01-25 NOTE — SUBJECTIVE & OBJECTIVE
Subjective:     Interval History:     NAEON. Pain stable. Denies nausea or vomiting. No BM or flatus yet but feels a good amount of rumbling in his abdomen. Ambulating. Voiding.     Post-Op Info:  Procedure(s) (LRB):  EXPLORATORY-LAPAROTOMY (N/A)  CATHETERIZATION-URETHRAL (Right)  LYSIS-ADHESION EXTENSIVE- LASTING MORE THAN 2 HRS  REPAIR-FISTULA-ENTEROCUTANEOUS WITH RESECTION  ENTERECTOMY  WRAP-OMENTAL   7 Days Post-Op      Medications:  Continuous Infusions:   TPN ADULT CENTRAL LINE CUSTOM 60 mL/hr at 01/24/18 2132     Scheduled Meds:   alteplase  2 mg Intra-Catheter Weekly    cyanocobalamin  1,000 mcg Intramuscular Q30 Days    enoxaparin  40 mg Subcutaneous Daily    fat emulsion 20%  250 mL Intravenous Daily    gabapentin  100 mg Oral TID    ibuprofen  400 mg Intravenous Q8H    pantoprazole  40 mg Intravenous Daily    sodium chloride 0.9%  10 mL Intravenous Q6H     PRN Meds:   sodium chloride    alteplase    barium    barium    diphenhydrAMINE    hydrocodone-acetaminophen 7.5-325mg    meperidine    naloxone    omnipaque    ondansetron    promethazine (PHENERGAN) IVPB    ramelteon    simethicone    sodium chloride 0.9%        Objective:     Vital Signs (Most Recent):  Temp: 96.7 °F (35.9 °C) (01/25/18 0823)  Pulse: 61 (01/25/18 0823)  Resp: 18 (01/25/18 0823)  BP: 117/72 (01/25/18 0823)  SpO2: 96 % (01/25/18 0823) Vital Signs (24h Range):  Temp:  [96.7 °F (35.9 °C)-98 °F (36.7 °C)] 96.7 °F (35.9 °C)  Pulse:  [60-69] 61  Resp:  [14-18] 18  SpO2:  [96 %-98 %] 96 %  BP: (103-123)/(58-72) 117/72     Intake/Output - Last 3 Shifts       01/23 0700 - 01/24 0659 01/24 0700 - 01/25 0659 01/25 0700 - 01/26 0659    P.O. 120 120     I.V. (mL/kg) 300 (4.9) 367 (6)     IV Piggyback 1000 100           Total Intake(mL/kg) 2140 (35) 587 (9.6)     Urine (mL/kg/hr) 2650 (1.8) 750 (0.5)     Emesis/NG output 0 (0)      Drains       Stool 0 (0)      Total Output 2650 750      Net -379 -168              Stool Occurrence 0 x      Emesis Occurrence 0 x            Physical Exam   Constitutional: He is oriented to person, place, and time. He appears well-developed. No distress.   HENT:   Head: Normocephalic and atraumatic.   Eyes: EOM are normal.   Neck: Normal range of motion.   Cardiovascular: Normal rate and intact distal pulses.    Pulmonary/Chest: Effort normal. No respiratory distress.   Abdominal: Soft. He exhibits no distension. There is tenderness. There is no rebound and no guarding.   Midline incision c/d/i, penrose in place in RLQ tracking to R flank,    Musculoskeletal: Normal range of motion.   Neurological: He is alert and oriented to person, place, and time.   Skin: Skin is warm and dry.   Psychiatric: He has a normal mood and affect.     Significant Labs:  BMP (Last 3 Results):   Recent Labs  Lab 01/23/18 0338 01/24/18  0433 01/25/18  0531   * 84 96    139 135*   K 4.2 4.4 4.7    105 101   CO2 26 26 27   BUN 23* 23* 25*   CREATININE 0.9 0.8 0.9   CALCIUM 8.7 8.8 9.2   MG 1.9 1.9 2.1     CBC (Last 3 Results):   Recent Labs  Lab 01/23/18  0338 01/24/18  0433 01/25/18  0531   WBC 7.62 7.24 7.37   RBC 3.40* 3.46* 3.63*   HGB 8.2* 8.6* 9.1*   HCT 25.7* 26.9* 28.5*    285 315   MCV 76* 78* 79*   MCH 24.1* 24.9* 25.1*   MCHC 31.9* 32.0 31.9*     Prealbumin (Last 3 Results):   Recent Labs  Lab 01/22/18  0531   PREALBUMIN 11*

## 2018-01-25 NOTE — PLAN OF CARE
SW spoke with MD about d/c planning.  MD informed SW that pt will possibly d/c home with HH and TPN.  SW will speak with pt about d/c plan.  SW will continue to follow for d/c instructions.               Aron Gibbs, LMSW [facility]  [id number]

## 2018-01-25 NOTE — ASSESSMENT & PLAN NOTE
42yoM now 7 Days Post-Op from ex lap, JS, takedown ECF.    -Allow some sips of FLD. Supplement with Boost.   -Continue TPN   -Pain/nausea meds as needed.  -Will start to back out Penrose drain.  -OOB/IS. Ambulate in hallway TID.  -DVT ppx

## 2018-01-25 NOTE — PROGRESS NOTES
Follow up on RLQ  Midline remains intact. Pt states they are advancing the Penrose drain in his RLQ . Denies any needs from wound care at this time.   Following form afar.  Kelin Cadena RN CWON  [id number]

## 2018-01-26 LAB
ALBUMIN SERPL BCP-MCNC: 2.5 G/DL
ALP SERPL-CCNC: 150 U/L
ALT SERPL W/O P-5'-P-CCNC: 10 U/L
ANION GAP SERPL CALC-SCNC: 10 MMOL/L
AST SERPL-CCNC: 16 U/L
BASOPHILS # BLD AUTO: 0.03 K/UL
BASOPHILS NFR BLD: 0.4 %
BILIRUB SERPL-MCNC: 0.3 MG/DL
BUN SERPL-MCNC: 26 MG/DL
CALCIUM SERPL-MCNC: 9.1 MG/DL
CHLORIDE SERPL-SCNC: 103 MMOL/L
CO2 SERPL-SCNC: 23 MMOL/L
CREAT SERPL-MCNC: 0.8 MG/DL
CRP SERPL-MCNC: 14.2 MG/L
DIFFERENTIAL METHOD: ABNORMAL
EOSINOPHIL # BLD AUTO: 0.3 K/UL
EOSINOPHIL NFR BLD: 4.6 %
ERYTHROCYTE [DISTWIDTH] IN BLOOD BY AUTOMATED COUNT: 17.6 %
EST. GFR  (AFRICAN AMERICAN): >60 ML/MIN/1.73 M^2
EST. GFR  (NON AFRICAN AMERICAN): >60 ML/MIN/1.73 M^2
GLUCOSE SERPL-MCNC: 95 MG/DL
HCT VFR BLD AUTO: 29.2 %
HGB BLD-MCNC: 9.4 G/DL
IMM GRANULOCYTES # BLD AUTO: 0.02 K/UL
IMM GRANULOCYTES NFR BLD AUTO: 0.3 %
LYMPHOCYTES # BLD AUTO: 1.6 K/UL
LYMPHOCYTES NFR BLD: 23.4 %
MAGNESIUM SERPL-MCNC: 2.1 MG/DL
MCH RBC QN AUTO: 24.7 PG
MCHC RBC AUTO-ENTMCNC: 32.2 G/DL
MCV RBC AUTO: 77 FL
MONOCYTES # BLD AUTO: 0.8 K/UL
MONOCYTES NFR BLD: 11.4 %
NEUTROPHILS # BLD AUTO: 4.1 K/UL
NEUTROPHILS NFR BLD: 59.9 %
NRBC BLD-RTO: 0 /100 WBC
PHOSPHATE SERPL-MCNC: 4.5 MG/DL
PLATELET # BLD AUTO: 308 K/UL
PMV BLD AUTO: 9.2 FL
POTASSIUM SERPL-SCNC: 4.6 MMOL/L
PREALB SERPL-MCNC: 27 MG/DL
PROT SERPL-MCNC: 7.8 G/DL
RBC # BLD AUTO: 3.8 M/UL
SODIUM SERPL-SCNC: 136 MMOL/L
WBC # BLD AUTO: 6.91 K/UL

## 2018-01-26 PROCEDURE — 97116 GAIT TRAINING THERAPY: CPT

## 2018-01-26 PROCEDURE — 25000003 PHARM REV CODE 250: Performed by: NURSE PRACTITIONER

## 2018-01-26 PROCEDURE — 84100 ASSAY OF PHOSPHORUS: CPT

## 2018-01-26 PROCEDURE — 86140 C-REACTIVE PROTEIN: CPT

## 2018-01-26 PROCEDURE — 25000003 PHARM REV CODE 250: Performed by: COLON & RECTAL SURGERY

## 2018-01-26 PROCEDURE — 11000001 HC ACUTE MED/SURG PRIVATE ROOM

## 2018-01-26 PROCEDURE — 63600175 PHARM REV CODE 636 W HCPCS: Performed by: SURGERY

## 2018-01-26 PROCEDURE — A4216 STERILE WATER/SALINE, 10 ML: HCPCS | Performed by: COLON & RECTAL SURGERY

## 2018-01-26 PROCEDURE — 83735 ASSAY OF MAGNESIUM: CPT

## 2018-01-26 PROCEDURE — 85025 COMPLETE CBC W/AUTO DIFF WBC: CPT

## 2018-01-26 PROCEDURE — B4185 PARENTERAL SOL 10 GM LIPIDS: HCPCS | Performed by: NURSE PRACTITIONER

## 2018-01-26 PROCEDURE — 84134 ASSAY OF PREALBUMIN: CPT

## 2018-01-26 PROCEDURE — 80053 COMPREHEN METABOLIC PANEL: CPT

## 2018-01-26 PROCEDURE — 63600175 PHARM REV CODE 636 W HCPCS: Performed by: NURSE PRACTITIONER

## 2018-01-26 PROCEDURE — 63600175 PHARM REV CODE 636 W HCPCS: Performed by: STUDENT IN AN ORGANIZED HEALTH CARE EDUCATION/TRAINING PROGRAM

## 2018-01-26 PROCEDURE — C9113 INJ PANTOPRAZOLE SODIUM, VIA: HCPCS | Performed by: SURGERY

## 2018-01-26 PROCEDURE — A4217 STERILE WATER/SALINE, 500 ML: HCPCS | Performed by: NURSE PRACTITIONER

## 2018-01-26 PROCEDURE — 25000003 PHARM REV CODE 250: Performed by: SURGERY

## 2018-01-26 PROCEDURE — 25000003 PHARM REV CODE 250: Performed by: STUDENT IN AN ORGANIZED HEALTH CARE EDUCATION/TRAINING PROGRAM

## 2018-01-26 RX ORDER — HYDROCODONE BITARTRATE AND ACETAMINOPHEN 7.5; 325 MG/1; MG/1
1 TABLET ORAL EVERY 4 HOURS PRN
Status: DISCONTINUED | OUTPATIENT
Start: 2018-01-26 | End: 2018-01-28 | Stop reason: HOSPADM

## 2018-01-26 RX ADMIN — HYDROCODONE BITARTRATE AND ACETAMINOPHEN 1 TABLET: 7.5; 325 TABLET ORAL at 11:01

## 2018-01-26 RX ADMIN — MEPERIDINE HYDROCHLORIDE 40 MG: 50 INJECTION INTRAMUSCULAR; INTRAVENOUS; SUBCUTANEOUS at 07:01

## 2018-01-26 RX ADMIN — GABAPENTIN 100 MG: 100 CAPSULE ORAL at 02:01

## 2018-01-26 RX ADMIN — ENOXAPARIN SODIUM 40 MG: 100 INJECTION SUBCUTANEOUS at 04:01

## 2018-01-26 RX ADMIN — GABAPENTIN 100 MG: 100 CAPSULE ORAL at 05:01

## 2018-01-26 RX ADMIN — Medication 10 ML: at 12:01

## 2018-01-26 RX ADMIN — HYDROCODONE BITARTRATE AND ACETAMINOPHEN 1 TABLET: 7.5; 325 TABLET ORAL at 09:01

## 2018-01-26 RX ADMIN — IBUPROFEN 400 MG: 800 INJECTION INTRAVENOUS at 09:01

## 2018-01-26 RX ADMIN — MEPERIDINE HYDROCHLORIDE 40 MG: 50 INJECTION INTRAMUSCULAR; INTRAVENOUS; SUBCUTANEOUS at 06:01

## 2018-01-26 RX ADMIN — IBUPROFEN 400 MG: 800 INJECTION INTRAVENOUS at 05:01

## 2018-01-26 RX ADMIN — MEPERIDINE HYDROCHLORIDE: 50 INJECTION INTRAMUSCULAR; INTRAVENOUS; SUBCUTANEOUS at 01:01

## 2018-01-26 RX ADMIN — HYDROCODONE BITARTRATE AND ACETAMINOPHEN 1 TABLET: 7.5; 325 TABLET ORAL at 05:01

## 2018-01-26 RX ADMIN — MEPERIDINE HYDROCHLORIDE 40 MG: 50 INJECTION INTRAMUSCULAR; INTRAVENOUS; SUBCUTANEOUS at 01:01

## 2018-01-26 RX ADMIN — CALCIUM GLUCONATE: 94 INJECTION, SOLUTION INTRAVENOUS at 11:01

## 2018-01-26 RX ADMIN — IBUPROFEN 400 MG: 800 INJECTION INTRAVENOUS at 01:01

## 2018-01-26 RX ADMIN — ONDANSETRON 4 MG: 2 INJECTION INTRAMUSCULAR; INTRAVENOUS at 12:01

## 2018-01-26 RX ADMIN — Medication 10 ML: at 06:01

## 2018-01-26 RX ADMIN — HYDROCODONE BITARTRATE AND ACETAMINOPHEN 1 TABLET: 7.5; 325 TABLET ORAL at 04:01

## 2018-01-26 RX ADMIN — HYDROCODONE BITARTRATE AND ACETAMINOPHEN 1 TABLET: 7.5; 325 TABLET ORAL at 12:01

## 2018-01-26 RX ADMIN — PANTOPRAZOLE SODIUM 40 MG: 40 INJECTION, POWDER, FOR SOLUTION INTRAVENOUS at 09:01

## 2018-01-26 RX ADMIN — GABAPENTIN 100 MG: 100 CAPSULE ORAL at 09:01

## 2018-01-26 RX ADMIN — SOYBEAN OIL 250 ML: 20 INJECTION, SOLUTION INTRAVENOUS at 11:01

## 2018-01-26 NOTE — PLAN OF CARE
Problem: Nutrition, Parenteral (Adult)  Intervention: Monitor/Manage Parenteral Nutrition Support    Recommendations     Recommendation/Intervention:   1. Recommend modifying current TPN order to custom TPN of 5% AA/20% Dextrose at 80 mL/hr + IV lipids daily - to provide 2190 kcal/day, 96 g protein/day, and 1920 mL fluid/day (GIR = 4.37 mg/kg/min).  2. Continue current Low Fiber/Residue with texture per SLP. Encourage PO intake as tolerated.  3. RD following.     Goals: Patient to meet > 85% EEN and EPN  Nutrition Goal Status: goal met

## 2018-01-26 NOTE — PLAN OF CARE
Problem: Physical Therapy Goal  Goal: Physical Therapy Goal  Goals to be met by: 2/3/2018     Patient will increase functional independence with mobility by performin. Supine to sit with Modified [place] - met  2. Sit to supine with Modified [place] - met  3. Sit to stand transfer with Supervision - met  4. Bed to chair transfer with Supervision. - not met  5. Gait  x 200 feet with Supervision. - not met  6. Lower extremity exercise program x 15 reps per handout, with supervision - not met     Pt will continue to benefit from skilled PT to progress towards reaching goals.   GURDEEP Olivera   2018  I certify that I was present in the room directing the student in service delivery and guiding them using my skilled judgment. As the co-signing therapist I have reviewed the students documentation and am responsible for the treatment, assessment, and plan. Oziel Ly PTA  2018

## 2018-01-26 NOTE — PLAN OF CARE
Update 11:40 AM  SW spoke with [facility] admission rep Dafne Betancur ) [phone] who informed SW that the agency would require orders for HH services from a physician in the state of [place] to provide services.  SW spoke to the pt about this issue.  Pt informed SW that he will contact his MD in [place] for assistance.  SW informed NP of this issue.  SW will continue to follow for discharge instructions.     10:15am  SW met with pt to discuss d/c planning.  SW discussed HH recommendation with pt.  Pt informed SW that he was receiving services with [facility] in the [facility] and would like to resume services with this agency.  SW will send referral for services to the HH agecny of the pt choice when orders are written.           Aron Gibbs, LMSW [facility]  [id number]     72 y/o M w/ no significant PMHx p/w R shoulder pain and R hip pain onset tody s/p fall. Pt reports falling down 3 stairs in home. Pt denies fever, numbness, tingling, weakness, LOC, nausea, vomiting, or any other complaint. NKDA. PMD: Dr. Cárdenas. Tetanus not UTD. 74 y/o M w/ no significant PMHx p/w R shoulder pain and R hip pain onset tody s/p fall. Pt reports falling down 3 stairs in home. Denies any head trauma. Pt denies fever, numbness, tingling, weakness, LOC, headache, neck pain, nausea, vomiting, or any other complaint. NKDA. PMD: Dr. Cárdenas. Tetanus not UTD.

## 2018-01-26 NOTE — PLAN OF CARE
01/26/18 1236   Discharge Reassessment   Assessment Type Discharge Planning Reassessment   Provided patient/caregiver education on the expected discharge date and the discharge plan No   Do you have any problems affording any of your prescribed medications? No   Discharge Plan A Home with family   Discharge Plan B Home with family;Home Health   Patient choice form signed by patient/caregiver N/A   Can the patient answer the patient profile reliably? Yes, cognitively intact   How does the patient rate their overall health at the present time? Fair   Describe the patient's ability to walk at the present time. Minor restrictions or changes   How often would a person be available to care for the patient? Occasionally   During the past month, has the patient often been bothered by feeling down, depressed or hopeless? No   During the past month, has the patient often been bothered by little interest or pleasure in doing things? No

## 2018-01-26 NOTE — PT/OT/SLP PROGRESS
"Physical Therapy Treatment    Patient Name:  Pedro Nelson   MRN:  [id number]    Recommendations:     Discharge Recommendations:  home   Discharge Equipment Recommendations: none   Barriers to discharge: None    Assessment:     Pedro Nelson is a 42 y.o. male admitted with a medical diagnosis of Enterocutaneous fistula.  He presents with the following impairments/functional limitations:  pain, impaired muscle length, decreased ROM. Pt reports no dizziness, only pain in abdominal area. Pt was eager and willing to amb. Pt is safe to walk in hallway with family or nursing. Pt will continue to benefit from skilled PT to increase strength and functional mobility.     Rehab Prognosis:  Good; patient would benefit from acute skilled PT services to address these deficits and reach maximum level of function.      Recent Surgery: Procedure(s) (LRB):  EXPLORATORY-LAPAROTOMY (N/A)  CATHETERIZATION-URETHRAL (Right)  LYSIS-ADHESION EXTENSIVE- LASTING MORE THAN 2 HRS  REPAIR-FISTULA-ENTEROCUTANEOUS WITH RESECTION  ENTERECTOMY  WRAP-OMENTAL 8 Days Post-Op    Plan:     During this hospitalization, patient to be seen 2 x/week to address the above listed problems via gait training, therapeutic activities  · Plan of Care Expires:  02/19/18   Plan of Care Reviewed with: patient    Subjective     Communicated with RN prior to session.  Patient found supine upon PT entry to room, agreeable to treatment.      Patient comments/goals: "pain in stomach"  Pain/Comfort:  · Pain Rating 1: 6/10  · Location - Side 1: Bilateral  · Location - Orientation 1: generalized  · Location 1: abdomen  · Pain Addressed 1: Reposition, Distraction  · Pain Rating Post-Intervention 1:  (did not rate)    Patients cultural, spiritual, Jainism conflicts given the current situation: none stated    Objective:     Patient found with: peripheral IV     General Precautions: Standard, fall   Orthopedic Precautions:N/A   Braces: N/A     Functional Mobility:  · Bed " Mobility:     · Supine to Sit: modified independence  · Sit to Supine: modified independence  · Transfers:     · Sit to Stand:  modified independence with no AD  · Gait: Pt amb 350' with no AD and SBA. Pt demonstrated L ankle eversion, R lateral lean at trunk, forward flexed posture at hips. Pt states having multiple spine surgeries resulting in postural deviations/ mm shortening. Pt has normal speed and step length with no LOB.    · Balance: Good      AM-PAC 6 CLICK MOBILITY  Turning over in bed (including adjusting bedclothes, sheets and blankets)?: 4  Sitting down on and standing up from a chair with arms (e.g., wheelchair, bedside commode, etc.): 4  Moving from lying on back to sitting on the side of the bed?: 4  Moving to and from a bed to a chair (including a wheelchair)?: 4  Need to walk in hospital room?: 3  Climbing 3-5 steps with a railing?: 3  Total Score: 22       Therapeutic Activities and Exercises:    Pt is mod independent with bed mobility skills.   TherEx was not performed due to pt wanting to eat lunch.  Pt will continue with independent gait training in [place]  White board updated         Patient left supine with all lines intact and call button in reach..    GOALS:    Physical Therapy Goals        Problem: Physical Therapy Goal    Goal Priority Disciplines Outcome Goal Variances Interventions   Physical Therapy Goal     PT/OT, PT      Description:  Goals to be met by: 2/3/2018     Patient will increase functional independence with mobility by performin. Supine to sit with Modified [place] - met  2. Sit to supine with Modified [place] - met  3. Sit to stand transfer with Supervision - met  4. Bed to chair transfer with Supervision. - not met  5. Gait  x 200 feet with Supervision. - not met  6. Lower extremity exercise program x 15 reps per handout, with supervision - not met                       Time Tracking:     PT Received On: 18  PT Start Time: 1151     PT Stop Time:  1200  PT Total Time (min): 9 min     Billable Minutes: Gait Training 9    Treatment Type: Treatment  PT/PTA: PTA     PTA Visit Number: 1     Anna Marie GURDEEP Yeung  01/26/2018   I certify that I was present in the room directing the student in service delivery and guiding them using my skilled judgment. As the co-signing therapist I have reviewed the students documentation and am responsible for the treatment, assessment, and plan. Oziel Ly PTA  1/26/2018

## 2018-01-26 NOTE — PROGRESS NOTES
[facility]  Adult Nutrition  Progress Note    SUMMARY     Recommendations    Recommendation/Intervention:   1. Recommend modifying current TPN order to custom TPN of 5% AA/20% Dextrose at 80 mL/hr + IV lipids daily - to provide 2190 kcal/day, 96 g protein/day, and 1920 mL fluid/day (GIR = 4.37 mg/kg/min).  2. Continue current Low Fiber/Residue with texture per SLP. Encourage PO intake as tolerated.  3. RD following.    Goals: Patient to meet > 85% EEN and EPN  Nutrition Goal Status: goal met  Communication of RD Recs: reviewed with RN    Reason for Assessment    Reason for Assessment: RD follow-up  Diagnosis:  (EC fistula)  Relevent Medical History: Crohn's disease   Interdisciplinary Rounds: did not attend     General Information Comments: Diet advanced this am. Pt reports poor appetite at this time, ~25% of breakfast. Drinking Boost.    Nutrition Discharge Planning: Adequate nutrition via PO intake vs. TPN.    Nutrition Prescription Ordered    Current Diet Order: NPO     Current Nutrition Support Formula Ordered:  (Custom TPN: 140 gm AA, 240 gm dext)  Current Nutrition Support Rate Ordered: 60 (ml)  Current Nutrition Support Frequency Ordered: mL/hr  Oral Nutrition Supplement: Boost Plus TID    Evaluation of Received Nutrients/Fluid Intake                Parenteral Calories (kcal): 1376  Parenteral Protein (gm): 140  Parenteral Fluid (mL): 1440              Total Calories (kcal): 1876     Energy Calories Required: meeting needs  % Kcal Needs: 100              Protein Required: exceed needs  % Protein Needs: 152     IV Fluid (mL): 1000     GIR (Glucose Infusion Rate) (mg/kg/min): 2.73 mg/kg/min  Lipid Calories (kcals): 500 kcals  I/O: +1.5L since admit         Fluid Required: meeting needs  Comments: LBM 1/26  Tolerance: tolerating  % Intake of Estimated Energy Needs: 75 - 100 %  % Meal Intake: 25%     Nutrition Risk Screen     Nutrition Risk Screen: tube feeding or parenteral  "nutrition    Nutrition/Diet History       Typical Food/Fluid Intake: Patient on TPN PTA.  Food Preferences: No cultural/Scientologist needs identified at this time.        Factors Affecting Nutritional Intake: NPO, altered gastrointestinal function  Nutrition Support Formula Prior to Admit:  (Unsure of TPN formula)             Labs/Tests/Procedures/Meds       Pertinent Labs Reviewed: reviewed  Pertinent Labs Comments: BUN 26, Alb 2.5, CRP 14.2  Pertinent Medications Reviewed: reviewed  Pertinent Medications Comments: vit B12, pantoprazole, IVF    Physical Findings    Overall Physical Appearance: underweight, generalized wasting  Tubes:  (none)  Oral/Mouth Cavity: WDL  Skin: non-healing wound(s) (fistula x3)    Anthropometrics    Temp: 97.7 °F (36.5 °C)     Height: 6' 3" (190.5 cm)  Weight Method: Bed Scale  Weight: 61.1 kg (134 lb 11.2 oz)  Ideal Body Weight (IBW), Male: 196 lb     % Ideal Body Weight, Male (lb): 68.72 lb     BMI (Calculated): 16.9  BMI Grade: 16 - 16.9 protein-energy malnutrition grade II     Usual Body Weight (UBW), k.2 kg (2017 per chart review)     % Usual Body Weight: 101.71  % Weight Change From Usual Weight: 1.49 %             Estimated/Assessed Needs    Weight Used For Calorie Calculations: 61.1 kg (134 lb 11.2 oz)      Energy Calorie Requirements (kcal): 3885-8484 kcal/day  Energy Need Method: Kcal/kg (30-35 kcal/kg)      RMR (Elton-St. Jeor Equation): 1606.62        Weight Used For Protein Calculations: 61.1 kg (134 lb 11.2 oz)  Protein Requirements: 80-92 g/day (1.2-1.4 g/kg)  Fluid Requirements (mL): 1 mL/kcal or per MD  Fluid Need Method: RDA Method        RDA Method (mL): 1833               Assessment and Plan    * Enterocutaneous fistula    Nutrition Problem  Altered GI Function    Related to (etiology):   Crohn's disease and EC fistulas    Signs and Symptoms (as evidenced by):   NPO and need for TPN    Nutrition Diagnosis Status:   Continues              Monitor and " Evaluation    Food and Nutrient Intake: energy intake, parenteral nutrition intake  Food and Nutrient Adminstration: diet order, enteral and parenteral nutrition administration     Physical Activity and Function: nutrition-related ADLs and IADLs  Anthropometric Measurements: weight, weight change  Biochemical Data, Medical Tests and Procedures: electrolyte and renal panel, gastrointestinal profile, glucose/endocrine profile, inflammatory profile, lipid profile  Nutrition-Focused Physical Findings: overall appearance, skin    Nutrition Risk    Level of Risk:  (1x/week)    Nutrition Follow-Up    RD Follow-up?: Yes

## 2018-01-26 NOTE — PLAN OF CARE
[facility]    HOME HEALTH ORDERS  FACE TO FACE ENCOUNTER    Patient Name: Pedro Nelson  YOB: 1975    PCP: Primary Doctor No   PCP Address: None  PCP Phone Number: None  PCP Fax: None    Encounter Date: 01/26/2018    Admit to Home Health    Diagnoses:  Active Hospital Problems    Diagnosis  POA    *Enterocutaneous fistula [K63.2]  Yes    Malnutrition [E46]  Yes    Crohn's disease of ileum with fistula [K50.013]  Unknown      Resolved Hospital Problems    Diagnosis Date Resolved POA   No resolved problems to display.       No future appointments.        I have seen and examined this patient face to face today. My clinical findings that support the need for the home health skilled services and home bound status are the following:  Weakness/numbness causing balance and gait disturbance due to Surgery making it taxing to leave home.    Allergies:Review of patient's allergies indicates:  No Known Allergies    Diet: regular diet    Activities: no heavy lifting for 6 weeks    Nursing:   SN to complete comprehensive assessment including routine vital signs. Instruct on disease process and s/s of complications to report to MD. Review/verify medication list sent home with the patient at time of discharge  and instruct patient/caregiver as needed. Frequency may be adjusted depending on start of care date.    Notify MD if SBP > 160 or < 90; DBP > 90 or < 50; HR > 120 or < 50; Temp > 101; Other:         CONSULTS:     to evaluate for community resources/long-range planning.    MISCELLANEOUS CARE:  Home Infusion Therapy:   SN to perform Infusion Therapy/Central Line Care.  Review Central Line Care & Central Line Flush with patient.    Administer (drug and dose): TPN and Lipids  @ 60cc/hr  parenteral amino acid 15% No.2 15 % Solp 140 g 139.995 g   dextrose 70% Solp 240 g 240.002 g   sterile water Solp 53.94 mLs 53.94 mL   sodium acetate 2 mEq/mL Soln 30 mEq 30 mEq   sodium  chloride (23.4%) 4 mEq/mL Solp 110 mEq 110 mEq   potassium phosphate 3 mmol/mL Soln 30 mmol 30 mmol   potassium chloride 2 mEq/mL Soln 40 mEq 40 mEq   magnesium sulfate 4 mEq/mL (50 %) Soln 4 g 4 g   calcium gluconate 100 mg/mL (10%) Soln 8 mEq 1,720 mg   mvi, adult no.1 3,300 unit- 150 mcg/10 mL Soln 10 mLs 10 mL   trace elements [id number] [id number] -1000 mcg/mL Soln 1 mL 1 mL   thiamine 100 mg/mL Soln 100 mg 100 mg   folic acid 5 mg/mL Soln 1 mg 1 mg   Component Details       Last dose given: 1/26/18                         Home dose due: 1/27/18  For 2 months    Scrub the Hub: Prior to accessing the line, always perform a 30 second alcohol scrub  Each lumen of the central line is to be flushed at least daily with 10 mL Normal Saline and 3 mL Heparin flush (100 units/mL)  Skilled Nurse (SN) may draw blood from IV access  Blood Draw Procedure:   - Aspirate at least 5 mL of blood   - Discard   - Obtain specimen   - Change posiflow cap   - Flush with 20 mL Normal Saline followed by a                 3-5 mL Heparin flush (100 units/mL)  Central :   - Sterile dressing changes are done weekly and as needed.   - Use chlor-hexadine scrub to cleanse site, apply Biopatch to insertion site,       apply securement device dressing   - Posi-flow caps are changed weekly and after EVERY lab draw.   - If sterile gauze is under dressing to control oozing,                 dressing change must be performed every 24 hours until gauze is not needed.    LABS:  Weekly CBC, CMP, Mag, Phos   WOUND CARE ORDERS  n/a      Medications: Review discharge medications with patient and family and provide education.      Current Discharge Medication List      CONTINUE these medications which have NOT CHANGED    Details   HUA Allen injection Refills: 1      tramadol (ULTRAM) 50 mg tablet Take 50 mg by mouth every 6 (six) hours as needed for Pain.             I certify that this patient is confined to his home and needs  intermittent skilled nursing care.

## 2018-01-26 NOTE — SUBJECTIVE & OBJECTIVE
Subjective:     Interval History:     NAEON. Pain stable. Denies nausea or vomiting. + BM    Post-Op Info:  Procedure(s) (LRB):  EXPLORATORY-LAPAROTOMY (N/A)  CATHETERIZATION-URETHRAL (Right)  LYSIS-ADHESION EXTENSIVE- LASTING MORE THAN 2 HRS  REPAIR-FISTULA-ENTEROCUTANEOUS WITH RESECTION  ENTERECTOMY  WRAP-OMENTAL   8 Days Post-Op      Medications:  Continuous Infusions:   TPN ADULT CENTRAL LINE CUSTOM 60 mL/hr at 01/25/18 2235     Scheduled Meds:   alteplase  2 mg Intra-Catheter Weekly    cyanocobalamin  1,000 mcg Intramuscular Q30 Days    enoxaparin  40 mg Subcutaneous Daily    gabapentin  100 mg Oral TID    ibuprofen  400 mg Intravenous Q8H    pantoprazole  40 mg Intravenous Daily    sodium chloride 0.9%  10 mL Intravenous Q6H     PRN Meds:   sodium chloride    alteplase    barium    barium    diphenhydrAMINE    hydrocodone-acetaminophen 7.5-325mg    meperidine    naloxone    omnipaque    ondansetron    promethazine (PHENERGAN) IVPB    ramelteon    simethicone    sodium chloride 0.9%        Objective:     Vital Signs (Most Recent):  Temp: 96.6 °F (35.9 °C) (01/26/18 0734)  Pulse: 73 (01/26/18 0734)  Resp: 12 (01/26/18 0734)  BP: 114/70 (01/26/18 0734)  SpO2: 99 % (01/26/18 0734) Vital Signs (24h Range):  Temp:  [96.2 °F (35.7 °C)-98.3 °F (36.8 °C)] 96.6 °F (35.9 °C)  Pulse:  [56-73] 73  Resp:  [12-18] 12  SpO2:  [97 %-99 %] 99 %  BP: (114-122)/(64-71) 114/70     Intake/Output - Last 3 Shifts       01/24 0700 - 01/25 0659 01/25 0700 - 01/26 0659 01/26 0700 - 01/27 0659    P.O. 120 150     I.V. (mL/kg) 367 (6)      IV Piggyback 100 1100     TPN  600     Total Intake(mL/kg) 587 (9.6) 1850 (30.3)     Urine (mL/kg/hr) 750 (0.5) 1150 (0.8)     Emesis/NG output  0 (0)     Stool  0 (0)     Total Output 750 1150      Net -163 +700             Stool Occurrence  1 x     Emesis Occurrence  0 x           Physical Exam   Constitutional: He is oriented to person, place, and time. He appears  well-developed. No distress.   HENT:   Head: Normocephalic and atraumatic.   Eyes: EOM are normal.   Neck: Normal range of motion.   Cardiovascular: Normal rate and intact distal pulses.    Pulmonary/Chest: Effort normal. No respiratory distress.   Abdominal: Soft. He exhibits no distension. There is tenderness. There is no rebound and no guarding.   Midline incision c/d/i, penrose in place in RLQ tracking to R flank,    Musculoskeletal: Normal range of motion.   Neurological: He is alert and oriented to person, place, and time.   Skin: Skin is warm and dry.   Psychiatric: He has a normal mood and affect.     Significant Labs:  BMP (Last 3 Results):     Recent Labs  Lab 01/24/18  0433 01/25/18  0531 01/26/18  0555   GLU 84 96 95    135* 136   K 4.4 4.7 4.6    101 103   CO2 26 27 23   BUN 23* 25* 26*   CREATININE 0.8 0.9 0.8   CALCIUM 8.8 9.2 9.1   MG 1.9 2.1 2.1     CBC (Last 3 Results):     Recent Labs  Lab 01/24/18  0433 01/25/18  0531 01/26/18  0555   WBC 7.24 7.37 6.91   RBC 3.46* 3.63* 3.80*   HGB 8.6* 9.1* 9.4*   HCT 26.9* 28.5* 29.2*    315 308   MCV 78* 79* 77*   MCH 24.9* 25.1* 24.7*   MCHC 32.0 31.9* 32.2     Prealbumin (Last 3 Results):     Recent Labs  Lab 01/22/18  0531 01/26/18  0555   PREALBUMIN 11* 27

## 2018-01-26 NOTE — ASSESSMENT & PLAN NOTE
42yoM now 8 Days Post-Op from ex lap, JS, takedown ECF.    -Advance to LRD.   -Continue TPN at discharge, working with social work for approval.   -Pain/nausea meds as needed.  -Will start to back out Penrose drain.  -OOB/IS. Ambulate in hallway TID.  -DVT ppx

## 2018-01-26 NOTE — PROGRESS NOTES
Requested to see for wound care recommendations by Dr Rodriguez for RLQ tract to right flank.  Plans to continue advancing penrose drain and wants recommendations for discharge.   Left AMD gauze strip in the room and Aquacel Ribbon for packing . Used Mepilex border dressing as it is much kinder removal than silk tape and gauze .   Pt insistent he does not want anything assessed or done at this time to his right flank until the drain is actually removed.   Discussed wether he will be able to perform his own wound care to the site once he is home. He believes he could perform the abdominal wound care , but feels he will require assistance with the flank wound .   Has been going to a wound center in his area previously.  Kelin Cadena RN CWON  [id number]

## 2018-01-26 NOTE — PROGRESS NOTES
[facility]  Colorectal Surgery  Progress Note    Patient Name: Pedro Nelson  MRN: [id number]  Admission Date: 1/10/2018  Hospital Length of Stay: 16 days  Attending Physician: OKSANA Upton MD    Subjective:     Interval History:     NAEON. Pain stable. Denies nausea or vomiting. + BM    Post-Op Info:  Procedure(s) (LRB):  EXPLORATORY-LAPAROTOMY (N/A)  CATHETERIZATION-URETHRAL (Right)  LYSIS-ADHESION EXTENSIVE- LASTING MORE THAN 2 HRS  REPAIR-FISTULA-ENTEROCUTANEOUS WITH RESECTION  ENTERECTOMY  WRAP-OMENTAL   8 Days Post-Op      Medications:  Continuous Infusions:   TPN ADULT CENTRAL LINE CUSTOM 60 mL/hr at 01/25/18 2235     Scheduled Meds:   alteplase  2 mg Intra-Catheter Weekly    cyanocobalamin  1,000 mcg Intramuscular Q30 Days    enoxaparin  40 mg Subcutaneous Daily    gabapentin  100 mg Oral TID    ibuprofen  400 mg Intravenous Q8H    pantoprazole  40 mg Intravenous Daily    sodium chloride 0.9%  10 mL Intravenous Q6H     PRN Meds:   sodium chloride    alteplase    barium    barium    diphenhydrAMINE    hydrocodone-acetaminophen 7.5-325mg    meperidine    naloxone    omnipaque    ondansetron    promethazine (PHENERGAN) IVPB    ramelteon    simethicone    sodium chloride 0.9%        Objective:     Vital Signs (Most Recent):  Temp: 96.6 °F (35.9 °C) (01/26/18 0734)  Pulse: 73 (01/26/18 0734)  Resp: 12 (01/26/18 0734)  BP: 114/70 (01/26/18 0734)  SpO2: 99 % (01/26/18 0734) Vital Signs (24h Range):  Temp:  [96.2 °F (35.7 °C)-98.3 °F (36.8 °C)] 96.6 °F (35.9 °C)  Pulse:  [56-73] 73  Resp:  [12-18] 12  SpO2:  [97 %-99 %] 99 %  BP: (114-122)/(64-71) 114/70     Intake/Output - Last 3 Shifts       01/24 0700 - 01/25 0659 01/25 0700 - 01/26 0659 01/26 0700 - 01/27 0659    P.O. 120 150     I.V. (mL/kg) 367 (6)      IV Piggyback 100 1100     TPN  600     Total Intake(mL/kg) 587 (9.6) 1850 (30.3)     Urine (mL/kg/hr) 750 (0.5) 1150 (0.8)     Emesis/NG output  0 (0)     Stool  0  (0)     Total Output 750 1150      Net -163 +700             Stool Occurrence  1 x     Emesis Occurrence  0 x           Physical Exam   Constitutional: He is oriented to person, place, and time. He appears well-developed. No distress.   HENT:   Head: Normocephalic and atraumatic.   Eyes: EOM are normal.   Neck: Normal range of motion.   Cardiovascular: Normal rate and intact distal pulses.    Pulmonary/Chest: Effort normal. No respiratory distress.   Abdominal: Soft. He exhibits no distension. There is tenderness. There is no rebound and no guarding.   Midline incision c/d/i, penrose in place in RLQ tracking to R flank,    Musculoskeletal: Normal range of motion.   Neurological: He is alert and oriented to person, place, and time.   Skin: Skin is warm and dry.   Psychiatric: He has a normal mood and affect.     Significant Labs:  BMP (Last 3 Results):     Recent Labs  Lab 01/24/18  0433 01/25/18  0531 01/26/18  0555   GLU 84 96 95    135* 136   K 4.4 4.7 4.6    101 103   CO2 26 27 23   BUN 23* 25* 26*   CREATININE 0.8 0.9 0.8   CALCIUM 8.8 9.2 9.1   MG 1.9 2.1 2.1     CBC (Last 3 Results):     Recent Labs  Lab 01/24/18  0433 01/25/18  0531 01/26/18  0555   WBC 7.24 7.37 6.91   RBC 3.46* 3.63* 3.80*   HGB 8.6* 9.1* 9.4*   HCT 26.9* 28.5* 29.2*    315 308   MCV 78* 79* 77*   MCH 24.9* 25.1* 24.7*   MCHC 32.0 31.9* 32.2     Prealbumin (Last 3 Results):     Recent Labs  Lab 01/22/18  0531 01/26/18  0555   PREALBUMIN 11* 27     Assessment/Plan:     * Enterocutaneous fistula    42yoM now 8 Days Post-Op from ex lap, JS, takedown ECF.    -Advance to LRD.   -Continue TPN at discharge, working with social work for approval.   -Pain/nausea meds as needed.  -Continue to pull back penrose, will consult WOCN for recs on outpatient care for wound.  -OOB/IS. Ambulate in hallway TID.  -DVT ppx        Malnutrition    Cont TPN              Michelle Ng NP  Colorectal Surgery  [facility]  [place]

## 2018-01-27 LAB
ALBUMIN SERPL BCP-MCNC: 2.6 G/DL
ALP SERPL-CCNC: 151 U/L
ALT SERPL W/O P-5'-P-CCNC: 11 U/L
ANION GAP SERPL CALC-SCNC: 8 MMOL/L
AST SERPL-CCNC: 15 U/L
BASOPHILS # BLD AUTO: 0.03 K/UL
BASOPHILS NFR BLD: 0.5 %
BILIRUB SERPL-MCNC: 0.4 MG/DL
BUN SERPL-MCNC: 30 MG/DL
CALCIUM SERPL-MCNC: 9 MG/DL
CHLORIDE SERPL-SCNC: 103 MMOL/L
CO2 SERPL-SCNC: 26 MMOL/L
CREAT SERPL-MCNC: 0.8 MG/DL
CRP SERPL-MCNC: 10 MG/L
DIFFERENTIAL METHOD: ABNORMAL
EOSINOPHIL # BLD AUTO: 0.3 K/UL
EOSINOPHIL NFR BLD: 5.1 %
ERYTHROCYTE [DISTWIDTH] IN BLOOD BY AUTOMATED COUNT: 17.6 %
EST. GFR  (AFRICAN AMERICAN): >60 ML/MIN/1.73 M^2
EST. GFR  (NON AFRICAN AMERICAN): >60 ML/MIN/1.73 M^2
GLUCOSE SERPL-MCNC: 92 MG/DL
HCT VFR BLD AUTO: 28.5 %
HGB BLD-MCNC: 9 G/DL
IMM GRANULOCYTES # BLD AUTO: 0.02 K/UL
IMM GRANULOCYTES NFR BLD AUTO: 0.3 %
LYMPHOCYTES # BLD AUTO: 1.8 K/UL
LYMPHOCYTES NFR BLD: 29.8 %
MAGNESIUM SERPL-MCNC: 2 MG/DL
MCH RBC QN AUTO: 24.1 PG
MCHC RBC AUTO-ENTMCNC: 31.6 G/DL
MCV RBC AUTO: 76 FL
MONOCYTES # BLD AUTO: 0.8 K/UL
MONOCYTES NFR BLD: 12.5 %
NEUTROPHILS # BLD AUTO: 3.1 K/UL
NEUTROPHILS NFR BLD: 51.8 %
NRBC BLD-RTO: 0 /100 WBC
PHOSPHATE SERPL-MCNC: 4.1 MG/DL
PLATELET # BLD AUTO: 322 K/UL
PMV BLD AUTO: 9.6 FL
POTASSIUM SERPL-SCNC: 4.3 MMOL/L
PROT SERPL-MCNC: 7.7 G/DL
RBC # BLD AUTO: 3.74 M/UL
SODIUM SERPL-SCNC: 137 MMOL/L
WBC # BLD AUTO: 6.07 K/UL

## 2018-01-27 PROCEDURE — A4217 STERILE WATER/SALINE, 500 ML: HCPCS | Performed by: STUDENT IN AN ORGANIZED HEALTH CARE EDUCATION/TRAINING PROGRAM

## 2018-01-27 PROCEDURE — 80053 COMPREHEN METABOLIC PANEL: CPT

## 2018-01-27 PROCEDURE — 25000003 PHARM REV CODE 250: Performed by: SURGERY

## 2018-01-27 PROCEDURE — 63600175 PHARM REV CODE 636 W HCPCS: Performed by: STUDENT IN AN ORGANIZED HEALTH CARE EDUCATION/TRAINING PROGRAM

## 2018-01-27 PROCEDURE — 25000003 PHARM REV CODE 250: Performed by: COLON & RECTAL SURGERY

## 2018-01-27 PROCEDURE — 86140 C-REACTIVE PROTEIN: CPT

## 2018-01-27 PROCEDURE — 25000003 PHARM REV CODE 250: Performed by: STUDENT IN AN ORGANIZED HEALTH CARE EDUCATION/TRAINING PROGRAM

## 2018-01-27 PROCEDURE — 63600175 PHARM REV CODE 636 W HCPCS: Performed by: SURGERY

## 2018-01-27 PROCEDURE — 84100 ASSAY OF PHOSPHORUS: CPT

## 2018-01-27 PROCEDURE — A4216 STERILE WATER/SALINE, 10 ML: HCPCS | Performed by: COLON & RECTAL SURGERY

## 2018-01-27 PROCEDURE — B4185 PARENTERAL SOL 10 GM LIPIDS: HCPCS | Performed by: STUDENT IN AN ORGANIZED HEALTH CARE EDUCATION/TRAINING PROGRAM

## 2018-01-27 PROCEDURE — 11000001 HC ACUTE MED/SURG PRIVATE ROOM

## 2018-01-27 PROCEDURE — 85025 COMPLETE CBC W/AUTO DIFF WBC: CPT

## 2018-01-27 PROCEDURE — 83735 ASSAY OF MAGNESIUM: CPT

## 2018-01-27 PROCEDURE — C9113 INJ PANTOPRAZOLE SODIUM, VIA: HCPCS | Performed by: SURGERY

## 2018-01-27 RX ORDER — HYDROCODONE BITARTRATE AND ACETAMINOPHEN 7.5; 325 MG/1; MG/1
1 TABLET ORAL EVERY 4 HOURS PRN
Qty: 40 TABLET | Refills: 0 | Status: SHIPPED | OUTPATIENT
Start: 2018-01-27

## 2018-01-27 RX ADMIN — Medication 10 ML: at 12:01

## 2018-01-27 RX ADMIN — GABAPENTIN 100 MG: 100 CAPSULE ORAL at 09:01

## 2018-01-27 RX ADMIN — ALTEPLASE 2 MG: 2.2 INJECTION, POWDER, LYOPHILIZED, FOR SOLUTION INTRAVENOUS at 08:01

## 2018-01-27 RX ADMIN — IBUPROFEN 400 MG: 800 INJECTION INTRAVENOUS at 06:01

## 2018-01-27 RX ADMIN — GABAPENTIN 100 MG: 100 CAPSULE ORAL at 03:01

## 2018-01-27 RX ADMIN — GABAPENTIN 100 MG: 100 CAPSULE ORAL at 06:01

## 2018-01-27 RX ADMIN — IBUPROFEN 400 MG: 800 INJECTION INTRAVENOUS at 09:01

## 2018-01-27 RX ADMIN — PANTOPRAZOLE SODIUM 40 MG: 40 INJECTION, POWDER, FOR SOLUTION INTRAVENOUS at 08:01

## 2018-01-27 RX ADMIN — HYDROCODONE BITARTRATE AND ACETAMINOPHEN 1 TABLET: 7.5; 325 TABLET ORAL at 03:01

## 2018-01-27 RX ADMIN — HYDROCODONE BITARTRATE AND ACETAMINOPHEN 1 TABLET: 7.5; 325 TABLET ORAL at 04:01

## 2018-01-27 RX ADMIN — SOYBEAN OIL 250 ML: 20 INJECTION, SOLUTION INTRAVENOUS at 09:01

## 2018-01-27 RX ADMIN — ENOXAPARIN SODIUM 40 MG: 100 INJECTION SUBCUTANEOUS at 04:01

## 2018-01-27 RX ADMIN — HYDROCODONE BITARTRATE AND ACETAMINOPHEN 1 TABLET: 7.5; 325 TABLET ORAL at 12:01

## 2018-01-27 RX ADMIN — HYDROCODONE BITARTRATE AND ACETAMINOPHEN 1 TABLET: 7.5; 325 TABLET ORAL at 09:01

## 2018-01-27 RX ADMIN — CALCIUM GLUCONATE: 94 INJECTION, SOLUTION INTRAVENOUS at 09:01

## 2018-01-27 RX ADMIN — HYDROCODONE BITARTRATE AND ACETAMINOPHEN 1 TABLET: 7.5; 325 TABLET ORAL at 08:01

## 2018-01-27 RX ADMIN — IBUPROFEN 400 MG: 800 INJECTION INTRAVENOUS at 02:01

## 2018-01-27 NOTE — PROGRESS NOTES
[facility]  Colorectal Surgery  Progress Note    Patient Name: Pedro Nelson  MRN: [id number]  Admission Date: 1/10/2018  Hospital Length of Stay: 17 days  Attending Physician: OKSANA Upton MD    Subjective:     Interval History: no acute events. Pain controlled. Tolerating PO but low intake.    Post-Op Info:  Procedure(s) (LRB):  EXPLORATORY-LAPAROTOMY (N/A)  CATHETERIZATION-URETHRAL (Right)  LYSIS-ADHESION EXTENSIVE- LASTING MORE THAN 2 HRS  REPAIR-FISTULA-ENTEROCUTANEOUS WITH RESECTION  ENTERECTOMY  WRAP-OMENTAL   9 Days Post-Op      Medications:  Continuous Infusions:   TPN ADULT CENTRAL LINE CUSTOM 60 mL/hr at 01/26/18 2300     Scheduled Meds:   alteplase  2 mg Intra-Catheter Weekly    cyanocobalamin  1,000 mcg Intramuscular Q30 Days    enoxaparin  40 mg Subcutaneous Daily    fat emulsion 20%  250 mL Intravenous Daily    gabapentin  100 mg Oral TID    ibuprofen  400 mg Intravenous Q8H    pantoprazole  40 mg Intravenous Daily    sodium chloride 0.9%  10 mL Intravenous Q6H     PRN Meds:   sodium chloride    alteplase    barium    barium    diphenhydrAMINE    hydrocodone-acetaminophen 7.5-325mg    naloxone    omnipaque    ondansetron    promethazine (PHENERGAN) IVPB    ramelteon    simethicone    sodium chloride 0.9%        Objective:     Vital Signs (Most Recent):  Temp: 97 °F (36.1 °C) (01/27/18 0729)  Pulse: 71 (01/27/18 0729)  Resp: 14 (01/27/18 0729)  BP: 112/68 (01/27/18 0729)  SpO2: 95 % (01/27/18 0729) Vital Signs (24h Range):  Temp:  [97 °F (36.1 °C)-97.9 °F (36.6 °C)] 97 °F (36.1 °C)  Pulse:  [66-78] 71  Resp:  [12-18] 14  SpO2:  [95 %-100 %] 95 %  BP: (109-118)/(56-68) 112/68     Intake/Output - Last 3 Shifts       01/25 0700 - 01/26 0659 01/26 0700 - 01/27 0659 01/27 0700 - 01/28 0659    P.O. 150 460     I.V. (mL/kg)       IV Piggyback 1100       580     Total Intake(mL/kg) 1850 (30.3) 1040 (17)     Urine (mL/kg/hr) 1150 (0.8) 1300 (0.9)     Emesis/NG  output 0 (0)      Stool 0 (0)      Total Output 1150 1300      Net +700 -260             Stool Occurrence 1 x      Emesis Occurrence 0 x            Physical Exam   Constitutional: He is oriented to person, place, and time. He appears well-developed. No distress.   HENT:   Head: Normocephalic and atraumatic.   Eyes: EOM are normal.   Neck: Normal range of motion.   Cardiovascular: Normal rate and intact distal pulses.    Pulmonary/Chest: Effort normal. No respiratory distress.   Abdominal: Soft. He exhibits no distension. There is tenderness. There is no rebound and no guarding.   Midline incision c/d/i, penrose in place in RLQ tracking to R flank,    Musculoskeletal: Normal range of motion.   Neurological: He is alert and oriented to person, place, and time.   Skin: Skin is warm and dry.   Psychiatric: He has a normal mood and affect.         Significant Labs:  BMP (Last 3 Results):   Recent Labs  Lab 01/25/18  0531 01/26/18  0555 01/27/18  0420   GLU 96 95 92   * 136 137   K 4.7 4.6 4.3    103 103   CO2 27 23 26   BUN 25* 26* 30*   CREATININE 0.9 0.8 0.8   CALCIUM 9.2 9.1 9.0   MG 2.1 2.1 2.0     CBC (Last 3 Results):   Recent Labs  Lab 01/25/18  0531 01/26/18  0555 01/27/18  0420   WBC 7.37 6.91 6.07   RBC 3.63* 3.80* 3.74*   HGB 9.1* 9.4* 9.0*   HCT 28.5* 29.2* 28.5*    308 322   MCV 79* 77* 76*   MCH 25.1* 24.7* 24.1*   MCHC 31.9* 32.2 31.6*       Significant Diagnostics:  None    Assessment/Plan:     * Enterocutaneous fistula    42yoM now 9 Days Post-Op from ex lap, JS, takedown ECF.    -Continue diet as tolerated   -Continue TPN at discharge, working with social work for approval.   -Pain/nausea meds as needed.  -Will start to back out Penrose drain.  -OOB/IS. Ambulate in hallway TID.  -DVT ppx        Malnutrition    Cont TPN              Hung Iyer MD  Colorectal Surgery  [facility]

## 2018-01-27 NOTE — PLAN OF CARE
Problem: Pain, Acute (Adult)  Intervention: Monitor/Manage Analgesia  Pt  rec pain med q 4hrs for pain

## 2018-01-27 NOTE — ASSESSMENT & PLAN NOTE
42yoM now 9 Days Post-Op from ex evelyn, JS, takedown ECF.    -Continue diet as tolerated   -Continue TPN at discharge, working with social work for approval.   -Pain/nausea meds as needed.  -Will start to back out Penrose drain.  -OOB/IS. Ambulate in hallway TID.  -DVT ppx

## 2018-01-27 NOTE — SUBJECTIVE & OBJECTIVE
Subjective:     Interval History: no acute events. Pain controlled. Tolerating PO but low intake.    Post-Op Info:  Procedure(s) (LRB):  EXPLORATORY-LAPAROTOMY (N/A)  CATHETERIZATION-URETHRAL (Right)  LYSIS-ADHESION EXTENSIVE- LASTING MORE THAN 2 HRS  REPAIR-FISTULA-ENTEROCUTANEOUS WITH RESECTION  ENTERECTOMY  WRAP-OMENTAL   9 Days Post-Op      Medications:  Continuous Infusions:   TPN ADULT CENTRAL LINE CUSTOM 60 mL/hr at 01/26/18 2300     Scheduled Meds:   alteplase  2 mg Intra-Catheter Weekly    cyanocobalamin  1,000 mcg Intramuscular Q30 Days    enoxaparin  40 mg Subcutaneous Daily    fat emulsion 20%  250 mL Intravenous Daily    gabapentin  100 mg Oral TID    ibuprofen  400 mg Intravenous Q8H    pantoprazole  40 mg Intravenous Daily    sodium chloride 0.9%  10 mL Intravenous Q6H     PRN Meds:   sodium chloride    alteplase    barium    barium    diphenhydrAMINE    hydrocodone-acetaminophen 7.5-325mg    naloxone    omnipaque    ondansetron    promethazine (PHENERGAN) IVPB    ramelteon    simethicone    sodium chloride 0.9%        Objective:     Vital Signs (Most Recent):  Temp: 97 °F (36.1 °C) (01/27/18 0729)  Pulse: 71 (01/27/18 0729)  Resp: 14 (01/27/18 0729)  BP: 112/68 (01/27/18 0729)  SpO2: 95 % (01/27/18 0729) Vital Signs (24h Range):  Temp:  [97 °F (36.1 °C)-97.9 °F (36.6 °C)] 97 °F (36.1 °C)  Pulse:  [66-78] 71  Resp:  [12-18] 14  SpO2:  [95 %-100 %] 95 %  BP: (109-118)/(56-68) 112/68     Intake/Output - Last 3 Shifts       01/25 0700 - 01/26 0659 01/26 0700 - 01/27 0659 01/27 0700 - 01/28 0659    P.O. 150 460     I.V. (mL/kg)       IV Piggyback 1100       580     Total Intake(mL/kg) 1850 (30.3) 1040 (17)     Urine (mL/kg/hr) 1150 (0.8) 1300 (0.9)     Emesis/NG output 0 (0)      Stool 0 (0)      Total Output 1150 1300      Net +700 -260             Stool Occurrence 1 x      Emesis Occurrence 0 x            Physical Exam   Constitutional: He is oriented to person, place, and  time. He appears well-developed. No distress.   HENT:   Head: Normocephalic and atraumatic.   Eyes: EOM are normal.   Neck: Normal range of motion.   Cardiovascular: Normal rate and intact distal pulses.    Pulmonary/Chest: Effort normal. No respiratory distress.   Abdominal: Soft. He exhibits no distension. There is tenderness. There is no rebound and no guarding.   Midline incision c/d/i, penrose in place in RLQ tracking to R flank,    Musculoskeletal: Normal range of motion.   Neurological: He is alert and oriented to person, place, and time.   Skin: Skin is warm and dry.   Psychiatric: He has a normal mood and affect.         Significant Labs:  BMP (Last 3 Results):   Recent Labs  Lab 01/25/18  0531 01/26/18  0555 01/27/18  0420   GLU 96 95 92   * 136 137   K 4.7 4.6 4.3    103 103   CO2 27 23 26   BUN 25* 26* 30*   CREATININE 0.9 0.8 0.8   CALCIUM 9.2 9.1 9.0   MG 2.1 2.1 2.0     CBC (Last 3 Results):   Recent Labs  Lab 01/25/18  0531 01/26/18  0555 01/27/18  0420   WBC 7.37 6.91 6.07   RBC 3.63* 3.80* 3.74*   HGB 9.1* 9.4* 9.0*   HCT 28.5* 29.2* 28.5*    308 322   MCV 79* 77* 76*   MCH 25.1* 24.7* 24.1*   MCHC 31.9* 32.2 31.6*       Significant Diagnostics:  None

## 2018-01-28 VITALS
DIASTOLIC BLOOD PRESSURE: 68 MMHG | OXYGEN SATURATION: 100 % | HEIGHT: 75 IN | RESPIRATION RATE: 18 BRPM | BODY MASS INDEX: 16.75 KG/M2 | TEMPERATURE: 97 F | HEART RATE: 60 BPM | SYSTOLIC BLOOD PRESSURE: 107 MMHG | WEIGHT: 134.69 LBS

## 2018-01-28 LAB
ALBUMIN SERPL BCP-MCNC: 2.7 G/DL
ALP SERPL-CCNC: 156 U/L
ALT SERPL W/O P-5'-P-CCNC: 13 U/L
ANION GAP SERPL CALC-SCNC: 8 MMOL/L
AST SERPL-CCNC: 18 U/L
BASOPHILS # BLD AUTO: 0.04 K/UL
BASOPHILS NFR BLD: 0.6 %
BILIRUB SERPL-MCNC: 0.3 MG/DL
BUN SERPL-MCNC: 29 MG/DL
CALCIUM SERPL-MCNC: 8.6 MG/DL
CHLORIDE SERPL-SCNC: 105 MMOL/L
CO2 SERPL-SCNC: 24 MMOL/L
CREAT SERPL-MCNC: 0.8 MG/DL
CRP SERPL-MCNC: 6.6 MG/L
DIFFERENTIAL METHOD: ABNORMAL
EOSINOPHIL # BLD AUTO: 0.3 K/UL
EOSINOPHIL NFR BLD: 4 %
ERYTHROCYTE [DISTWIDTH] IN BLOOD BY AUTOMATED COUNT: 17.7 %
EST. GFR  (AFRICAN AMERICAN): >60 ML/MIN/1.73 M^2
EST. GFR  (NON AFRICAN AMERICAN): >60 ML/MIN/1.73 M^2
GLUCOSE SERPL-MCNC: 86 MG/DL
HCT VFR BLD AUTO: 27.7 %
HGB BLD-MCNC: 8.8 G/DL
IMM GRANULOCYTES # BLD AUTO: 0.02 K/UL
IMM GRANULOCYTES NFR BLD AUTO: 0.3 %
LYMPHOCYTES # BLD AUTO: 1.9 K/UL
LYMPHOCYTES NFR BLD: 28.9 %
MAGNESIUM SERPL-MCNC: 2 MG/DL
MCH RBC QN AUTO: 24.2 PG
MCHC RBC AUTO-ENTMCNC: 31.8 G/DL
MCV RBC AUTO: 76 FL
MONOCYTES # BLD AUTO: 0.8 K/UL
MONOCYTES NFR BLD: 11.7 %
NEUTROPHILS # BLD AUTO: 3.6 K/UL
NEUTROPHILS NFR BLD: 54.5 %
NRBC BLD-RTO: 0 /100 WBC
PHOSPHATE SERPL-MCNC: 3.5 MG/DL
PLATELET # BLD AUTO: 299 K/UL
PMV BLD AUTO: 9.7 FL
POTASSIUM SERPL-SCNC: 4.1 MMOL/L
PROT SERPL-MCNC: 7.5 G/DL
RBC # BLD AUTO: 3.64 M/UL
SODIUM SERPL-SCNC: 137 MMOL/L
WBC # BLD AUTO: 6.57 K/UL

## 2018-01-28 PROCEDURE — 85025 COMPLETE CBC W/AUTO DIFF WBC: CPT

## 2018-01-28 PROCEDURE — 25000003 PHARM REV CODE 250: Performed by: COLON & RECTAL SURGERY

## 2018-01-28 PROCEDURE — 25000003 PHARM REV CODE 250: Performed by: SURGERY

## 2018-01-28 PROCEDURE — 86140 C-REACTIVE PROTEIN: CPT

## 2018-01-28 PROCEDURE — 84100 ASSAY OF PHOSPHORUS: CPT

## 2018-01-28 PROCEDURE — 83735 ASSAY OF MAGNESIUM: CPT

## 2018-01-28 PROCEDURE — A4216 STERILE WATER/SALINE, 10 ML: HCPCS | Performed by: COLON & RECTAL SURGERY

## 2018-01-28 PROCEDURE — 25000003 PHARM REV CODE 250: Performed by: STUDENT IN AN ORGANIZED HEALTH CARE EDUCATION/TRAINING PROGRAM

## 2018-01-28 PROCEDURE — 63600175 PHARM REV CODE 636 W HCPCS: Performed by: SURGERY

## 2018-01-28 PROCEDURE — C9113 INJ PANTOPRAZOLE SODIUM, VIA: HCPCS | Performed by: SURGERY

## 2018-01-28 PROCEDURE — 80053 COMPREHEN METABOLIC PANEL: CPT

## 2018-01-28 RX ORDER — ONDANSETRON 4 MG/1
4 TABLET, FILM COATED ORAL EVERY 6 HOURS PRN
Qty: 40 TABLET | Refills: 3 | Status: SHIPPED | OUTPATIENT
Start: 2018-01-28

## 2018-01-28 RX ADMIN — HYDROCODONE BITARTRATE AND ACETAMINOPHEN 1 TABLET: 7.5; 325 TABLET ORAL at 09:01

## 2018-01-28 RX ADMIN — HYDROCODONE BITARTRATE AND ACETAMINOPHEN 1 TABLET: 7.5; 325 TABLET ORAL at 06:01

## 2018-01-28 RX ADMIN — IBUPROFEN 400 MG: 800 INJECTION INTRAVENOUS at 06:01

## 2018-01-28 RX ADMIN — GABAPENTIN 100 MG: 100 CAPSULE ORAL at 06:01

## 2018-01-28 RX ADMIN — HYDROCODONE BITARTRATE AND ACETAMINOPHEN 1 TABLET: 7.5; 325 TABLET ORAL at 01:01

## 2018-01-28 RX ADMIN — PANTOPRAZOLE SODIUM 40 MG: 40 INJECTION, POWDER, FOR SOLUTION INTRAVENOUS at 09:01

## 2018-01-28 RX ADMIN — Medication 10 ML: at 12:01

## 2018-01-28 NOTE — ASSESSMENT & PLAN NOTE
42yoM now 10 Days Post-Op from ex lap, JS, takedown ECF.    -Continue diet as tolerated   -TPN stopped. Patient going back to [place] and will have TPN set up by surgeon there.   -Pain/nausea meds as needed.  -D/C penrose drain today  -OOB/IS. Ambulate in hallway TID.  -DVT ppx  -Dispo: D/C home today

## 2018-01-28 NOTE — SUBJECTIVE & OBJECTIVE
Subjective:     Interval History: No issues overnight. Pain controlled. Tolerating diet    Post-Op Info:  Procedure(s) (LRB):  EXPLORATORY-LAPAROTOMY (N/A)  CATHETERIZATION-URETHRAL (Right)  LYSIS-ADHESION EXTENSIVE- LASTING MORE THAN 2 HRS  REPAIR-FISTULA-ENTEROCUTANEOUS WITH RESECTION  ENTERECTOMY  WRAP-OMENTAL   10 Days Post-Op      Medications:  Continuous Infusions:   TPN ADULT CENTRAL LINE CUSTOM 60 mL/hr at 01/27/18 2120     Scheduled Meds:   alteplase  2 mg Intra-Catheter Weekly    cyanocobalamin  1,000 mcg Intramuscular Q30 Days    enoxaparin  40 mg Subcutaneous Daily    fat emulsion 20%  250 mL Intravenous Daily    gabapentin  100 mg Oral TID    ibuprofen  400 mg Intravenous Q8H    pantoprazole  40 mg Intravenous Daily    sodium chloride 0.9%  10 mL Intravenous Q6H     PRN Meds:   sodium chloride    alteplase    barium    barium    diphenhydrAMINE    hydrocodone-acetaminophen 7.5-325mg    naloxone    omnipaque    ondansetron    promethazine (PHENERGAN) IVPB    ramelteon    simethicone    sodium chloride 0.9%        Objective:     Vital Signs (Most Recent):  Temp: 97 °F (36.1 °C) (01/28/18 0710)  Pulse: 60 (01/28/18 0710)  Resp: 18 (01/28/18 0710)  BP: 107/68 (01/28/18 0710)  SpO2: 100 % (01/28/18 0710) Vital Signs (24h Range):  Temp:  [97 °F (36.1 °C)-98.4 °F (36.9 °C)] 97 °F (36.1 °C)  Pulse:  [59-71] 60  Resp:  [15-18] 18  SpO2:  [99 %-100 %] 100 %  BP: (105-119)/(58-70) 107/68     Intake/Output - Last 3 Shifts       01/26 0700 - 01/27 0659 01/27 0700 - 01/28 0659 01/28 0700 - 01/29 0659    P.O. 460 908     IV Piggyback        1140     Total Intake(mL/kg) 1040 (17) 2048 (33.5)     Urine (mL/kg/hr) 1300 (0.9) 1150 (0.8)     Emesis/NG output  0 (0)     Stool  0 (0)     Blood  0 (0)     Total Output 1300 1150      Net -260 +898             Stool Occurrence  0 x           Physical Exam   Constitutional: He is oriented to person, place, and time. He appears well-developed. No  distress.   HENT:   Head: Normocephalic and atraumatic.   Eyes: EOM are normal.   Neck: Normal range of motion.   Cardiovascular: Normal rate and intact distal pulses.    Pulmonary/Chest: Effort normal. No respiratory distress.   Abdominal: Soft. He exhibits no distension. There is tenderness. There is no rebound and no guarding.   Midline incision c/d/i, penrose in place in RLQ tracking to R flank,    Musculoskeletal: Normal range of motion.   Neurological: He is alert and oriented to person, place, and time.   Skin: Skin is warm and dry.   Psychiatric: He has a normal mood and affect.     Significant Labs:  BMP (Last 3 Results):   Recent Labs  Lab 01/26/18  0555 01/27/18  0420 01/28/18  0615   GLU 95 92 86    137 137   K 4.6 4.3 4.1    103 105   CO2 23 26 24   BUN 26* 30* 29*   CREATININE 0.8 0.8 0.8   CALCIUM 9.1 9.0 8.6*   MG 2.1 2.0 2.0     CBC (Last 3 Results):   Recent Labs  Lab 01/26/18  0555 01/27/18  0420 01/28/18  0615   WBC 6.91 6.07 6.57   RBC 3.80* 3.74* 3.64*   HGB 9.4* 9.0* 8.8*   HCT 29.2* 28.5* 27.7*    322 299   MCV 77* 76* 76*   MCH 24.7* 24.1* 24.2*   MCHC 32.2 31.6* 31.8*     CRP (Last 3 Results):   Recent Labs  Lab 01/26/18  0555 01/27/18  0420 01/28/18  0615   CRP 14.2* 10.0* 6.6     LFTs:   Recent Labs  Lab 01/28/18  0615   ALT 13   AST 18   ALKPHOS 156*   BILITOT 0.3   PROT 7.5   ALBUMIN 2.7*       Significant Diagnostics:  I have reviewed all pertinent imaging results/findings within the past 24 hours.

## 2018-01-28 NOTE — NURSING
Pt d/c per MD orders. Pt verbalized understanding d/c instructions. Written prescriptions given to patient. VSS. Pt left via wheelchair escorted by staff.

## 2018-01-28 NOTE — SUBJECTIVE & OBJECTIVE
Interval History: No issues overnight. Pain controlled. Tolerating diet. Mild drainage from wound.    Medications:  Continuous Infusions:   TPN ADULT CENTRAL LINE CUSTOM 60 mL/hr at 01/27/18 2120     Scheduled Meds:   alteplase  2 mg Intra-Catheter Weekly    cyanocobalamin  1,000 mcg Intramuscular Q30 Days    enoxaparin  40 mg Subcutaneous Daily    fat emulsion 20%  250 mL Intravenous Daily    gabapentin  100 mg Oral TID    ibuprofen  400 mg Intravenous Q8H    pantoprazole  40 mg Intravenous Daily    sodium chloride 0.9%  10 mL Intravenous Q6H     PRN Meds:sodium chloride, alteplase, barium, barium, diphenhydrAMINE, hydrocodone-acetaminophen 7.5-325mg, naloxone, omnipaque, ondansetron, promethazine (PHENERGAN) IVPB, ramelteon, simethicone, Flushing PICC Protocol **AND** sodium chloride 0.9% **AND** sodium chloride 0.9%     Review of patient's allergies indicates:  No Known Allergies  Objective:     Vital Signs (Most Recent):  Temp: 97 °F (36.1 °C) (01/28/18 0710)  Pulse: 60 (01/28/18 0710)  Resp: 18 (01/28/18 0710)  BP: 107/68 (01/28/18 0710)  SpO2: 100 % (01/28/18 0710) Vital Signs (24h Range):  Temp:  [97 °F (36.1 °C)-98.4 °F (36.9 °C)] 97 °F (36.1 °C)  Pulse:  [59-71] 60  Resp:  [15-18] 18  SpO2:  [99 %-100 %] 100 %  BP: (105-119)/(58-70) 107/68     Weight: 61.1 kg (134 lb 11.2 oz)  Body mass index is 16.84 kg/m².    Intake/Output - Last 3 Shifts       01/26 0700 - 01/27 0659 01/27 0700 - 01/28 0659 01/28 0700 - 01/29 0659    P.O. 460 908     IV Piggyback        1140     Total Intake(mL/kg) 1040 (17) 2048 (33.5)     Urine (mL/kg/hr) 1300 (0.9) 1150 (0.8)     Emesis/NG output  0 (0)     Stool  0 (0)     Blood  0 (0)     Total Output 1300 1150      Net -260 +898             Stool Occurrence  0 x           Physical Exam   Constitutional: He is oriented to person, place, and time. He appears well-developed. No distress.   HENT:   Head: Normocephalic and atraumatic.   Eyes: EOM are normal.   Neck:  Normal range of motion.   Cardiovascular: Normal rate and intact distal pulses.    Pulmonary/Chest: Effort normal. No respiratory distress.   Abdominal: Soft. He exhibits no distension. There is tenderness. There is no rebound and no guarding.   Midline incision c/d/i, penrose in place in RLQ tracking to R flank,    Musculoskeletal: Normal range of motion.   Neurological: He is alert and oriented to person, place, and time.   Skin: Skin is warm and dry.   Psychiatric: He has a normal mood and affect.     Significant Labs:  CBC:   Recent Labs  Lab 01/28/18  0615   WBC 6.57   RBC 3.64*   HGB 8.8*   HCT 27.7*      MCV 76*   MCH 24.2*   MCHC 31.8*     BMP:   Recent Labs  Lab 01/28/18  0615   GLU 86      K 4.1      CO2 24   BUN 29*   CREATININE 0.8   CALCIUM 8.6*   MG 2.0     LFTs:   Recent Labs  Lab 01/28/18  0615   ALT 13   AST 18   ALKPHOS 156*   BILITOT 0.3   PROT 7.5   ALBUMIN 2.7*     Microbiology Results (last 7 days)     ** No results found for the last 168 hours. **          Significant Diagnostics:  I have reviewed all pertinent imaging results/findings within the past 24 hours.

## 2018-01-28 NOTE — PROGRESS NOTES
[facility]  Colorectal Surgery  Progress Note    Patient Name: Pedro Nelson  MRN: [id number]  Admission Date: 1/10/2018  Hospital Length of Stay: 18 days  Attending Physician: OKSANA Upton MD    Subjective:     Interval History: No issues overnight. Pain controlled. Tolerating diet    Post-Op Info:  Procedure(s) (LRB):  EXPLORATORY-LAPAROTOMY (N/A)  CATHETERIZATION-URETHRAL (Right)  LYSIS-ADHESION EXTENSIVE- LASTING MORE THAN 2 HRS  REPAIR-FISTULA-ENTEROCUTANEOUS WITH RESECTION  ENTERECTOMY  WRAP-OMENTAL   10 Days Post-Op      Medications:  Continuous Infusions:   TPN ADULT CENTRAL LINE CUSTOM 60 mL/hr at 01/27/18 2120     Scheduled Meds:   alteplase  2 mg Intra-Catheter Weekly    cyanocobalamin  1,000 mcg Intramuscular Q30 Days    enoxaparin  40 mg Subcutaneous Daily    fat emulsion 20%  250 mL Intravenous Daily    gabapentin  100 mg Oral TID    ibuprofen  400 mg Intravenous Q8H    pantoprazole  40 mg Intravenous Daily    sodium chloride 0.9%  10 mL Intravenous Q6H     PRN Meds:   sodium chloride    alteplase    barium    barium    diphenhydrAMINE    hydrocodone-acetaminophen 7.5-325mg    naloxone    omnipaque    ondansetron    promethazine (PHENERGAN) IVPB    ramelteon    simethicone    sodium chloride 0.9%        Objective:     Vital Signs (Most Recent):  Temp: 97 °F (36.1 °C) (01/28/18 0710)  Pulse: 60 (01/28/18 0710)  Resp: 18 (01/28/18 0710)  BP: 107/68 (01/28/18 0710)  SpO2: 100 % (01/28/18 0710) Vital Signs (24h Range):  Temp:  [97 °F (36.1 °C)-98.4 °F (36.9 °C)] 97 °F (36.1 °C)  Pulse:  [59-71] 60  Resp:  [15-18] 18  SpO2:  [99 %-100 %] 100 %  BP: (105-119)/(58-70) 107/68     Intake/Output - Last 3 Shifts       01/26 0700 - 01/27 0659 01/27 0700 - 01/28 0659 01/28 0700 - 01/29 0659    P.O. 460 908     IV Piggyback        1140     Total Intake(mL/kg) 1040 (17) 2048 (33.5)     Urine (mL/kg/hr) 1300 (0.9) 1150 (0.8)     Emesis/NG output  0 (0)     Stool  0  (0)     Blood  0 (0)     Total Output 1300 1150      Net -260 +898             Stool Occurrence  0 x           Physical Exam   Constitutional: He is oriented to person, place, and time. He appears well-developed. No distress.   HENT:   Head: Normocephalic and atraumatic.   Eyes: EOM are normal.   Neck: Normal range of motion.   Cardiovascular: Normal rate and intact distal pulses.    Pulmonary/Chest: Effort normal. No respiratory distress.   Abdominal: Soft. He exhibits no distension. There is tenderness. There is no rebound and no guarding.   Midline incision c/d/i, penrose in place in RLQ tracking to R flank,    Musculoskeletal: Normal range of motion.   Neurological: He is alert and oriented to person, place, and time.   Skin: Skin is warm and dry.   Psychiatric: He has a normal mood and affect.     Significant Labs:  BMP (Last 3 Results):   Recent Labs  Lab 01/26/18  0555 01/27/18  0420 01/28/18  0615   GLU 95 92 86    137 137   K 4.6 4.3 4.1    103 105   CO2 23 26 24   BUN 26* 30* 29*   CREATININE 0.8 0.8 0.8   CALCIUM 9.1 9.0 8.6*   MG 2.1 2.0 2.0     CBC (Last 3 Results):   Recent Labs  Lab 01/26/18  0555 01/27/18  0420 01/28/18  0615   WBC 6.91 6.07 6.57   RBC 3.80* 3.74* 3.64*   HGB 9.4* 9.0* 8.8*   HCT 29.2* 28.5* 27.7*    322 299   MCV 77* 76* 76*   MCH 24.7* 24.1* 24.2*   MCHC 32.2 31.6* 31.8*     CRP (Last 3 Results):   Recent Labs  Lab 01/26/18  0555 01/27/18  0420 01/28/18  0615   CRP 14.2* 10.0* 6.6     LFTs:   Recent Labs  Lab 01/28/18  0615   ALT 13   AST 18   ALKPHOS 156*   BILITOT 0.3   PROT 7.5   ALBUMIN 2.7*       Significant Diagnostics:  I have reviewed all pertinent imaging results/findings within the past 24 hours.    Assessment/Plan:     * Enterocutaneous fistula    42yoM now 10 Days Post-Op from ex lap, JS, takedown ECF.    -Continue diet as tolerated   -TPN stopped. Patient going back to [place] and will have TPN set up by surgeon there.   -Pain/nausea meds as  needed.  -D/C penrose drain today  -OOB/IS. Ambulate in hallway TID.  -DVT ppx  -Dispo: D/C home today        Malnutrition    Plan for TPN to restart when he gets back to Geoff Foster MD  Colorectal Surgery  [facility]

## 2018-01-29 NOTE — DISCHARGE SUMMARY
[facility]  General Surgery  Discharge Summary      Patient Name: Pedro Nelson  MRN: [id number]  Admission Date: 1/10/2018  Hospital Length of Stay: 18 days  Discharge Date and Time: 1/28/2018  9:21 AM  Attending Physician: Lucia att. providers found   Discharging Provider: Reed Foster MD  Primary Care Provider: Primary Doctor No     HPI: HPI  43 yo male with Crohn's ileitis, fistulizing.  Underwent resection 5/ 2017. Did well initially after surgery.  D/c from hospital tolerating diet, moving bowels.  Returned to [place].      Two weeks after d/c pt began leaking from midline wound.  Managed at local hospital.    TPN antibiotics.   Never operated on there.    Now leaking from all prior EC fistula sites - midline wound, right lower quadrant, and right flank.    Currently no nausea or vomiting.  Tolerating some PO.  BMs qod.  No blood.  TPN last given two days ago.  No fever.    Last biologic Humira - last dose - 40 u qow.  Last dose last week.    Procedure(s) (LRB):  EXPLORATORY-LAPAROTOMY (N/A)  CATHETERIZATION-URETHRAL (Right)  LYSIS-ADHESION EXTENSIVE- LASTING MORE THAN 2 HRS  REPAIR-FISTULA-ENTEROCUTANEOUS WITH RESECTION  ENTERECTOMY  WRAP-OMENTAL     Hospital Course: The patient was admitted and started on IV antibiotics and TPN. On 1/18/18 he underwent the above listed procedures. Please see operative note for more details. Post-operatively he did well. Initially tolerated diet but developed an ileus that was treated conservatively. He did not require NGT and his bowel function slowly returned. His diet was advanced slowly and he has been tolerating a low residue diet without issue. Required transfusion of 1 unit of PRBC post-op but H/H has been stable since. Pain controlled with oral medications. His TPN was weaned off and he has plans to restart TPN when he gets back to [place].    Consults:   Consults         Status Ordering Provider     Inpatient consult to PICC team ([facility])  Once     Provider:   (Not yet assigned)    Completed NIKKO COREY     Inpatient consult to PICC team ([facility])  Once     Provider:  (Not yet assigned)    Completed NIKKO COREY          Significant Diagnostic Studies: Labs: All labs within the past 24 hours have been reviewed    Pending Diagnostic Studies:     None        Final Active Diagnoses:    Diagnosis Date Noted POA    PRINCIPAL PROBLEM:  Enterocutaneous fistula [K63.2] 04/10/2017 Yes    Malnutrition [E46] 01/15/2018 Yes    Crohn's disease of ileum with fistula [K50.013] 05/26/2017 Unknown      Problems Resolved During this Admission:    Diagnosis Date Noted Date Resolved POA      Discharged Condition: fair    Disposition: Home or Self Care    Follow Up:  Follow-up Information     H Benjamin Upton MD.    Specialty:  Colon and Rectal Surgery  Why:  As needed  Contact information:  [address]  [facility] [address] [phone]                 Patient Instructions:     Diet Adult Regular     Activity as tolerated     Lifting restrictions   Order Comments: No lifting greater than 10 lbs for 6 weeks.     Notify your health care provider if you experience any of the following:  redness, tenderness, or signs of infection (pain, swelling, redness, odor or green/yellow discharge around incision site)     Notify your health care provider if you experience any of the following:  persistent nausea and vomiting or diarrhea     Notify your health care provider if you experience any of the following:  temperature >100.4     Notify your health care provider if you experience any of the following:  severe uncontrolled pain     Change dressing (specify)   Order Comments: Apply clean, dry dressing to wounds as needed to keep dry.       Medications:  Reconciled Home Medications:   Discharge Medication List as of 1/28/2018  9:09 AM      START taking these medications    Details   hydrocodone-acetaminophen 7.5-325mg (NORCO) 7.5-325 mg per tablet Take 1 tablet by mouth every 4  (four) hours as needed., Starting Sat 1/27/2018, Print      ondansetron (ZOFRAN) 4 MG tablet Take 1 tablet (4 mg total) by mouth every 6 (six) hours as needed for Nausea., Starting Sun 1/28/2018, Print         CONTINUE these medications which have NOT CHANGED    Details   HUMIRA PEN PnKt injection Starting Fri 12/22/2017, Historical Med         STOP taking these medications       tramadol (ULTRAM) 50 mg tablet Comments:   Reason for Stopping:               Reed Foster MD  General Surgery  [facility]

## 2018-02-15 NOTE — SUBJECTIVE & OBJECTIVE
Subjective:     Interval History: No acute events. Pain well-controlled. No N/V. Ambulating in hallway.     Post-Op Info:  Procedure(s) (LRB):  EXPLORATORY-LAPAROTOMY (N/A)  CATHETERIZATION-URETHRAL (Right)  LYSIS-ADHESION EXTENSIVE- LASTING MORE THAN 2 HRS  REPAIR-FISTULA-ENTEROCUTANEOUS WITH RESECTION  ENTERECTOMY  WRAP-OMENTAL   3 Days Post-Op      Medications:  Continuous Infusions:   hydromorphone in 0.9 % NaCl 6 mg/30 ml      TPN ADULT CENTRAL LINE CUSTOM 60 mL/hr at 01/20/18 2245     Scheduled Meds:   alteplase  2 mg Intra-Catheter Weekly    ciprofloxacin  400 mg Intravenous Q12H    cyanocobalamin  1,000 mcg Intramuscular Q30 Days    enoxaparin  40 mg Subcutaneous Daily    gabapentin  100 mg Oral TID    ibuprofen  400 mg Intravenous Q8H    metronidazole  500 mg Intravenous Q8H    pantoprazole  40 mg Intravenous Daily    sodium chloride 0.9%  10 mL Intravenous Q6H     PRN Meds:   alteplase    barium    barium    diphenhydrAMINE    naloxone    omnipaque    ondansetron    promethazine (PHENERGAN) IVPB    ramelteon    sodium chloride 0.9%        Objective:     Vital Signs (Most Recent):  Temp: 97.4 °F (36.3 °C) (01/21/18 1146)  Pulse: 96 (01/21/18 1146)  Resp: 20 (01/21/18 1146)  BP: 120/62 (01/21/18 1146)  SpO2: 98 % (01/21/18 1146) Vital Signs (24h Range):  Temp:  [97.2 °F (36.2 °C)-98.6 °F (37 °C)] 97.4 °F (36.3 °C)  Pulse:  [92-97] 96  Resp:  [15-20] 20  SpO2:  [96 %-98 %] 98 %  BP: (108-128)/(55-62) 120/62     Intake/Output - Last 3 Shifts       01/19 0700 - 01/20 0659 01/20 0700 - 01/21 0659 01/21 0700 - 01/22 0659    P.O. 620 100 0    I.V. (mL/kg)       IV Piggyback 400      TPN 1100      Total Intake(mL/kg) 2120 (34.7) 100 (1.6) 0 (0)    Urine (mL/kg/hr) 3125 (2.1) 1600 (1.1) 900 (2.6)    Emesis/NG output  0 (0) 0 (0)    Drains  40 (0) 5 (0)    Other  0 (0)     Stool  0 (0) 0 (0)    Blood  0 (0) 0 (0)    Total Output 5123 2665 903    Net -6606 -9467 -999           Urine Occurrence 1  Pt S/E at bedside, no acute events overnight, pain controlled    AVSS  Gen: NAD, AAOx3    Left Lower Extremity:  Dressing clean dry intact  +EHL/FHL/TA/GS  SILT L3-S1  +DP/PT Pulses  Compartments soft  No calf TTP B/L x      Stool Occurrence  0 x 0 x          Physical Exam   Constitutional: He is oriented to person, place, and time. He appears well-developed. No distress.   HENT:   Head: Normocephalic and atraumatic.   Eyes: EOM are normal.   Neck: Normal range of motion.   Cardiovascular: Normal rate and intact distal pulses.    Pulmonary/Chest: Effort normal. No respiratory distress.   Abdominal: Soft. He exhibits no distension. There is tenderness. There is no rebound and no guarding.   Midline incision c/d/i, penrose in place in RLQ tracking to R flank, SAMANTHA drain with serosang fluid        Musculoskeletal: Normal range of motion.   Neurological: He is alert and oriented to person, place, and time.   Skin: Skin is warm and dry.   Psychiatric: He has a normal mood and affect.     Significant Labs:  BMP (Last 3 Results):   Recent Labs  Lab 01/18/18  0619 01/19/18  0638 01/20/18  0321 01/21/18  0250   GLU 88 90 113* 111*   * 129* 134* 137   K 4.2 4.2 4.2 4.2    99 103 106   CO2 25 26 27 24   BUN 16 18 14 13   CREATININE 0.8 0.9 0.8 1.1   CALCIUM 8.8 7.9* 8.0* 8.3*   MG 1.9  --   --   --      CBC (Last 3 Results):   Recent Labs  Lab 01/20/18  0321 01/20/18  1718 01/21/18  0250   WBC 9.72 7.13 6.07   RBC 3.10* 3.01* 2.87*   HGB 7.1* 6.9* 6.7*   HCT 23.6* 22.7* 22.2*    224 212   MCV 76* 75* 77*   MCH 22.9* 22.9* 23.3*   MCHC 30.1* 30.4* 30.2*       Significant Diagnostics:  None

## 2018-05-03 ENCOUNTER — NURSE TRIAGE (OUTPATIENT)
Dept: ADMINISTRATIVE | Facility: CLINIC | Age: 43
End: 2018-05-03

## 2018-05-03 ENCOUNTER — TELEPHONE (OUTPATIENT)
Dept: SURGERY | Facility: CLINIC | Age: 43
End: 2018-05-03

## 2018-05-03 NOTE — TELEPHONE ENCOUNTER
----- Message from СВЕТЛАНА Galvan sent at 5/3/2018  4:12 PM CDT -----  Contact: pt [phone]  Cassie I did not callhim but let me know if cabrera should see him,  Old fistula may be reopening I suspect  ----- Message -----  From: Linda Luis  Sent: 5/3/2018   2:38 PM  To: СВЕТЛАНА Galvan    Pt states that he has a yellowish pus discharge coming from incision. Please call

## 2018-05-03 NOTE — TELEPHONE ENCOUNTER
Reason for Disposition   Pimple where a stitch comes through the skin    Protocols used: ST POST-OP INCISION SYMPTOMS-A-AH    Pt states there was a pimple with pus located by surgical site.  Pimple bust and drained.  Surgical site does not look infected per Pt.  No fever per Pt.  Care advice given.  Pt receiving call from [facility].

## 2018-05-03 NOTE — TELEPHONE ENCOUNTER
Reason for Disposition   Pimple where a stitch comes through the skin    Protocols used: ST POST-OP INCISION SYMPTOMS-A-AH    Pt returned call after speaking to [facility] office.  Pt went to local hospital for further evaluation.  Will message MD and Staff.

## 2018-05-04 NOTE — TELEPHONE ENCOUNTER
"----- Message from Celina Billingsley sent at 5/3/2018  5:37 PM CDT -----  Contact: [phone]  Vu Upton and Cassie...  The pt is calling.. States he is "freaking out" states his incision has a bump on it that has burst open and is filled with pus and blood. States he was healing beautifully before this... I also sent a message to the triage nurses who will call the pt tonight.   "

## 2018-05-15 ENCOUNTER — TELEPHONE (OUTPATIENT)
Dept: SURGERY | Facility: CLINIC | Age: 43
End: 2018-05-15

## 2018-05-15 NOTE — TELEPHONE ENCOUNTER
Called to see how pt is doing. Had 2 small areas on abdomen that opened. He went to the local ED and was told to keep it covered and it looked fine. The areas have closed. He is keeping a close eye on it. He will call if any new developments.

## 2023-03-20 NOTE — PLAN OF CARE
Patient leaving due to long wait times. States will follow up with PCP tomorrow   Sw following for d/c needs.     Ella Moody, VICKYSW [id number]

## 2023-11-03 ENCOUNTER — TELEPHONE (OUTPATIENT)
Dept: SURGERY | Facility: CLINIC | Age: 48
End: 2023-11-03
Payer: COMMERCIAL

## 2023-11-03 NOTE — TELEPHONE ENCOUNTER
----- Message from Cassie Dunn LPN sent at 11/2/2023  4:35 PM CDT -----  Contact: Pt    ----- Message -----  From: Flaca Lemus  Sent: 11/2/2023   1:36 PM CDT  To: Won [facility] Staff    Type:  Needs Medical Advice    Who Called: Pt  Symptoms (please be specific): Ruptured Fistula   How long has patient had these symptoms:  2 months    Would the patient rather a call back or a response via [facility]? call  Best Call Back Number: [phone]  Additional Information: Pt is calling and wanting a call in regards to surgery. Pt would like to discuss his condition with Dr. Upton. Please call pt back to advise.

## 2024-01-09 ENCOUNTER — TELEPHONE (OUTPATIENT)
Dept: SURGERY | Facility: CLINIC | Age: 49
End: 2024-01-09
Payer: COMMERCIAL

## 2024-01-09 NOTE — TELEPHONE ENCOUNTER
Spoke with patient regarding recommendation for provider in [place]. Patient states that Dr. Upton would have a recommendation for him. Explained that I will send Dr. Upton a message to reach out. Patient verbalizes understanding.

## 2024-01-09 NOTE — TELEPHONE ENCOUNTER
----- Message from Chema Dobbs sent at 1/9/2024 12:19 PM CST -----  Regarding: Pt advice  Contact: [phone]  Pt is calling to speak with the provider would like to discuss . Please call

## 2024-01-11 ENCOUNTER — TELEPHONE (OUTPATIENT)
Dept: SURGERY | Facility: CLINIC | Age: 49
End: 2024-01-11
Payer: COMMERCIAL

## 2024-01-11 NOTE — TELEPHONE ENCOUNTER
----- Message from OKSANA Upton MD sent at 1/10/2024  9:22 AM CST -----  Regarding: RE: Pt advice  Contact: [phone]  Adis Dye   Both at [facility] in [place]  ----- Message -----  From: Perla Sierra RN  Sent: 1/9/2024   5:18 PM CST  To: Cassie Dunn LPN; OKSANA Upton MD  Subject: FW: Pt advice                                    Hi - I spoke with him and he would like recommendations for providers in  [place]. Please advise.  ----- Message -----  From: Chema Dobbs  Sent: 1/9/2024  12:20 PM CST  To: Won Alexander Staff  Subject: Pt advice                                        Pt is calling to speak with the provider would like to discuss . Please call

## 2025-04-01 NOTE — TELEPHONE ENCOUNTER
----- Message from Cassie Dunn LPN sent at 2/9/2017  3:17 PM CST -----  Contact: pt#[phone]      ----- Message -----     From: Linda Luis     Sent: 2/9/2017   2:32 PM       To: Won Alexander Staff    Pt states that pharmacy do not have medication. Please call   oxycodone (ROXICODONE) 5 MG immediate release tablet   : c/w lopressor 25mg Q12  hold Eliquis for now pending official echo.

## (undated) DEVICE — SEE MEDLINE ITEM 152487

## (undated) DEVICE — STRIP PK IODOFORM CURITY 2X5YD

## (undated) DEVICE — SEE MEDLINE ITEM 146347

## (undated) DEVICE — DRAPE ABDOMINAL TIBURON 14X11

## (undated) DEVICE — TRAY FOLEY 16FR INFECTION CONT

## (undated) DEVICE — WIRE GUIDE 0.038OLD

## (undated) DEVICE — SEE MEDLINE ITEM 157144

## (undated) DEVICE — COVER LIGHT HANDLE 80/CA

## (undated) DEVICE — SEE MEDLINE ITEM 146417

## (undated) DEVICE — CUTTER PROXIMATE BLUE 75MM

## (undated) DEVICE — SEE MEDLINE ITEM 152622

## (undated) DEVICE — SEE MEDLINE ITEM 156902

## (undated) DEVICE — ADHESIVE DERMABOND ADVANCED

## (undated) DEVICE — POUCH SENSURA MIO 3/8X2 1/8IN

## (undated) DEVICE — SYR ONLY LUER LOCK 20CC

## (undated) DEVICE — DRESSING ADH ISLAND 3.6 X 14

## (undated) DEVICE — THERMOMETER RANG -4F +140F

## (undated) DEVICE — GUIDE WIRE

## (undated) DEVICE — NDL BOX COUNTER

## (undated) DEVICE — SEE MEDLINE ITEM 157117

## (undated) DEVICE — SUT MONOCRYL 4-0 PS-1 UND

## (undated) DEVICE — ELECTRODE REM PLYHSV RETURN 9

## (undated) DEVICE — Device

## (undated) DEVICE — TOWEL OR XRAY WHITE 17X26IN

## (undated) DEVICE — DRESSING TEGADERM 2X2 3/4

## (undated) DEVICE — GAUZE SPONGE 4X4 12PLY

## (undated) DEVICE — SPONGE LAP 18X18 PREWASHED

## (undated) DEVICE — KIT IRR SUCTION HND PIECE

## (undated) DEVICE — SPONGE IV DRAIN 4X4 STERILE

## (undated) DEVICE — EVACUATOR WOUND BULB 100CC

## (undated) DEVICE — NDL HYPO A BEVEL 22X1 1/2

## (undated) DEVICE — CATH POLLACK OPEN-END FLEXI-TI

## (undated) DEVICE — KIT EVACUATOR FULL PERF 100CC

## (undated) DEVICE — CLIPPER BLADE MOD 4406 (CAREF)

## (undated) DEVICE — NDL 22GA X1 1/2 REG BEVEL

## (undated) DEVICE — LUBRICANT SURGILUBE 2 OZ

## (undated) DEVICE — DRAIN CHANNEL ROUND 19FR

## (undated) DEVICE — RELOAD PROXIMATE CUT BLUE 75MM

## (undated) DEVICE — SEE MEDLINE ITEM 152741

## (undated) DEVICE — SEE MEDLINE ITEM 152742

## (undated) DEVICE — PAD ABD 8X10 STERILE

## (undated) DEVICE — TRAY CATH UM FOLEY SIL W 16FR